# Patient Record
Sex: MALE | Race: WHITE | NOT HISPANIC OR LATINO
[De-identification: names, ages, dates, MRNs, and addresses within clinical notes are randomized per-mention and may not be internally consistent; named-entity substitution may affect disease eponyms.]

---

## 2017-06-25 ENCOUNTER — RX RENEWAL (OUTPATIENT)
Age: 82
End: 2017-06-25

## 2017-07-09 ENCOUNTER — RX RENEWAL (OUTPATIENT)
Age: 82
End: 2017-07-09

## 2021-10-09 ENCOUNTER — INPATIENT (INPATIENT)
Facility: HOSPITAL | Age: 86
LOS: 2 days | Discharge: HOME | DRG: 813 | End: 2021-10-12
Attending: FAMILY MEDICINE | Admitting: FAMILY MEDICINE
Payer: MEDICARE

## 2021-10-09 VITALS — HEIGHT: 73.25 IN | WEIGHT: 199.96 LBS

## 2021-10-09 DIAGNOSIS — Z95.0 PRESENCE OF CARDIAC PACEMAKER: Chronic | ICD-10-CM

## 2021-10-09 DIAGNOSIS — K92.2 GASTROINTESTINAL HEMORRHAGE, UNSPECIFIED: ICD-10-CM

## 2021-10-09 DIAGNOSIS — Z98.61 CORONARY ANGIOPLASTY STATUS: Chronic | ICD-10-CM

## 2021-10-09 LAB
ALBUMIN SERPL ELPH-MCNC: 3.1 G/DL — LOW (ref 3.3–5)
ALP SERPL-CCNC: 92 U/L — SIGNIFICANT CHANGE UP (ref 40–120)
ALT FLD-CCNC: 67 U/L — SIGNIFICANT CHANGE UP (ref 12–78)
ANION GAP SERPL CALC-SCNC: 5 MMOL/L — SIGNIFICANT CHANGE UP (ref 5–17)
APTT BLD: 32 SEC — SIGNIFICANT CHANGE UP (ref 27.5–35.5)
APTT BLD: 32.7 SEC — SIGNIFICANT CHANGE UP (ref 27.5–35.5)
AST SERPL-CCNC: 42 U/L — HIGH (ref 15–37)
BASOPHILS # BLD AUTO: 0 K/UL — SIGNIFICANT CHANGE UP (ref 0–0.2)
BASOPHILS NFR BLD AUTO: 0 % — SIGNIFICANT CHANGE UP (ref 0–2)
BILIRUB SERPL-MCNC: 0.6 MG/DL — SIGNIFICANT CHANGE UP (ref 0.2–1.2)
BUN SERPL-MCNC: 20 MG/DL — SIGNIFICANT CHANGE UP (ref 7–23)
CALCIUM SERPL-MCNC: 8.1 MG/DL — LOW (ref 8.5–10.1)
CHLORIDE SERPL-SCNC: 104 MMOL/L — SIGNIFICANT CHANGE UP (ref 96–108)
CO2 SERPL-SCNC: 28 MMOL/L — SIGNIFICANT CHANGE UP (ref 22–31)
CREAT SERPL-MCNC: 0.85 MG/DL — SIGNIFICANT CHANGE UP (ref 0.5–1.3)
EOSINOPHIL # BLD AUTO: 0.07 K/UL — SIGNIFICANT CHANGE UP (ref 0–0.5)
EOSINOPHIL NFR BLD AUTO: 6 % — SIGNIFICANT CHANGE UP (ref 0–6)
GLUCOSE SERPL-MCNC: 101 MG/DL — HIGH (ref 70–99)
HCT VFR BLD CALC: 33.4 % — LOW (ref 39–50)
HCT VFR BLD CALC: 38.1 % — LOW (ref 39–50)
HGB BLD-MCNC: 10.3 G/DL — LOW (ref 13–17)
HGB BLD-MCNC: 9.4 G/DL — LOW (ref 13–17)
INR BLD: 1.04 RATIO — SIGNIFICANT CHANGE UP (ref 0.88–1.16)
INR BLD: 1.17 RATIO — HIGH (ref 0.88–1.16)
LYMPHOCYTES # BLD AUTO: 0.39 K/UL — LOW (ref 1–3.3)
LYMPHOCYTES # BLD AUTO: 36 % — SIGNIFICANT CHANGE UP (ref 13–44)
MAGNESIUM SERPL-MCNC: 2.1 MG/DL — SIGNIFICANT CHANGE UP (ref 1.6–2.6)
MCHC RBC-ENTMCNC: 17.2 PG — LOW (ref 27–34)
MCHC RBC-ENTMCNC: 17.5 PG — LOW (ref 27–34)
MCHC RBC-ENTMCNC: 27 GM/DL — LOW (ref 32–36)
MCHC RBC-ENTMCNC: 28.1 GM/DL — LOW (ref 32–36)
MCV RBC AUTO: 62.1 FL — LOW (ref 80–100)
MCV RBC AUTO: 63.7 FL — LOW (ref 80–100)
MONOCYTES # BLD AUTO: 0.09 K/UL — SIGNIFICANT CHANGE UP (ref 0–0.9)
MONOCYTES NFR BLD AUTO: 8 % — SIGNIFICANT CHANGE UP (ref 2–14)
NEUTROPHILS # BLD AUTO: 0.55 K/UL — LOW (ref 1.8–7.4)
NEUTROPHILS NFR BLD AUTO: 50 % — SIGNIFICANT CHANGE UP (ref 43–77)
NRBC # BLD: SIGNIFICANT CHANGE UP /100 WBCS (ref 0–0)
PLATELET # BLD AUTO: 39 K/UL — LOW (ref 150–400)
PLATELET # BLD AUTO: 49 K/UL — LOW (ref 150–400)
POTASSIUM SERPL-MCNC: 4.3 MMOL/L — SIGNIFICANT CHANGE UP (ref 3.5–5.3)
POTASSIUM SERPL-SCNC: 4.3 MMOL/L — SIGNIFICANT CHANGE UP (ref 3.5–5.3)
PROT SERPL-MCNC: 6.5 GM/DL — SIGNIFICANT CHANGE UP (ref 6–8.3)
PROTHROM AB SERPL-ACNC: 12 SEC — SIGNIFICANT CHANGE UP (ref 10.6–13.6)
PROTHROM AB SERPL-ACNC: 13.5 SEC — SIGNIFICANT CHANGE UP (ref 10.6–13.6)
RBC # BLD: 5.38 M/UL — SIGNIFICANT CHANGE UP (ref 4.2–5.8)
RBC # BLD: 5.98 M/UL — HIGH (ref 4.2–5.8)
RBC # FLD: 24.7 % — HIGH (ref 10.3–14.5)
RBC # FLD: 25.2 % — HIGH (ref 10.3–14.5)
SARS-COV-2 RNA SPEC QL NAA+PROBE: SIGNIFICANT CHANGE UP
SODIUM SERPL-SCNC: 137 MMOL/L — SIGNIFICANT CHANGE UP (ref 135–145)
TROPONIN I, HIGH SENSITIVITY RESULT: 16.8 NG/L — SIGNIFICANT CHANGE UP
WBC # BLD: 1.02 K/UL — CRITICAL LOW (ref 3.8–10.5)
WBC # BLD: 1.09 K/UL — CRITICAL LOW (ref 3.8–10.5)
WBC # FLD AUTO: 1.02 K/UL — CRITICAL LOW (ref 3.8–10.5)
WBC # FLD AUTO: 1.09 K/UL — CRITICAL LOW (ref 3.8–10.5)

## 2021-10-09 PROCEDURE — C9113: CPT

## 2021-10-09 PROCEDURE — 86769 SARS-COV-2 COVID-19 ANTIBODY: CPT

## 2021-10-09 PROCEDURE — 36415 COLL VENOUS BLD VENIPUNCTURE: CPT

## 2021-10-09 PROCEDURE — 99285 EMERGENCY DEPT VISIT HI MDM: CPT

## 2021-10-09 PROCEDURE — 85610 PROTHROMBIN TIME: CPT

## 2021-10-09 PROCEDURE — 85027 COMPLETE CBC AUTOMATED: CPT

## 2021-10-09 PROCEDURE — 36430 TRANSFUSION BLD/BLD COMPNT: CPT

## 2021-10-09 PROCEDURE — 99222 1ST HOSP IP/OBS MODERATE 55: CPT

## 2021-10-09 PROCEDURE — G1004: CPT

## 2021-10-09 PROCEDURE — 82272 OCCULT BLD FECES 1-3 TESTS: CPT

## 2021-10-09 PROCEDURE — P9100: CPT

## 2021-10-09 PROCEDURE — 85025 COMPLETE CBC W/AUTO DIFF WBC: CPT

## 2021-10-09 PROCEDURE — 70450 CT HEAD/BRAIN W/O DYE: CPT | Mod: 26,MG

## 2021-10-09 PROCEDURE — P9037: CPT

## 2021-10-09 PROCEDURE — 97116 GAIT TRAINING THERAPY: CPT | Mod: GP

## 2021-10-09 PROCEDURE — 93010 ELECTROCARDIOGRAM REPORT: CPT

## 2021-10-09 PROCEDURE — 83735 ASSAY OF MAGNESIUM: CPT

## 2021-10-09 PROCEDURE — 85730 THROMBOPLASTIN TIME PARTIAL: CPT

## 2021-10-09 PROCEDURE — 97161 PT EVAL LOW COMPLEX 20 MIN: CPT | Mod: GP

## 2021-10-09 PROCEDURE — 80048 BASIC METABOLIC PNL TOTAL CA: CPT

## 2021-10-09 PROCEDURE — 71045 X-RAY EXAM CHEST 1 VIEW: CPT | Mod: 26

## 2021-10-09 RX ORDER — POTASSIUM CHLORIDE 20 MEQ
20 PACKET (EA) ORAL DAILY
Refills: 0 | Status: DISCONTINUED | OUTPATIENT
Start: 2021-10-09 | End: 2021-10-12

## 2021-10-09 RX ORDER — LISINOPRIL 2.5 MG/1
5 TABLET ORAL DAILY
Refills: 0 | Status: DISCONTINUED | OUTPATIENT
Start: 2021-10-09 | End: 2021-10-10

## 2021-10-09 RX ORDER — LANOLIN ALCOHOL/MO/W.PET/CERES
3 CREAM (GRAM) TOPICAL AT BEDTIME
Refills: 0 | Status: DISCONTINUED | OUTPATIENT
Start: 2021-10-09 | End: 2021-10-12

## 2021-10-09 RX ORDER — PROTHROMBIN COMPLEX CONCENTRATE (HUMAN) 25.5; 16.5; 24; 22; 22; 26 [IU]/ML; [IU]/ML; [IU]/ML; [IU]/ML; [IU]/ML; [IU]/ML
3500 POWDER, FOR SOLUTION INTRAVENOUS ONCE
Refills: 0 | Status: DISCONTINUED | OUTPATIENT
Start: 2021-10-09 | End: 2021-10-09

## 2021-10-09 RX ORDER — AMIODARONE HYDROCHLORIDE 400 MG/1
1 TABLET ORAL
Qty: 0 | Refills: 0 | DISCHARGE

## 2021-10-09 RX ORDER — FUROSEMIDE 40 MG
1 TABLET ORAL
Qty: 0 | Refills: 0 | DISCHARGE

## 2021-10-09 RX ORDER — PROTHROMBIN COMPLEX CONCENTRATE (HUMAN) 25.5; 16.5; 24; 22; 22; 26 [IU]/ML; [IU]/ML; [IU]/ML; [IU]/ML; [IU]/ML; [IU]/ML
2500 POWDER, FOR SOLUTION INTRAVENOUS ONCE
Refills: 0 | Status: COMPLETED | OUTPATIENT
Start: 2021-10-09 | End: 2021-10-09

## 2021-10-09 RX ORDER — ONDANSETRON 8 MG/1
4 TABLET, FILM COATED ORAL EVERY 8 HOURS
Refills: 0 | Status: DISCONTINUED | OUTPATIENT
Start: 2021-10-09 | End: 2021-10-12

## 2021-10-09 RX ORDER — ACETAMINOPHEN 500 MG
650 TABLET ORAL EVERY 6 HOURS
Refills: 0 | Status: DISCONTINUED | OUTPATIENT
Start: 2021-10-09 | End: 2021-10-12

## 2021-10-09 RX ORDER — POTASSIUM CHLORIDE 20 MEQ
1 PACKET (EA) ORAL
Qty: 0 | Refills: 0 | DISCHARGE

## 2021-10-09 RX ORDER — APIXABAN 2.5 MG/1
0 TABLET, FILM COATED ORAL
Qty: 0 | Refills: 0 | DISCHARGE

## 2021-10-09 RX ORDER — ATORVASTATIN CALCIUM 80 MG/1
40 TABLET, FILM COATED ORAL AT BEDTIME
Refills: 0 | Status: DISCONTINUED | OUTPATIENT
Start: 2021-10-09 | End: 2021-10-12

## 2021-10-09 RX ORDER — AMIODARONE HYDROCHLORIDE 400 MG/1
200 TABLET ORAL
Refills: 0 | Status: DISCONTINUED | OUTPATIENT
Start: 2021-10-09 | End: 2021-10-12

## 2021-10-09 RX ORDER — PROTHROMBIN COMPLEX CONCENTRATE (HUMAN) 25.5; 16.5; 24; 22; 22; 26 [IU]/ML; [IU]/ML; [IU]/ML; [IU]/ML; [IU]/ML; [IU]/ML
500 POWDER, FOR SOLUTION INTRAVENOUS ONCE
Refills: 0 | Status: DISCONTINUED | OUTPATIENT
Start: 2021-10-09 | End: 2021-10-09

## 2021-10-09 RX ORDER — METOPROLOL TARTRATE 50 MG
50 TABLET ORAL DAILY
Refills: 0 | Status: DISCONTINUED | OUTPATIENT
Start: 2021-10-09 | End: 2021-10-10

## 2021-10-09 RX ORDER — FUROSEMIDE 40 MG
40 TABLET ORAL DAILY
Refills: 0 | Status: DISCONTINUED | OUTPATIENT
Start: 2021-10-09 | End: 2021-10-10

## 2021-10-09 RX ADMIN — ATORVASTATIN CALCIUM 40 MILLIGRAM(S): 80 TABLET, FILM COATED ORAL at 21:02

## 2021-10-09 RX ADMIN — PROTHROMBIN COMPLEX CONCENTRATE (HUMAN) 400 INTERNATIONAL UNIT(S): 25.5; 16.5; 24; 22; 22; 26 POWDER, FOR SOLUTION INTRAVENOUS at 14:02

## 2021-10-09 NOTE — ED STATDOCS - NSICDXPASTMEDICALHX_GEN_ALL_CORE_FT
PAST MEDICAL HISTORY:  Atrial fibrillation     CAD (coronary artery disease) stent in 2007    Hypertension     Pacemaker     Polycythemia vera     Prostate ca radiation treatment 5yrs ago    Rheumatic fever child traylor    Symptomatic bradycardia

## 2021-10-09 NOTE — PATIENT PROFILE ADULT - DATE OF LAST VACCINATION
Patient called stating that the medication prescribed is not giving any relief to his pain. Patient took the medication as was instructed.    oxyCODONE-acetaminophen (PERCOCET)  MG per tablet   18-Aug-2021

## 2021-10-09 NOTE — ED PROVIDER NOTE - OBJECTIVE STATEMENT
85 y/o male with a PMHx of polycythemia vera, symptomatic bradycardia, prostate CA, rheumatic fever, CAD s/p stent,  pacemaker, HTN, Afib presents to the ED c/o ecchymosis. Sent by MD. States he exercises and was in good shape but after first lung CA chemo 09/27, he felt increasingly weak on exertion. State she feels fine in ED. +dark stool x2 days. On Eliquis. No HA.

## 2021-10-09 NOTE — ED ADULT NURSE NOTE - NSIMPLEMENTINTERV_GEN_ALL_ED
Implemented All Fall Risk Interventions:  [place] to call system. Call bell, personal items and telephone within reach. Instruct patient to call for assistance. Room bathroom lighting operational. Non-slip footwear when patient is off stretcher. Physically safe environment: no spills, clutter or unnecessary equipment. Stretcher in lowest position, wheels locked, appropriate side rails in place. Provide visual cue, wrist band, yellow gown, etc. Monitor gait and stability. Monitor for mental status changes and reorient to person, place, and time. Review medications for side effects contributing to fall risk. Reinforce activity limits and safety measures with patient and family.

## 2021-10-09 NOTE — ED ADULT NURSE NOTE - OBJECTIVE STATEMENT
Patient sent to ED for evaluation with increased bruising and dark stool.   Patient also c/o weakness.  On chemotherapy.

## 2021-10-09 NOTE — ED STATDOCS - PROGRESS NOTE DETAILS
Liz BATEMAN for Dr. Devine: Started chemo 2 weeks ago, pt is pale with SALCIDO, multiple episodes of spontaneous bruising and black stools. Will send pt to main ED for further evaluation.

## 2021-10-09 NOTE — ED ADULT TRIAGE NOTE - CHIEF COMPLAINT QUOTE
PT SENT IN BY MD FOR BRUISING IN HAND, DARK TARRY STOOL, WEAKNESS, PT ON CHEMO, LAST CHEMO 2 WEEKS AGO, PT ALSO TAKING IRON.  HX OF LUNG CA.

## 2021-10-09 NOTE — H&P ADULT - NSICDXPASTSURGICALHX_GEN_ALL_CORE_FT
PAST SURGICAL HISTORY:  Artificial cardiac pacemaker 2006    History of PTCA with stent to RCA 2007

## 2021-10-09 NOTE — ED PROVIDER NOTE - CLINICAL SUMMARY MEDICAL DECISION MAKING FREE TEXT BOX
87 y/o male with a PMHx of polycythemia vera, symptomatic bradycardia, prostate CA, rheumatic fever, CAD s/p stent,  pacemaker, HTN, Afib presents to the ED c/o ecchymosis. Recent chemo with +dark stools. Concern for thrombocytopenia and anemia. Check labs, Guaiac and reassess.

## 2021-10-09 NOTE — H&P ADULT - ASSESSMENT
dark stools on eliquis with associated with generalized fatigue and weakness.  guiac deferred due to neutropenia, consistent with symptomatic anemia due to GIB on eliquis.  s/p kcentra and 1U plt in ED  -admit to med/surg   -onco consult   -gi consult   -monitor cbc   -hold aspirin and eliquis for now   -neutropenic precautions     pancytopenia, likely related to chemo (unsure of med received  -hem/onco consult     cad, s/p cabg   -cont bb, lasix with parameters     afib s/p PPM on eliquis   -cont metoprolol and amiodarone     dvt prophylaxis   -contraindicated due to likely GIB

## 2021-10-09 NOTE — ED PROVIDER NOTE - NS ED ROS FT
Gen: No fever, normal appetite.   Eyes: No eye irritation or discharge  ENT: No ear pain, congestion, sore throat  Resp: No cough or trouble breathing  Cardiovascular: No chest pain or palpitation  Gastroenteric: No nausea/vomiting, diarrhea, constipation. +dark stool.  :  No change in urine output; no dysuria  MS: No joint or muscle pain  Skin: No rashes. +ecchymosis on hand.  Neuro: No headache; no abnormal movements. +weakness.   Remainder negative, except as per the HPI

## 2021-10-09 NOTE — H&P ADULT - NSHPPHYSICALEXAM_GEN_ALL_CORE
PHYSICAL EXAM:    General: NAD, Alert & Oriented X3.  HEENT: NC/AT, Normal Hearing, dry mucous membranes  Eyes: EOMI, PERRLA, Conjunctiva and sclera clear  Neck: Soft and supple. No JVD  Respiratory: Clear breath sounds bilaterally.  No wheezing, rales or rhonchi  Cardiovascular: S1 and S2, irregular rhythm, normal rate .  No murmurs, gallops or rubs  Gastrointestinal: Soft, non-tender, non-distended. No guarding, rebound tenderness, +BS  Genitourinary: Voiding freely. No palpable bladder  Extremities: +2 peripheral pulses bilaterally.  No clubbing, cyanosis, or edema.    Neurological: CN II-XII intact.  No focal deficits  Musculoskeletal: 5/5 strength bilateral upper and lower extremities  Skin: No rashes, multiple ecchymosis

## 2021-10-09 NOTE — H&P ADULT - HISTORY OF PRESENT ILLNESS
87 y/o male with a PMHx of polycythemia vera, prostate CA, rheumatic fever, CAD s/p CABG,  Afib s/p PPM on eliquis, HTN, Afib presents to the ED c/o ecchymosis and dark stools for the last several days following first dose of chemo on 9/27 for lung cancer.  has been followed by Dr. Vazquez for several years but just received started chemo. sunce has fet generalized weakness fatigue and noticed dark stools, but gross blood.  prior to treatment is in relatively good shape, still excersies and practicing as a malpractice .  denies any other acute issues      In ED, noted to have mild anemia but neutropenic at wbc-1.09.  guiac held by to neurtropenic.  no BM since PTA.  afebrile.

## 2021-10-09 NOTE — ED STATDOCS - NSICDXFAMILYHX_GEN_ALL_CORE_FT
FAMILY HISTORY:  Mother  Still living? No  Family history of bronchiectasis, Age at diagnosis: Age Unknown

## 2021-10-09 NOTE — ED PROVIDER NOTE - PHYSICAL EXAMINATION
GEN - NAD; well appearing; A+O x3   HEAD - NC/AT     EYES - EOMI, no conjunctival pallor, no scleral icterus  ENT -   mucous membranes  moist , no discharge      NECK - Neck supple  PULM - CTA b/l,  symmetric breath sounds  COR -  RRR, S1 S2, no murmurs  ABD - , ND, NT, soft, no guarding, no rebound, no masses    BACK - no CVA tenderness, nontender spine     EXTREMS - no edema, no deformity, warm and well perfused    SKIN - no rash. +diffuse ecchymosis on hands   NEUROLOGIC - alert, sensation nl, motor 5/5 RUE/LUE/RLE/LLE

## 2021-10-10 LAB
ANION GAP SERPL CALC-SCNC: 4 MMOL/L — LOW (ref 5–17)
BUN SERPL-MCNC: 14 MG/DL — SIGNIFICANT CHANGE UP (ref 7–23)
CALCIUM SERPL-MCNC: 8.4 MG/DL — LOW (ref 8.5–10.1)
CHLORIDE SERPL-SCNC: 105 MMOL/L — SIGNIFICANT CHANGE UP (ref 96–108)
CO2 SERPL-SCNC: 29 MMOL/L — SIGNIFICANT CHANGE UP (ref 22–31)
COVID-19 SPIKE DOMAIN AB INTERP: POSITIVE
COVID-19 SPIKE DOMAIN ANTIBODY RESULT: >250 U/ML — HIGH
CREAT SERPL-MCNC: 0.84 MG/DL — SIGNIFICANT CHANGE UP (ref 0.5–1.3)
GLUCOSE SERPL-MCNC: 94 MG/DL — SIGNIFICANT CHANGE UP (ref 70–99)
HCT VFR BLD CALC: 35 % — LOW (ref 39–50)
HGB BLD-MCNC: 9.6 G/DL — LOW (ref 13–17)
MCHC RBC-ENTMCNC: 17.2 PG — LOW (ref 27–34)
MCHC RBC-ENTMCNC: 27.4 GM/DL — LOW (ref 32–36)
MCV RBC AUTO: 62.7 FL — LOW (ref 80–100)
NRBC # BLD: SIGNIFICANT CHANGE UP /100 WBCS (ref 0–0)
OB PNL STL: NEGATIVE — SIGNIFICANT CHANGE UP
PLATELET # BLD AUTO: 44 K/UL — LOW (ref 150–400)
POTASSIUM SERPL-MCNC: 4 MMOL/L — SIGNIFICANT CHANGE UP (ref 3.5–5.3)
POTASSIUM SERPL-SCNC: 4 MMOL/L — SIGNIFICANT CHANGE UP (ref 3.5–5.3)
RBC # BLD: 5.58 M/UL — SIGNIFICANT CHANGE UP (ref 4.2–5.8)
RBC # FLD: 24.8 % — HIGH (ref 10.3–14.5)
SARS-COV-2 IGG+IGM SERPL QL IA: >250 U/ML — HIGH
SARS-COV-2 IGG+IGM SERPL QL IA: POSITIVE
SODIUM SERPL-SCNC: 138 MMOL/L — SIGNIFICANT CHANGE UP (ref 135–145)
WBC # BLD: 0.8 K/UL — CRITICAL LOW (ref 3.8–10.5)
WBC # FLD AUTO: 0.8 K/UL — CRITICAL LOW (ref 3.8–10.5)

## 2021-10-10 PROCEDURE — 99233 SBSQ HOSP IP/OBS HIGH 50: CPT

## 2021-10-10 RX ORDER — PANTOPRAZOLE SODIUM 20 MG/1
40 TABLET, DELAYED RELEASE ORAL EVERY 12 HOURS
Refills: 0 | Status: DISCONTINUED | OUTPATIENT
Start: 2021-10-10 | End: 2021-10-12

## 2021-10-10 RX ORDER — SODIUM CHLORIDE 9 MG/ML
1000 INJECTION INTRAMUSCULAR; INTRAVENOUS; SUBCUTANEOUS
Refills: 0 | Status: DISCONTINUED | OUTPATIENT
Start: 2021-10-10 | End: 2021-10-12

## 2021-10-10 RX ORDER — METOPROLOL TARTRATE 50 MG
25 TABLET ORAL DAILY
Refills: 0 | Status: DISCONTINUED | OUTPATIENT
Start: 2021-10-10 | End: 2021-10-12

## 2021-10-10 RX ORDER — FUROSEMIDE 40 MG
20 TABLET ORAL DAILY
Refills: 0 | Status: DISCONTINUED | OUTPATIENT
Start: 2021-10-10 | End: 2021-10-12

## 2021-10-10 RX ORDER — FILGRASTIM 480MCG/1.6
480 VIAL (ML) INJECTION DAILY
Refills: 0 | Status: DISCONTINUED | OUTPATIENT
Start: 2021-10-10 | End: 2021-10-12

## 2021-10-10 RX ADMIN — ATORVASTATIN CALCIUM 40 MILLIGRAM(S): 80 TABLET, FILM COATED ORAL at 20:51

## 2021-10-10 RX ADMIN — SODIUM CHLORIDE 50 MILLILITER(S): 9 INJECTION INTRAMUSCULAR; INTRAVENOUS; SUBCUTANEOUS at 15:16

## 2021-10-10 RX ADMIN — PANTOPRAZOLE SODIUM 40 MILLIGRAM(S): 20 TABLET, DELAYED RELEASE ORAL at 15:16

## 2021-10-10 RX ADMIN — Medication 480 MICROGRAM(S): at 16:52

## 2021-10-10 RX ADMIN — Medication 20 MILLIEQUIVALENT(S): at 09:33

## 2021-10-10 RX ADMIN — AMIODARONE HYDROCHLORIDE 200 MILLIGRAM(S): 400 TABLET ORAL at 09:32

## 2021-10-10 RX ADMIN — PANTOPRAZOLE SODIUM 40 MILLIGRAM(S): 20 TABLET, DELAYED RELEASE ORAL at 20:51

## 2021-10-10 RX ADMIN — Medication 40 MILLIGRAM(S): at 09:33

## 2021-10-10 RX ADMIN — Medication 50 MILLIGRAM(S): at 09:33

## 2021-10-10 RX ADMIN — LISINOPRIL 5 MILLIGRAM(S): 2.5 TABLET ORAL at 09:33

## 2021-10-10 NOTE — PROGRESS NOTE ADULT - ASSESSMENT
dark stools on eliquis with associated with generalized fatigue and weakness.    guiac deferred due to neutropenia, consistent with symptomatic anemia due to GIB on eliquis.    s/p kcentra and 1U plt in ED  -admit to med/surg   -onco consult  and gi consult   -monitor cbc   -hold aspirin and eliquis for now   -neutropenic precautions     pancytopenia, likely related to chemo (unsure of med received  -hem/onco consult     cad, s/p cabg   -cont bb, lasix with parameters     afib s/p PPM on eliquis   -cont metoprolol and amiodarone     dvt prophylaxis   -contraindicated due to likely GIB    dark stools on eliquis with associated with generalized fatigue and weakness.    guiac deferred due to neutropenia, consistent with symptomatic anemia due to GIB on eliquis.    s/p kcentra and 1U plt in ED  -admit to med/surg   -onco consult  and gi consult   -neutropenic precautions   10/10 - monitor H&H, cont to hold asa and eliquis     pancytopenia, likely related to chemo (unsure of med received  -hem/onco consult   10/10 - appreciate Heme Onc help - start filgrastim     cad, s/p cabg   -cont bb, lasix with parameters   10/10 - due to low BPs - I made the following changes to his home meds:  Halved metopolol to 25, lasix to 20; stopped the ACEI     afib s/p PPM on eliquis   -cont metoprolol and amiodarone   10/10 - see above changes     dvt prophylaxis   -contraindicated due to likely GIB     discussed with GI AND DAVE and wife at bedside   pt sees Dr AGOSTO at [facility]

## 2021-10-10 NOTE — PROGRESS NOTE ADULT - SUBJECTIVE AND OBJECTIVE BOX
Patient is a 86y old  Male who presents with a chief complaint of     HPI:  85 y/o male with a PMHx of polycythemia vera, prostate CA, rheumatic fever, CAD s/p CABG,  Afib s/p PPM on eliquis, HTN, Stage IV lung Ca s/p chemo on 9/27 at [facility] presents to the ED c/o ecchymosis and dark stools for the last several days following first dose of chemo on 9/27 for lung cancer.  has been followed by Dr. Vazquez for several years but just received started chemo. he has fet generalized weakness fatigue and noticed dark stools, but no gross blood.  prior to treatment is in relatively good shape, still excersies and practicing as a malpractice .  denies any other acute issues.  last colonoscopy 5 yrs ago          REVIEW OF SYSTEMS:    CONSTITUTIONAL: No weakness, fevers or chills  EYES/ENT: No visual changes;  No vertigo or throat pain   NECK: No pain or stiffness  RESPIRATORY: No cough, wheezing, hemoptysis; No shortness of breath  CARDIOVASCULAR: No chest pain or palpitations  GASTROINTESTINAL: No abdominal or epigastric pain. No nausea, vomiting, or hematemesis; No diarrhea or constipation. No melena or hematochezia.  GENITOURINARY: No dysuria, frequency or hematuria  NEUROLOGICAL: No numbness or weakness  SKIN: No itching, burning, rashes, or lesions   All other review of systems is negative unless indicated above.    PAST MEDICAL & SURGICAL HISTORY:  Atrial fibrillation    Hypertension    CAD (coronary artery disease)  stent in 2007    Pacemaker    Rheumatic fever  child traylor    Prostate ca  radiation treatment 5yrs ago    Symptomatic bradycardia    Polycythemia vera    Artificial cardiac pacemaker  2006    History of PTCA  with stent to RCA 2007        SOCIAL HISTORY:    FAMILY HISTORY:  Family history of bronchiectasis (Mother)        MEDICATIONS  (STANDING):  aMIOdarone    Tablet 200 milliGRAM(s) Oral <User Schedule>  atorvastatin 40 milliGRAM(s) Oral at bedtime  furosemide    Tablet 20 milliGRAM(s) Oral daily  metoprolol succinate ER 25 milliGRAM(s) Oral daily  pantoprazole  Injectable 40 milliGRAM(s) IV Push every 12 hours  potassium chloride   Powder 20 milliEquivalent(s) Oral daily  sodium chloride 0.9%. 1000 milliLiter(s) (50 mL/Hr) IV Continuous <Continuous>    MEDICATIONS  (PRN):  acetaminophen   Tablet .. 650 milliGRAM(s) Oral every 6 hours PRN Temp greater or equal to 38C (100.4F), Mild Pain (1 - 3)  aluminum hydroxide/magnesium hydroxide/simethicone Suspension 30 milliLiter(s) Oral every 4 hours PRN Dyspepsia  melatonin 3 milliGRAM(s) Oral at bedtime PRN Insomnia  ondansetron Injectable 4 milliGRAM(s) IV Push every 8 hours PRN Nausea and/or Vomiting      Allergies    No Known Allergies    Intolerances        Vital Signs Last 24 Hrs  T(C): 36.6 (10 Oct 2021 15:03), Max: 36.8 (09 Oct 2021 15:59)  T(F): 97.9 (10 Oct 2021 15:03), Max: 98.3 (09 Oct 2021 15:59)  HR: 71 (10 Oct 2021 15:03) (70 - 78)  BP: 84/52 (10 Oct 2021 15:03) (83/54 - 135/74)  BP(mean): 66 (09 Oct 2021 15:59) (66 - 66)  RR: 18 (10 Oct 2021 15:03) (16 - 18)  SpO2: 100% (10 Oct 2021 15:03) (98% - 100%)    PHYSICAL EXAM  General: adult in NAD  HEENT: clear oropharynx, anicteric sclera, pink conjunctiva  Neck: supple  CV: normal S1/S2 with no murmur rubs or gallops  Lungs: positive air movement b/l ant lungs,clear to auscultation, no wheezes, no rales  Abdomen: soft non-tender non-distended, no hepatosplenomegaly  Ext: no clubbing cyanosis or edema  Skin: no rashes and no petechiae  Neuro: alert and oriented X 4, no focal deficits      LABS:                          9.6    0.80  )-----------( 44       ( 10 Oct 2021 07:00 )             35.0         Mean Cell Volume : 62.7 fl  Mean Cell Hemoglobin : 17.2 pg  Mean Cell Hemoglobin Concentration : 27.4 gm/dL  Auto Neutrophil # : 0.22 K/uL  Auto Lymphocyte # : 0.35 K/uL  Auto Monocyte # : 0.06 K/uL  Auto Eosinophil # : 0.07 K/uL  Auto Basophil # : 0.03 K/uL  Auto Neutrophil % : 29.6 %  Auto Lymphocyte % : 47.3 %  Auto Monocyte % : 8.1 %  Auto Eosinophil % : 9.5 %  Auto Basophil % : 4.1 %      Serial CBC's  10-10 @ 07:00  Hct-35.0 / Hgb-9.6 / Plat-44 / RBC-5.58 / WBC-0.80  Serial CBC's  10-09 @ 22:25  Hct-33.4 / Hgb-9.4 / Plat-49 / RBC-5.38 / WBC-1.02  Serial CBC's  10-09 @ 11:17  Hct-38.1 / Hgb-10.3 / Plat-39 / RBC-5.98 / WBC-1.09      10-10    138  |  105  |  14  ----------------------------<  94  4.0   |  29  |  0.84    Ca    8.4<L>      10 Oct 2021 07:00  Mg     2.1     10-09    TPro  6.5  /  Alb  3.1<L>  /  TBili  0.6  /  DBili  x   /  AST  42<H>  /  ALT  67  /  AlkPhos  92  10-09      PT/INR - ( 09 Oct 2021 14:48 )   PT: 12.0 sec;   INR: 1.04 ratio         PTT - ( 09 Oct 2021 14:48 )  PTT:32.0 sec                BLOOD SMEAR INTERPRETATION:       RADIOLOGY & ADDITIONAL STUDIES:

## 2021-10-10 NOTE — PROVIDER CONTACT NOTE (OTHER) - SITUATION
spoke with Pretty in answering service regarding consult spoke with answering service regarding consult spoke with Marques in answering service regarding consult

## 2021-10-10 NOTE — PROVIDER CONTACT NOTE (CRITICAL VALUE NOTIFICATION) - ACTION/TREATMENT ORDERED:
MD [place]
MD made aware. No new orders at this time. Pt remained on neutropenic precautions, VS stable Q4h, afebrile. Will continue to monitor pt.
See MD orders

## 2021-10-10 NOTE — PROGRESS NOTE ADULT - SUBJECTIVE AND OBJECTIVE BOX
CHIEF COMPLAINT:85 y/o male with a PMHx of polycythemia vera, prostate CA, rheumatic fever, CAD s/p CABG,  Afib s/p PPM on eliquis, HTN, Afib presents to the ED c/o ecchymosis and dark stools for the last several days following first dose of chemo on 9/27 for lung cancer.  has been followed by Dr. Vazquez for several years but just received started chemo. sunce has fet generalized weakness fatigue and noticed dark stools, but gross blood.  prior to treatment is in relatively good shape, still excersies and practicing as a malpractice .  denies any other acute issues      In ED, noted to have mild anemia but neutropenic at wbc-1.09.  guiac held by to neurtropenic.  no BM since PTA.  afebrile.      SUBJECTIVE:           Vital Signs Last 24 Hrs  T(F): 97.7 (10 Oct 2021 09:06), Max: 98.3 (09 Oct 2021 15:59)  HR: 73 (10 Oct 2021 09:06) (70 - 79)  BP: 125/69 (10 Oct 2021 09:06) (99/51 - 141/61)  RR: 16 (10 Oct 2021 09:06) (16 - 22)  SpO2: 100% (10 Oct 2021 09:06) (97% - 100%)  Constitutional: NAD, awake and alert  HEENT: PERR, EOMI  Neck: Soft and supple,  No JVD  Respiratory: Breath sounds are clear bilaterally, No wheezing, rales or rhonchi  Cardiovascular: S1 and S2, regular rate and rhythm, no Murmurs  Gastrointestinal: Bowel Sounds present, soft, nontender, nondistended  Extremities: No peripheral edema  Vascular: 2+ peripheral pulses  Neurological: A/O x 3, no focal deficits  Musculoskeletal: 5/5 strength b/l upper and lower extremities  Skin: No rashes    MEDICATIONS:  MEDICATIONS  (STANDING):  aMIOdarone    Tablet 200 milliGRAM(s) Oral <User Schedule>  atorvastatin 40 milliGRAM(s) Oral at bedtime  furosemide    Tablet 40 milliGRAM(s) Oral daily  lisinopril 5 milliGRAM(s) Oral daily  metoprolol tartrate Oral Tab/Cap - Peds 50 milliGRAM(s) Oral daily  potassium chloride   Powder 20 milliEquivalent(s) Oral daily      LABS: All Labs Reviewed:                      9.6    0.80  )-----------( 44       ( 10 Oct 2021 07:00 )             35.0  138  |  105  |  14  ----------------------------<  94  4.0   |  29  |  0.84  Ca    8.4<L>      10 Oct 2021 07:00  Mg     2.1     10-09  TPro  6.5  /  Alb  3.1<L>  /  TBili  0.6  /  DBili  x   /  AST  42<H>  /  ALT  67  /  AlkPhos  92  10-09  PT/INR - ( 09 Oct 2021 14:48 )   PT: 12.0 sec;   INR: 1.04 ratio    PTT - ( 09 Oct 2021 14:48 )  PTT:32.0 sec    RADIOLOGY/EKG:   CHIEF COMPLAINT:87 y/o male with a PMHx of polycythemia vera, prostate CA, rheumatic fever, CAD s/p CABG,  Afib s/p PPM on eliquis, HTN, Afib presents to the ED c/o ecchymosis and dark stools for the last several days following first dose of chemo on 9/27 for lung cancer.  has been followed by Dr. Vazquez for several years but just received started chemo. sunce has fet generalized weakness fatigue and noticed dark stools, but gross blood.  prior to treatment is in relatively good shape, still excersies and practicing as a malpractice .  denies any other acute issues      In ED, noted to have mild anemia but neutropenic at wbc-1.09.  guiac held by to neurtropenic.  no BM since PTA.  afebrile.      10/10 - no cp palps sob lightheadedness despite low BP; sitting up in chair, pleasant, conversant     Vital Signs Last 24 Hrs  T(F): 97.7 (10 Oct 2021 09:06), Max: 98.3 (09 Oct 2021 15:59)  HR: 73 (10 Oct 2021 09:06) (70 - 79)  BP: 125/69 (10 Oct 2021 09:06) (99/51 - 141/61)  RR: 16 (10 Oct 2021 09:06) (16 - 22)  SpO2: 100% (10 Oct 2021 09:06) (97% - 100%)  Constitutional: NAD, awake and alert  HEENT: PERR, EOMI  Neck: Soft and supple,  No JVD  Respiratory: Breath sounds are clear bilaterally, No wheezing, rales or rhonchi  Cardiovascular: S1 and S2, regular rate and rhythm, no Murmurs  Gastrointestinal: Bowel Sounds present, soft, nontender, nondistended  Extremities: No peripheral edema  Vascular: 2+ peripheral pulses  Neurological: A/O x 3, no focal deficits  Musculoskeletal: 5/5 strength b/l upper and lower extremities  Skin: No rashes    MEDICATIONS:  MEDICATIONS  (STANDING):  aMIOdarone    Tablet 200 milliGRAM(s) Oral <User Schedule>  atorvastatin 40 milliGRAM(s) Oral at bedtime  furosemide    Tablet 40 milliGRAM(s) Oral daily  lisinopril 5 milliGRAM(s) Oral daily  metoprolol tartrate Oral Tab/Cap - Peds 50 milliGRAM(s) Oral daily  potassium chloride   Powder 20 milliEquivalent(s) Oral daily      LABS: All Labs Reviewed:                      9.6    0.80  )-----------( 44       ( 10 Oct 2021 07:00 )             35.0  138  |  105  |  14  ----------------------------<  94  4.0   |  29  |  0.84  Ca    8.4<L>      10 Oct 2021 07:00  Mg     2.1     10-09  TPro  6.5  /  Alb  3.1<L>  /  TBili  0.6  /  DBili  x   /  AST  42<H>  /  ALT  67  /  AlkPhos  92  10-09  PT/INR - ( 09 Oct 2021 14:48 )   PT: 12.0 sec;   INR: 1.04 ratio    PTT - ( 09 Oct 2021 14:48 )  PTT:32.0 sec    RADIOLOGY/EKG:

## 2021-10-10 NOTE — PROGRESS NOTE ADULT - ASSESSMENT
87 y/o male with a PMHx of polycythemia vera, prostate CA, rheumatic fever, CAD s/p CABG,  Afib s/p PPM on eliquis, HTN, Stage IV lung Ca s/p chemo on 9/27 at [facility] presents to the ED c/o ecchymosis and dark stools for the last several days    Stage IV Lung Ca   - follows at [facility] ; last chemo 9/27 without Neulasta     GI bleed on Eliquis   - s/p kcentra and one unit plt in ER   - no further reports of gi bleed or dark stools  - GI eval pending    Pancytopenia sec to chemo   - will start filgrastrim   - transfuse for hb <7, plts <20    Connor Lawrence MD  [facility]  [phone]

## 2021-10-11 LAB
ANION GAP SERPL CALC-SCNC: 8 MMOL/L — SIGNIFICANT CHANGE UP (ref 5–17)
BASOPHILS # BLD AUTO: 0 K/UL — SIGNIFICANT CHANGE UP (ref 0–0.2)
BASOPHILS NFR BLD AUTO: 0 % — SIGNIFICANT CHANGE UP (ref 0–2)
BUN SERPL-MCNC: 11 MG/DL — SIGNIFICANT CHANGE UP (ref 7–23)
CALCIUM SERPL-MCNC: 8.2 MG/DL — LOW (ref 8.5–10.1)
CHLORIDE SERPL-SCNC: 105 MMOL/L — SIGNIFICANT CHANGE UP (ref 96–108)
CO2 SERPL-SCNC: 25 MMOL/L — SIGNIFICANT CHANGE UP (ref 22–31)
CREAT SERPL-MCNC: 0.91 MG/DL — SIGNIFICANT CHANGE UP (ref 0.5–1.3)
EOSINOPHIL # BLD AUTO: 0.17 K/UL — SIGNIFICANT CHANGE UP (ref 0–0.5)
EOSINOPHIL NFR BLD AUTO: 8 % — HIGH (ref 0–6)
GLUCOSE SERPL-MCNC: 108 MG/DL — HIGH (ref 70–99)
HCT VFR BLD CALC: 33.2 % — LOW (ref 39–50)
HGB BLD-MCNC: 9.2 G/DL — LOW (ref 13–17)
LYMPHOCYTES # BLD AUTO: 0.09 K/UL — LOW (ref 1–3.3)
LYMPHOCYTES # BLD AUTO: 4 % — LOW (ref 13–44)
MAGNESIUM SERPL-MCNC: 2 MG/DL — SIGNIFICANT CHANGE UP (ref 1.6–2.6)
MCHC RBC-ENTMCNC: 17.6 PG — LOW (ref 27–34)
MCHC RBC-ENTMCNC: 27.7 GM/DL — LOW (ref 32–36)
MCV RBC AUTO: 63.5 FL — LOW (ref 80–100)
MONOCYTES # BLD AUTO: 0.3 K/UL — SIGNIFICANT CHANGE UP (ref 0–0.9)
MONOCYTES NFR BLD AUTO: 14 % — SIGNIFICANT CHANGE UP (ref 2–14)
NEUTROPHILS # BLD AUTO: 1.59 K/UL — LOW (ref 1.8–7.4)
NEUTROPHILS NFR BLD AUTO: 74 % — SIGNIFICANT CHANGE UP (ref 43–77)
NRBC # BLD: SIGNIFICANT CHANGE UP /100 WBCS (ref 0–0)
PLATELET # BLD AUTO: 34 K/UL — LOW (ref 150–400)
POTASSIUM SERPL-MCNC: 3.8 MMOL/L — SIGNIFICANT CHANGE UP (ref 3.5–5.3)
POTASSIUM SERPL-SCNC: 3.8 MMOL/L — SIGNIFICANT CHANGE UP (ref 3.5–5.3)
RBC # BLD: 5.23 M/UL — SIGNIFICANT CHANGE UP (ref 4.2–5.8)
RBC # FLD: 24.6 % — HIGH (ref 10.3–14.5)
SODIUM SERPL-SCNC: 138 MMOL/L — SIGNIFICANT CHANGE UP (ref 135–145)
WBC # BLD: 2.15 K/UL — LOW (ref 3.8–10.5)
WBC # FLD AUTO: 2.15 K/UL — LOW (ref 3.8–10.5)

## 2021-10-11 PROCEDURE — 99223 1ST HOSP IP/OBS HIGH 75: CPT

## 2021-10-11 PROCEDURE — 99232 SBSQ HOSP IP/OBS MODERATE 35: CPT

## 2021-10-11 RX ORDER — SODIUM CHLORIDE 9 MG/ML
250 INJECTION INTRAMUSCULAR; INTRAVENOUS; SUBCUTANEOUS ONCE
Refills: 0 | Status: COMPLETED | OUTPATIENT
Start: 2021-10-11 | End: 2021-10-11

## 2021-10-11 RX ADMIN — ATORVASTATIN CALCIUM 40 MILLIGRAM(S): 80 TABLET, FILM COATED ORAL at 21:51

## 2021-10-11 RX ADMIN — PANTOPRAZOLE SODIUM 40 MILLIGRAM(S): 20 TABLET, DELAYED RELEASE ORAL at 11:15

## 2021-10-11 RX ADMIN — Medication 480 MICROGRAM(S): at 11:46

## 2021-10-11 RX ADMIN — PANTOPRAZOLE SODIUM 40 MILLIGRAM(S): 20 TABLET, DELAYED RELEASE ORAL at 21:51

## 2021-10-11 RX ADMIN — SODIUM CHLORIDE 500 MILLILITER(S): 9 INJECTION INTRAMUSCULAR; INTRAVENOUS; SUBCUTANEOUS at 05:26

## 2021-10-11 NOTE — PROGRESS NOTE ADULT - SUBJECTIVE AND OBJECTIVE BOX
CHIEF COMPLAINT:87 y/o male with a PMHx of polycythemia vera, prostate CA, rheumatic fever, CAD s/p CABG,  Afib s/p PPM on eliquis, HTN, Afib presents to the ED c/o ecchymosis and dark stools for the last several days following first dose of chemo on 9/27 for lung cancer.  has been followed by Dr. Vazquez for several years but just received started chemo. sunce has fet generalized weakness fatigue and noticed dark stools, but gross blood.  prior to treatment is in relatively good shape, still excersies and practicing as a malpractice .  denies any other acute issues      In ED, noted to have mild anemia but neutropenic at wbc-1.09.  guiac held by to neurtropenic.  no BM since PTA.  afebrile.      10/10 - no cp palps sob lightheadedness despite low BP; sitting up in chair, pleasant, conversant     Vital Signs Last 24 Hrs  T(F): 98.3 (11 Oct 2021 14:07), Max: 98.4 (10 Oct 2021 21:00)  HR: 71 (11 Oct 2021 14:07) (69 - 71)  BP: 91/55 (11 Oct 2021 14:07) (85/43 - 114/51)  RR: 16 (11 Oct 2021 14:07) (16 - 18)  SpO2: 99% (11 Oct 2021 14:07) (99% - 100%)  Constitutional: NAD, awake and alert  HEENT: PERR, EOMI  Neck: Soft and supple,  No JVD  Respiratory: Breath sounds are clear bilaterally, No wheezing, rales or rhonchi  Cardiovascular: S1 and S2, regular rate and rhythm, no Murmurs  Gastrointestinal: Bowel Sounds present, soft, nontender, nondistended  Extremities: No peripheral edema  Vascular: 2+ peripheral pulses  Neurological: A/O x 3, no focal deficits  Musculoskeletal: 5/5 strength b/l upper and lower extremities  Skin: No rashes    MEDICATIONS:  MEDICATIONS  (STANDING):  aMIOdarone    Tablet 200 milliGRAM(s) Oral <User Schedule>  atorvastatin 40 milliGRAM(s) Oral at bedtime  furosemide    Tablet 40 milliGRAM(s) Oral daily  lisinopril 5 milliGRAM(s) Oral daily  metoprolol tartrate Oral Tab/Cap - Peds 50 milliGRAM(s) Oral daily  potassium chloride   Powder 20 milliEquivalent(s) Oral daily      LABS: All Labs Reviewed:                      9.6    0.80  )-----------( 44       ( 10 Oct 2021 07:00 )             35.0  138  |  105  |  14  ----------------------------<  94  4.0   |  29  |  0.84  Ca    8.4<L>      10 Oct 2021 07:00  Mg     2.1     10-09  TPro  6.5  /  Alb  3.1<L>  /  TBili  0.6  /  DBili  x   /  AST  42<H>  /  ALT  67  /  AlkPhos  92  10-09  PT/INR - ( 09 Oct 2021 14:48 )   PT: 12.0 sec;   INR: 1.04 ratio    PTT - ( 09 Oct 2021 14:48 )  PTT:32.0 sec    RADIOLOGY/EKG:  < from: CT Head No Cont (10.09.21 @ 13:51) >  IMPRESSION:  Mild chronic microvascular changes without evidence of an acute transcortical infarction or hemorrhage.       CHIEF COMPLAINT:85 y/o male with a PMHx of polycythemia vera, prostate CA, rheumatic fever, CAD s/p CABG,  Afib s/p PPM on eliquis, HTN, Afib presents to the ED c/o ecchymosis and dark stools for the last several days following first dose of chemo on 9/27 for lung cancer.  has been followed by Dr. Vazquez for several years but just received started chemo. sunce has fet generalized weakness fatigue and noticed dark stools, but gross blood.  prior to treatment is in relatively good shape, still excersies and practicing as a malpractice .  denies any other acute issues      In ED, noted to have mild anemia but neutropenic at wbc-1.09.  guiac held by to neurtropenic.  no BM since PTA.  afebrile.      10/10 - no cp palps sob lightheadedness despite low BP; sitting up in chair, pleasant, conversant   10/11 - little tired, no cp palps, liberalized diet and allowing patient to drink water as tolerated as BPs low.    Vital Signs Last 24 Hrs  T(F): 98.3 (11 Oct 2021 14:07), Max: 98.4 (10 Oct 2021 21:00)  HR: 71 (11 Oct 2021 14:07) (69 - 71)  BP: 91/55 (11 Oct 2021 14:07) (85/43 - 114/51)  RR: 16 (11 Oct 2021 14:07) (16 - 18)  SpO2: 99% (11 Oct 2021 14:07) (99% - 100%)  Constitutional: NAD, awake and alert  HEENT: PERR, EOMI  Neck: Soft and supple,  No JVD  Respiratory: Breath sounds are clear bilaterally, No wheezing, rales or rhonchi  Cardiovascular: S1 and S2, regular rate and rhythm, no Murmurs  Gastrointestinal: Bowel Sounds present, soft, nontender, nondistended  Extremities: No peripheral edema  Vascular: 2+ peripheral pulses  Neurological: A/O x 3, no focal deficits  Musculoskeletal: 5/5 strength b/l upper and lower extremities  Skin: No rashes    RADIOLOGY/EKG:  < from: CT Head No Cont (10.09.21 @ 13:51) >  IMPRESSION:  Mild chronic microvascular changes without evidence of an acute transcortical infarction or hemorrhage.    LABS: All Labs Reviewed:                      9.6    0.80  )-----------( 44       ( 10 Oct 2021 07:00 )             35.0    labs reviewed from [facility]:  9/2021 -  Hb 13.3, WBC 9., platelets 447  10/11/21 - ANC 1.59, WBC 2.15, Hb 9.2, platelets 34    acetaminophen   Tablet .. 650 milliGRAM(s) Oral every 6 hours PRN  aluminum hydroxide/magnesium hydroxide/simethicone Suspension 30 milliLiter(s) Oral every 4 hours PRN  aMIOdarone    Tablet 200 milliGRAM(s) Oral <User Schedule>  atorvastatin 40 milliGRAM(s) Oral at bedtime  filgrastim-sndz (ZARXIO) Injectable 480 MICROGram(s) SubCutaneous daily  furosemide    Tablet 20 milliGRAM(s) Oral daily  melatonin 3 milliGRAM(s) Oral at bedtime PRN  metoprolol succinate ER 25 milliGRAM(s) Oral daily  ondansetron Injectable 4 milliGRAM(s) IV Push every 8 hours PRN  pantoprazole  Injectable 40 milliGRAM(s) IV Push every 12 hours  potassium chloride   Powder 20 milliEquivalent(s) Oral daily

## 2021-10-11 NOTE — CONSULT NOTE ADULT - ASSESSMENT
86 year old man with PCV, prostate cancer, CAD s/p CAGB, afib on eliquis, HTN, lung cancer recently started on chemo, admitted with bruising and dark stools for the past few days.     Likely having slow GI bleeding due to pancytopenia and anticoagulation. He is stable and on the floor.   I think the best option is to medically manage him with BID PPI for now and holding A/C, and reversing any coagulopathy considering these are likely the causes of slow oozing and the fact that he is profoundly neutropenic.     We will follow along closely and if risk/benefit changes then we would plan for endoscopy but now will hold off.     I am ok with giving him clears and advancing his diet as tolerated.     Ensure good IV access, two large bore IV's.

## 2021-10-11 NOTE — CONSULT NOTE ADULT - SUBJECTIVE AND OBJECTIVE BOX
full note to follow. no plans for endoscopy Patient is a 86y old  Male who presents with a chief complaint of anemia (11 Oct 2021 07:20)    86 year old man with PCV, prostate cancer, CAD s/p CAGB, afib on eliquis, HTN, lung cancer recently started on chemo, admitted with bruising and dark stools for the past few days.     Patient states that typically his stools are brown and formed. For that past few days his stools are much darker than usual. He denies any melena or jordan hematochezia, no hematemesis. He endorses feeling more progressively weak over the past few days; usually he is spry and walks/can exercise. No abdominal pain. Able to tolerate meals.  No travel history or sick contacts.     Denies any type of endoscopy and did have colonoscopy a few years back but doesn't remember who, was told normal.       PAST MEDICAL & SURGICAL HISTORY:  Atrial fibrillation    Hypertension    CAD (coronary artery disease)  stent in 2007    Pacemaker    Rheumatic fever  child traylor    Prostate ca  radiation treatment 5yrs ago    Symptomatic bradycardia    Polycythemia vera    Artificial cardiac pacemaker  2006    History of PTCA  with stent to RCA 2007        MEDICATIONS  (STANDING):  aMIOdarone    Tablet 200 milliGRAM(s) Oral <User Schedule>  atorvastatin 40 milliGRAM(s) Oral at bedtime  filgrastim-sndz (ZARXIO) Injectable 480 MICROGram(s) SubCutaneous daily  furosemide    Tablet 20 milliGRAM(s) Oral daily  metoprolol succinate ER 25 milliGRAM(s) Oral daily  pantoprazole  Injectable 40 milliGRAM(s) IV Push every 12 hours  potassium chloride   Powder 20 milliEquivalent(s) Oral daily  sodium chloride 0.9%. 1000 milliLiter(s) (50 mL/Hr) IV Continuous <Continuous>    MEDICATIONS  (PRN):  acetaminophen   Tablet .. 650 milliGRAM(s) Oral every 6 hours PRN Temp greater or equal to 38C (100.4F), Mild Pain (1 - 3)  aluminum hydroxide/magnesium hydroxide/simethicone Suspension 30 milliLiter(s) Oral every 4 hours PRN Dyspepsia  melatonin 3 milliGRAM(s) Oral at bedtime PRN Insomnia  ondansetron Injectable 4 milliGRAM(s) IV Push every 8 hours PRN Nausea and/or Vomiting      Allergies    No Known Allergies    Intolerances        SOCIAL HISTORY: no current smoking, drinking or drugs    FAMILY HISTORY:  Family history of bronchiectasis (Mother)        REVIEW OF SYSTEMS:    CONSTITUTIONAL: + weakness, fevers or chills  EYES/ENT: No visual changes;  No vertigo or throat pain   NECK: No pain or stiffness  RESPIRATORY: No cough, wheezing, hemoptysis; No shortness of breath  CARDIOVASCULAR: No chest pain or palpitations  GASTROINTESTINAL:dark stools as per HPI  GENITOURINARY: No dysuria, frequency or hematuria  NEUROLOGICAL: No numbness or weakness  SKIN: No itching, burning, rashes, or lesions , +bruising  PSYCH: Normal mood and affect  All other review of systems is negative unless indicated above.    Vital Signs Last 24 Hrs  T(C): 36.4 (11 Oct 2021 09:45), Max: 36.9 (10 Oct 2021 21:00)  T(F): 97.5 (11 Oct 2021 09:45), Max: 98.4 (10 Oct 2021 21:00)  HR: 70 (11 Oct 2021 09:45) (69 - 73)  BP: 85/43 (11 Oct 2021 09:45) (83/54 - 114/51)  BP(mean): --  RR: 18 (11 Oct 2021 09:45) (18 - 18)  SpO2: 100% (11 Oct 2021 09:45) (98% - 100%)    PHYSICAL EXAM:    Constitutional: No acute distress, elderly but appropriate and well-developed, non-toxic appearing  HEENT: unmasked, good phonation, not icteric  Neck: supple, no lymphadenopathy  Respiratory: clear to ascultation bilaterally, no wheezing  Cardiovascular: S1 and S2, regular rate and rhythm, no murmurs rubs or gallops  Gastrointestinal: soft, non-tender, non-distended, +bowel sounds, no rebound or guarding, no surgical scars, no drains  Extremities: No peripheral edema, no cyanosis or clubbing  Vascular: 2+ peripheral pulses, no venous stasis  Neurological: A/O x 3, no focal deficits, no asterixis  Psychiatric: Normal mood, normal affect  Skin: No rashes, not jaundiced, +mild bruising    LABS:                        9.2    2.15  )-----------( 34       ( 11 Oct 2021 06:54 )             33.2     10-11    138  |  105  |  11  ----------------------------<  108<H>  3.8   |  25  |  0.91    Ca    8.2<L>      11 Oct 2021 06:54  Mg     2.0     10-11    TPro  6.5  /  Alb  3.1<L>  /  TBili  0.6  /  DBili  x   /  AST  42<H>  /  ALT  67  /  AlkPhos  92  10-09    PT/INR - ( 09 Oct 2021 14:48 )   PT: 12.0 sec;   INR: 1.04 ratio         PTT - ( 09 Oct 2021 14:48 )  PTT:32.0 sec  LIVER FUNCTIONS - ( 09 Oct 2021 11:17 )  Alb: 3.1 g/dL / Pro: 6.5 gm/dL / ALK PHOS: 92 U/L / ALT: 67 U/L / AST: 42 U/L / GGT: x

## 2021-10-11 NOTE — CDI QUERY NOTE - NSCDIOTHERTXTBX_GEN_ALL_CORE_HH
Cause and effect    Please document the relationship between the following conditions:    A. Symptomatic anemia due to GI bleed associated with Eliquis  B. Symptomatic anemia due to GI bleed not associated with Eliquis  C. Other (please specify)  D. Unable to determine      SUPPORTING DOCUMENTATION AND/OR CLINICAL EVIDENCE:    -GI note 10/11 admitted with bruising and dark stools for the past few days. Likely having slow GI bleeding due to pancytopenia and anticoagulation. with BID PPI for now and holding A/C, and reversing any coagulopathy considering these are likely the causes of slow oozing and the fact that he is profoundly neutropenic.     -Hem/Onc 10/10 GI bleed on Eliquis - s/p kcentra and one unit plt in ER - no further reports of gi bleed or dark stools- GI eval pending,last chemo 9/27 without Neulasta     -Medicine note 10/10/21 dark stools on eliquis with associated with generalized fatigue and weakness.  guiac deferred due to neutropenia, consistent with symptomatic anemia due to GIB on eliquis. Cause and effect    Please document the relationship between the following conditions: Anemia, GI bleed, Eliquis     A. Symptomatic anemia due to GI bleed associated with Eliquis  B. Symptomatic anemia due to GI bleed not associated with Eliquis  C. Other (please specify)  D. Unable to determine      SUPPORTING DOCUMENTATION AND/OR CLINICAL EVIDENCE:    -GI note 10/11 admitted with bruising and dark stools for the past few days. Likely having slow GI bleeding due to pancytopenia and anticoagulation. with BID PPI for now and holding A/C, and reversing any coagulopathy considering these are likely the causes of slow oozing and the fact that he is profoundly neutropenic.     -Hem/Onc 10/10 GI bleed on Eliquis - s/p kcentra and one unit plt in ER - no further reports of gi bleed or dark stools- GI eval pending,last chemo 9/27 without Neulasta     -Medicine note 10/10/21 dark stools on eliquis with associated with generalized fatigue and weakness.  guiac deferred due to neutropenia, consistent with symptomatic anemia due to GIB on eliquis.

## 2021-10-11 NOTE — PROVIDER CONTACT NOTE (OTHER) - BACKGROUND
Pt admitted for GI bleed and neutropenia.
Pt admitted for GI bleed and neutropenia. Pt was hypotensive earlier today, fluids were given low rate over a  few hours with slight improvement in b/p

## 2021-10-11 NOTE — PROVIDER CONTACT NOTE (OTHER) - ACTION/TREATMENT ORDERED:
OK with MD, will continue to monitor patient and vital signs.
OK to give 250cc bolus as per MD. Will continue to monitor patient

## 2021-10-11 NOTE — PROGRESS NOTE ADULT - SUBJECTIVE AND OBJECTIVE BOX
seen by GI this aM    acetaminophen   Tablet .. 650 milliGRAM(s) Oral every 6 hours PRN  aluminum hydroxide/magnesium hydroxide/simethicone Suspension 30 milliLiter(s) Oral every 4 hours PRN  aMIOdarone    Tablet 200 milliGRAM(s) Oral <User Schedule>  atorvastatin 40 milliGRAM(s) Oral at bedtime  filgrastim-sndz (ZARXIO) Injectable 480 MICROGram(s) SubCutaneous daily  furosemide    Tablet 20 milliGRAM(s) Oral daily  melatonin 3 milliGRAM(s) Oral at bedtime PRN  metoprolol succinate ER 25 milliGRAM(s) Oral daily  ondansetron Injectable 4 milliGRAM(s) IV Push every 8 hours PRN  pantoprazole  Injectable 40 milliGRAM(s) IV Push every 12 hours  potassium chloride   Powder 20 milliEquivalent(s) Oral daily  sodium chloride 0.9%. 1000 milliLiter(s) IV Continuous <Continuous>      No Known Allergies      ROS otherwise negative     T(C): 36.8 (10-11-21 @ 05:16), Max: 36.9 (10-10-21 @ 21:00)  HR: 69 (10-11-21 @ 06:11) (69 - 73)  BP: 114/51 (10-11-21 @ 06:11) (83/54 - 125/69)  RR: 18 (10-11-21 @ 05:16) (16 - 18)  SpO2: 100% (10-11-21 @ 05:16) (98% - 100%)  PHYSICAL EXAM  Gen:  laying in bed, nad  H:  anicteric, eomi  Ext:  no edema                          9.2    2.15  )-----------( 34       ( 11 Oct 2021 06:54 )             33.2                         9.6    0.80  )-----------( 44       ( 10 Oct 2021 07:00 )             35.0                         9.4    1.02  )-----------( 49       ( 09 Oct 2021 22:25 )             33.4   10-11    138  |  105  |  11  ----------------------------<  108<H>  3.8   |  25  |  0.91    Ca    8.2<L>      11 Oct 2021 06:54  Mg     2.0     10-11    TPro  6.5  /  Alb  3.1<L>  /  TBili  0.6  /  DBili  x   /  AST  42<H>  /  ALT  67  /  AlkPhos  92  10-09

## 2021-10-11 NOTE — PROGRESS NOTE ADULT - ASSESSMENT
85 y/o male with a PMHx of polycythemia vera, prostate CA, rheumatic fever, CAD s/p CABG,  Afib s/p PPM on eliquis, HTN, Stage IV lung Ca s/p chemo on 9/27 at [facility] presents to the ED c/o ecchymosis and dark stools for the last several days    Stage IV Lung Ca   - follows at [facility] ; last chemo 9/27 without Neulasta     GI bleed on Eliquis   - s/p kcentra and one unit plt in ER   - no further reports of gi bleed or dark stools  - GI eval this AM decided to hold off on EGD  -monitor CBC    Pancytopenia sec to chemo   - started filgrastrim on 10/10 w improvement in WBC.  woudl give one mroe dose today  - transfuse for hb <7, plts <20    We will be happy to answer any onc questions on behalf of [facility] and will be in touch with them.    Pete Espinal MD  Hematology/Oncology  Weekends and nights please call [phone] for MD on call  Cell:  [phone]  Office Phone: [phone]  Office Fax:  [phone]  [address] [place] [address]  85 y/o male with a PMHx of polycythemia vera, prostate CA, rheumatic fever, CAD s/p CABG,  Afib s/p PPM on eliquis, HTN, Stage IV lung Ca s/p [facility] presents to the ED c/o ecchymosis and dark stools for the last several days    Stage IV Lung Ca   - [facility] ; last chemo 9/27 without Neulasta     GI bleed on Eliquis   - s/p kcentra and one unit plt in ER   - no further reports of gi bleed or dark stools  - GI eval this AM decided to hold off on EGD  -monitor CBC  -hold AC until he follows up with cardiologist as outpatient.    Pancytopenia sec to chemo   - started filgrastrim on 10/10 w improvement in WBC.  would give one more dose today  - transfuse for hb <7, plts <20    We will be happy to answer any onc questions on behalf of [facility] and will be in touch with them.    Pete Espinal MD  Hematology/Oncology  Weekends and nights please call [phone] for MD on call  Cell:  [phone]  Office Phone: [phone]  Office Fax:  [phone]  [address] [place] [address]

## 2021-10-11 NOTE — PROGRESS NOTE ADULT - ASSESSMENT
dark stools on eliquis with associated with generalized fatigue and weakness.    guiac deferred due to neutropenia, consistent with symptomatic anemia due to GIB on eliquis.    s/p kcentra and 1U plt in ED  -admit to med/surg   -onco consult  and gi consult   -neutropenic precautions   10/10 - monitor H&H, cont to hold asa and eliquis     pancytopenia, likely related to chemo (unsure of med received  -hem/onco consult   10/10 - appreciate Heme Onc help - start filgrastim     cad, s/p cabg   -cont bb, lasix with parameters   10/10 - due to low BPs - I made the following changes to his home meds:  Halved metopolol to 25, lasix to 20; stopped the ACEI     afib s/p PPM on eliquis   -cont metoprolol and amiodarone   10/10 - see above changes     dvt prophylaxis   -contraindicated due to likely GIB     discussed with GI AND DAVE and wife at bedside   pt sees Dr AGOSTO at [facility]    dark stools on eliquis with associated with generalized fatigue and weakness.    guiac deferred due to neutropenia, consistent with symptomatic anemia due to GIB on eliquis.    s/p kcentra and 1U plt in ED  -admit to med/surg   -onco consult  and gi consult   -neutropenic precautions   10/10 - monitor H&H, cont to hold asa and eliquis   10/11 - CBC improving but platelets still low and will cont to hold ASA and ELiquis     pancytopenia, likely related to chemo (unsure of med received  -hem/onco consult   10/10 - appreciate Heme Onc help - start filgrastim   10/11 - cont filgrastim    cad, s/p cabg   -cont bb, lasix with parameters   10/10 - due to low BPs - I made the following changes to his home meds:  Halved metopolol to 25, lasix to 20; stopped the ACEI     afib s/p PPM on eliquis   -cont metoprolol and amiodarone   10/10 - see above changes     dvt prophylaxis   -contraindicated due to likely GIB     [facility] [place] [facility]   [facility]     10/11 FULL CODE, see GO discussion above

## 2021-10-11 NOTE — PROGRESS NOTE ADULT - CONVERSATION DETAILS
Mr Davis and I spoke about his progression of disease that necessitated chemo.  In the morning we spoke about what resuscitation and intubation are etc.  He said he would not want any of that but if he signed the papers for molst dnr dni that would upset his wife.  I returned later in the evening to see his wife and him for a GOC meeting.  Mrs Davis was not there - and the patient and I agreed that he would sit down with Dr Vazquez and have a jordan conversation about his prognosis and GOC  in a clinic setting which would be less terrifying for his wife.  At this time he does not want to sign any papers and would by default be full code.

## 2021-10-11 NOTE — PROVIDER CONTACT NOTE (OTHER) - ASSESSMENT
Pt denies any headache, dizziness, SOB... Pt states he is comfortable and is asymptomatic. Pt vitals 95/55, HR:70, temp: 98, spo2:100% RR:18
Pt denies any dizziness, headache of SOB. Other vital signs stable, temp 98.3, spo2 100% and RR:18

## 2021-10-12 ENCOUNTER — TRANSCRIPTION ENCOUNTER (OUTPATIENT)
Age: 86
End: 2021-10-12

## 2021-10-12 VITALS
TEMPERATURE: 98 F | RESPIRATION RATE: 17 BRPM | HEART RATE: 101 BPM | OXYGEN SATURATION: 100 % | SYSTOLIC BLOOD PRESSURE: 103 MMHG | DIASTOLIC BLOOD PRESSURE: 50 MMHG

## 2021-10-12 LAB
ANION GAP SERPL CALC-SCNC: 8 MMOL/L — SIGNIFICANT CHANGE UP (ref 5–17)
BASOPHILS # BLD AUTO: 0.03 K/UL — SIGNIFICANT CHANGE UP (ref 0–0.2)
BASOPHILS NFR BLD AUTO: 1.5 % — SIGNIFICANT CHANGE UP (ref 0–2)
BUN SERPL-MCNC: 10 MG/DL — SIGNIFICANT CHANGE UP (ref 7–23)
CALCIUM SERPL-MCNC: 8.1 MG/DL — LOW (ref 8.5–10.1)
CHLORIDE SERPL-SCNC: 103 MMOL/L — SIGNIFICANT CHANGE UP (ref 96–108)
CO2 SERPL-SCNC: 25 MMOL/L — SIGNIFICANT CHANGE UP (ref 22–31)
CREAT SERPL-MCNC: 0.8 MG/DL — SIGNIFICANT CHANGE UP (ref 0.5–1.3)
EOSINOPHIL # BLD AUTO: 0.12 K/UL — SIGNIFICANT CHANGE UP (ref 0–0.5)
EOSINOPHIL NFR BLD AUTO: 6 % — SIGNIFICANT CHANGE UP (ref 0–6)
GLUCOSE SERPL-MCNC: 84 MG/DL — SIGNIFICANT CHANGE UP (ref 70–99)
HCT VFR BLD CALC: 32.9 % — LOW (ref 39–50)
HGB BLD-MCNC: 9.2 G/DL — LOW (ref 13–17)
LYMPHOCYTES # BLD AUTO: 0.47 K/UL — LOW (ref 1–3.3)
LYMPHOCYTES # BLD AUTO: 24 % — SIGNIFICANT CHANGE UP (ref 13–44)
MAGNESIUM SERPL-MCNC: 2 MG/DL — SIGNIFICANT CHANGE UP (ref 1.6–2.6)
MCHC RBC-ENTMCNC: 17.9 PG — LOW (ref 27–34)
MCHC RBC-ENTMCNC: 28 GM/DL — LOW (ref 32–36)
MCV RBC AUTO: 64.1 FL — LOW (ref 80–100)
MONOCYTES # BLD AUTO: 0.11 K/UL — SIGNIFICANT CHANGE UP (ref 0–0.9)
MONOCYTES NFR BLD AUTO: 5.5 % — SIGNIFICANT CHANGE UP (ref 2–14)
NEUTROPHILS # BLD AUTO: 1.21 K/UL — LOW (ref 1.8–7.4)
NEUTROPHILS NFR BLD AUTO: 58 % — SIGNIFICANT CHANGE UP (ref 43–77)
NRBC # BLD: SIGNIFICANT CHANGE UP /100 WBCS (ref 0–0)
PLATELET # BLD AUTO: 41 K/UL — LOW (ref 150–400)
POTASSIUM SERPL-MCNC: 3.2 MMOL/L — LOW (ref 3.5–5.3)
POTASSIUM SERPL-SCNC: 3.2 MMOL/L — LOW (ref 3.5–5.3)
RBC # BLD: 5.13 M/UL — SIGNIFICANT CHANGE UP (ref 4.2–5.8)
RBC # FLD: 24.1 % — HIGH (ref 10.3–14.5)
SODIUM SERPL-SCNC: 136 MMOL/L — SIGNIFICANT CHANGE UP (ref 135–145)
WBC # BLD: 1.97 K/UL — LOW (ref 3.8–10.5)
WBC # FLD AUTO: 1.97 K/UL — LOW (ref 3.8–10.5)

## 2021-10-12 PROCEDURE — 99239 HOSP IP/OBS DSCHRG MGMT >30: CPT

## 2021-10-12 RX ORDER — FUROSEMIDE 40 MG
1 TABLET ORAL
Qty: 0 | Refills: 0 | DISCHARGE

## 2021-10-12 RX ORDER — CX-024414 0.2 MG/ML
0.5 INJECTION, SUSPENSION INTRAMUSCULAR
Qty: 0 | Refills: 0 | DISCHARGE

## 2021-10-12 RX ORDER — CHOLECALCIFEROL (VITAMIN D3) 125 MCG
1 CAPSULE ORAL
Qty: 30 | Refills: 0
Start: 2021-10-12 | End: 2021-11-10

## 2021-10-12 RX ORDER — POTASSIUM CHLORIDE 20 MEQ
40 PACKET (EA) ORAL EVERY 4 HOURS
Refills: 0 | Status: COMPLETED | OUTPATIENT
Start: 2021-10-12 | End: 2021-10-12

## 2021-10-12 RX ORDER — METOPROLOL TARTRATE 50 MG
1 TABLET ORAL
Qty: 0 | Refills: 0 | DISCHARGE

## 2021-10-12 RX ORDER — POTASSIUM CHLORIDE 20 MEQ
1 PACKET (EA) ORAL
Qty: 0 | Refills: 0 | DISCHARGE

## 2021-10-12 RX ORDER — ASPIRIN/CALCIUM CARB/MAGNESIUM 324 MG
1 TABLET ORAL
Qty: 0 | Refills: 0 | DISCHARGE

## 2021-10-12 RX ORDER — PANTOPRAZOLE SODIUM 20 MG/1
1 TABLET, DELAYED RELEASE ORAL
Qty: 60 | Refills: 0
Start: 2021-10-12 | End: 2021-11-10

## 2021-10-12 RX ORDER — FUROSEMIDE 40 MG
1 TABLET ORAL
Qty: 30 | Refills: 0
Start: 2021-10-12 | End: 2021-11-10

## 2021-10-12 RX ORDER — APIXABAN 2.5 MG/1
1 TABLET, FILM COATED ORAL
Qty: 0 | Refills: 0 | DISCHARGE

## 2021-10-12 RX ORDER — METOPROLOL TARTRATE 50 MG
1 TABLET ORAL
Qty: 30 | Refills: 0
Start: 2021-10-12 | End: 2021-11-10

## 2021-10-12 RX ORDER — POTASSIUM CHLORIDE 20 MEQ
1 PACKET (EA) ORAL
Qty: 30 | Refills: 0
Start: 2021-10-12 | End: 2021-11-10

## 2021-10-12 RX ADMIN — Medication 20 MILLIEQUIVALENT(S): at 12:40

## 2021-10-12 RX ADMIN — Medication 40 MILLIEQUIVALENT(S): at 11:37

## 2021-10-12 RX ADMIN — AMIODARONE HYDROCHLORIDE 200 MILLIGRAM(S): 400 TABLET ORAL at 08:26

## 2021-10-12 RX ADMIN — PANTOPRAZOLE SODIUM 40 MILLIGRAM(S): 20 TABLET, DELAYED RELEASE ORAL at 08:26

## 2021-10-12 RX ADMIN — Medication 40 MILLIEQUIVALENT(S): at 14:57

## 2021-10-12 RX ADMIN — Medication 480 MICROGRAM(S): at 08:27

## 2021-10-12 NOTE — PROGRESS NOTE ADULT - SUBJECTIVE AND OBJECTIVE BOX
no new events    acetaminophen   Tablet .. 650 milliGRAM(s) Oral every 6 hours PRN  aluminum hydroxide/magnesium hydroxide/simethicone Suspension 30 milliLiter(s) Oral every 4 hours PRN  aMIOdarone    Tablet 200 milliGRAM(s) Oral <User Schedule>  atorvastatin 40 milliGRAM(s) Oral at bedtime  filgrastim-sndz (ZARXIO) Injectable 480 MICROGram(s) SubCutaneous daily  furosemide    Tablet 20 milliGRAM(s) Oral daily  melatonin 3 milliGRAM(s) Oral at bedtime PRN  metoprolol succinate ER 25 milliGRAM(s) Oral daily  ondansetron Injectable 4 milliGRAM(s) IV Push every 8 hours PRN  pantoprazole  Injectable 40 milliGRAM(s) IV Push every 12 hours  potassium chloride   Powder 20 milliEquivalent(s) Oral daily      No Known Allergies      ROS otherwise negative     T(C): 36.9 (10-12-21 @ 08:19), Max: 37.1 (10-11-21 @ 22:46)  HR: 100 (10-12-21 @ 08:19) (69 - 100)  BP: 93/57 (10-12-21 @ 08:19) (85/43 - 103/53)  RR: 18 (10-12-21 @ 08:19) (16 - 18)  SpO2: 100% (10-12-21 @ 08:19) (98% - 100%)  PHYSICAL EXAM  Gen:  laying in bed, nad  H:  anicteric, eomi  Ext:  no edema                          9.2    2.15  )-----------( 34       ( 11 Oct 2021 06:54 )             33.2                         9.6    0.80  )-----------( 44       ( 10 Oct 2021 07:00 )             35.0                         9.4    1.02  )-----------( 49       ( 09 Oct 2021 22:25 )             33.4   10-11    138  |  105  |  11  ----------------------------<  108<H>  3.8   |  25  |  0.91    Ca    8.2<L>      11 Oct 2021 06:54  Mg     2.0     10-11

## 2021-10-12 NOTE — DISCHARGE NOTE PROVIDER - CARE PROVIDER_API CALL
MAMADOU AGOSTO  Hematology/Oncology  [facility] [place] [address]  Phone: [phone]  Fax: [phone]  Follow Up Time: 1-3 days    Cosme Brothers)  Gastroenterology; Internal Medicine  [address] [place] [address]  Phone: [phone]  Fax: [phone]  Follow Up Time: 1 week    Anderson Orona)  Internal Medicine  [address] [place] [address], [address] [place] [address]  Phone: [phone]  Fax: [phone]  Follow Up Time: 1 week

## 2021-10-12 NOTE — DISCHARGE NOTE PROVIDER - PROVIDER TOKENS
PROVIDER:[TOKEN:[[id number]],FOLLOWUP:[1-3 days]],PROVIDER:[TOKEN:[[id number]],FOLLOWUP:[1 week]],PROVIDER:[TOKEN:[[id number]],FOLLOWUP:[1 week]]

## 2021-10-12 NOTE — PHYSICAL THERAPY INITIAL EVALUATION ADULT - PERTINENT HX OF CURRENT PROBLEM, REHAB EVAL
dark stools on Eliquis with associated with generalized fatigue and weakness. H/H 9.2/33.2. Pt w/ stage IV Lung Ca, last chemo 9/27.

## 2021-10-12 NOTE — DISCHARGE NOTE PROVIDER - NSDCCPCAREPLAN_GEN_ALL_CORE_FT
PRINCIPAL DISCHARGE DIAGNOSIS  Diagnosis: Pancytopenia due to chemotherapy  Assessment and Plan of Treatment: repeat blood work with primary oncologist within 1-2 days, restart ASA once platelets above 50, 000 and restart Elqiuis once platelets > 100, 000  follow up with primary oncologist within 1 week for further management of the cancer      SECONDARY DISCHARGE DIAGNOSES  Diagnosis: Anemia due to GI blood loss  Assessment and Plan of Treatment: hols aspirin and eliquis , take pantoprasole twice daily, follow up with GI within 1 week for further management    Diagnosis: Weakness  Assessment and Plan of Treatment: take vitamin D daily, follow up with PCP within 1 week for further work-up and monitoring    Diagnosis: Hypokalemia  Assessment and Plan of Treatment: take potassium supplements while on lasix ( furosemide)    Diagnosis: HTN (hypertension)  Assessment and Plan of Treatment: take lower doses of metoprolol and lasix - new prescription send to the pharmacy    Diagnosis: CAD (coronary artery disease)  Assessment and Plan of Treatment: hold aspirin for now and eliquis , restart when cleared by oncologist and GI specialist , follow up with cardiologist within 1-2 weeks

## 2021-10-12 NOTE — DISCHARGE NOTE PROVIDER - CARE PROVIDERS DIRECT ADDRESSES
,DirectAddress_Unknown,DirectAddress_Unknown,[email or web address]

## 2021-10-12 NOTE — DISCHARGE NOTE PROVIDER - HOSPITAL COURSE
CHIEF COMPLAINT: gen weakness       87 y/o male with a PMHx of polycythemia vera, prostate CA, rheumatic fever, CAD s/p CABG,  Afib s/p PPM on eliquis, HTN, Stage IV lung Ca s/p chemo on 9/27 at [facility] presents to the ED c/o ecchymosis and dark stools for the last several days.  He has been followed by Dr. Agosto for several years but just received started chemo. He has generalized weakness fatigue and noticed dark stools, but gross blood.  prior to treatment is in relatively good shape, still exercising and practicing as a malpractice .  denies any other acute issues    In ED, noted to have mild anemia but neutropenic at wbc-1.09.  guiac held by to neurtropenic.  no BM since PTA.  afebrile.      10/10 - no cp palps sob lightheadedness despite low BP; sitting up in chair, pleasant, conversant   10/11 - little tired, no cp palps, liberalized diet and allowing patient to drink water as tolerated as BPs low.  10/12 - pt seen and examined, feels well except gen weakness, afebrile, tolerating po intake, afebrile, plan discussed     Review of system- Rest of the review of system are negative except mentioned in HPI    Vitals reviewed for last 24 hours  T(C): 36.9 (10-12-21 @ 08:19), Max: 37.1 (10-11-21 @ 22:46)  T(F): 98.5 (10-12-21 @ 08:19), Max: 98.8 (10-11-21 @ 22:46)  HR: 100 (10-12-21 @ 08:19) (71 - 100)  BP: 93/57 (10-12-21 @ 08:19) (91/55 - 103/53)  RR: 18 (10-12-21 @ 08:19) (16 - 18)  SpO2: 100% (10-12-21 @ 08:19) (98% - 100%)  Wt(kg): --  PE :   Constitutional: NAD, awake and alert  HEENT: PERR, EOMI  Neck: Soft and supple,  No JVD  Respiratory: Breath sounds are clear bilaterally, No wheezing, rales or rhonchi  Cardiovascular: S1 and S2, regular rate and rhythm, no Murmurs  Gastrointestinal: Bowel Sounds present, soft, nontender, nondistended  Extremities: No peripheral edema  Vascular: 2+ peripheral pulses  Neurological: A/O x 3, no focal deficits  Musculoskeletal: 5/5 strength b/l upper and lower extremities  Skin: No rashes    RADIOLOGY reviewed   < from: CT Head No Cont (10.09.21 @ 13:51) >  IMPRESSION:  Mild chronic microvascular changes without evidence of an acute transcortical infarction or hemorrhage.    LABS: All Labs Reviewed:           10-12    136  |  103  |  10  ----------------------------<  84  3.2<L>   |  25  |  0.80    Ca    8.1<L>      12 Oct 2021 08:16  Mg     2.0     10-12                        9.2    1.97  )-----------( 41       ( 12 Oct 2021 08:16 )             32.9   87 y/o male with a PMHx of polycythemia vera, prostate CA, rheumatic fever, CAD s/p CABG,  Afib s/p PPM on eliquis, HTN, Stage IV lung Ca s/p chemo on 9/27 at [facility] presents to the ED c/o ecchymosis and dark stools for the last several days.     Stage 4 lung cancer s/p recent chemo on 9/27   Pancytopenia due to chemotherapy   Symptomatic anemia due to slow GI bleed associated with Eliquis and ASA   Generalized weakness   - guiac deferred due to neutropenia   - s/p kcentra and 1U plt in ED  - onco consult  and gi consult Deneen Hughes   - hold asa and eliquis , restart ASA whne Pl > 50 , eliquis when Pl > 100 d/w Dr. Espinal   - s/p  filgrastim   CAD s/p CABG, A fib s/p PPM on Elquis and ASA   HTN overcontrolled   -cont bb, lasix  lower doses   Halved metopolol to 25, lasix to 20; stopped the ACEI   - c/w  amiodarone   Hypokalemia - replace po , supplements while on lasix   dvt prophylaxis  -contraindicated due to likely GIB     discussed with HemeOnc and [facility] nurse Brooklyn Drew , pt sees Dr AGOSTO at [facility]     AD - FULL CODE    Disposition - medically optimized to be discharged home with close follow up with PCP and oncologist within 1week   return to ED if fever, abdominal pain, nausea, vomiting, chest pain, dyspnea  Discharge plan discussed with patient, RN  Patient advised to follow up with PCP within 3-7 days  time spend 40 min  Discharge note faxed to PCP with my contact information to call me back   PCP Dr. Orona

## 2021-10-12 NOTE — DISCHARGE NOTE PROVIDER - NSDCMRMEDTOKEN_GEN_ALL_CORE_FT
amiodarone 200 mg oral tablet: 1 tab(s) orally every other day  atorvastatin 40 mg oral tablet: 1 tab(s) orally once a day  furosemide 20 mg oral tablet: 1 tab(s) orally once a day  Klor-Con 10 mEq oral tablet, extended release: 1 tab(s) orally once a day   metoprolol succinate 25 mg oral tablet, extended release: 1 tab(s) orally once a day  multivitamin: 1 tab(s) orally once a day  pantoprazole 40 mg oral delayed release tablet: 1 tab(s) orally 2 times a day   Vitamin D3 25 mcg (1000 intl units) oral tablet: 1 tab(s) orally once a day

## 2021-10-12 NOTE — DISCHARGE NOTE NURSING/CASE MANAGEMENT/SOCIAL WORK - NSDCDMENAME_GEN_ALL_CORE_FT
Walker to be delivered to bedside prior to discharge from [facility]

## 2021-10-12 NOTE — DISCHARGE NOTE NURSING/CASE MANAGEMENT/SOCIAL WORK - PATIENT PORTAL LINK FT
You can access the FollowMyHealth Patient Portal offered by [facility] by registering at the following website: [email or web address]. By joining [employer]’s FollowMyHealth portal, you will also be able to view your health information using other applications (apps) compatible with our system.

## 2021-10-21 DIAGNOSIS — I25.10 ATHEROSCLEROTIC HEART DISEASE OF NATIVE CORONARY ARTERY WITHOUT ANGINA PECTORIS: ICD-10-CM

## 2021-10-21 DIAGNOSIS — D61.810 ANTINEOPLASTIC CHEMOTHERAPY INDUCED PANCYTOPENIA: ICD-10-CM

## 2021-10-21 DIAGNOSIS — Z95.0 PRESENCE OF CARDIAC PACEMAKER: ICD-10-CM

## 2021-10-21 DIAGNOSIS — I10 ESSENTIAL (PRIMARY) HYPERTENSION: ICD-10-CM

## 2021-10-21 DIAGNOSIS — Z95.1 PRESENCE OF AORTOCORONARY BYPASS GRAFT: ICD-10-CM

## 2021-10-21 DIAGNOSIS — C34.90 MALIGNANT NEOPLASM OF UNSPECIFIED PART OF UNSPECIFIED BRONCHUS OR LUNG: ICD-10-CM

## 2021-10-21 DIAGNOSIS — E87.6 HYPOKALEMIA: ICD-10-CM

## 2021-10-21 DIAGNOSIS — D63.1 ANEMIA IN CHRONIC KIDNEY DISEASE: ICD-10-CM

## 2021-10-21 DIAGNOSIS — D70.9 NEUTROPENIA, UNSPECIFIED: ICD-10-CM

## 2021-10-21 DIAGNOSIS — Z95.5 PRESENCE OF CORONARY ANGIOPLASTY IMPLANT AND GRAFT: ICD-10-CM

## 2021-10-21 DIAGNOSIS — D45 POLYCYTHEMIA VERA: ICD-10-CM

## 2021-10-21 DIAGNOSIS — T39.015A ADVERSE EFFECT OF ASPIRIN, INITIAL ENCOUNTER: ICD-10-CM

## 2021-10-21 DIAGNOSIS — D68.32 HEMORRHAGIC DISORDER DUE TO EXTRINSIC CIRCULATING ANTICOAGULANTS: ICD-10-CM

## 2021-10-21 DIAGNOSIS — Z85.46 PERSONAL HISTORY OF MALIGNANT NEOPLASM OF PROSTATE: ICD-10-CM

## 2021-10-21 DIAGNOSIS — K92.2 GASTROINTESTINAL HEMORRHAGE, UNSPECIFIED: ICD-10-CM

## 2021-10-21 DIAGNOSIS — I48.91 UNSPECIFIED ATRIAL FIBRILLATION: ICD-10-CM

## 2021-10-21 DIAGNOSIS — Z79.82 LONG TERM (CURRENT) USE OF ASPIRIN: ICD-10-CM

## 2021-10-21 DIAGNOSIS — T45.515A ADVERSE EFFECT OF ANTICOAGULANTS, INITIAL ENCOUNTER: ICD-10-CM

## 2021-10-21 DIAGNOSIS — D69.6 THROMBOCYTOPENIA, UNSPECIFIED: ICD-10-CM

## 2021-10-21 DIAGNOSIS — Z79.01 LONG TERM (CURRENT) USE OF ANTICOAGULANTS: ICD-10-CM

## 2022-02-01 NOTE — ED ADULT TRIAGE NOTE - HEIGHT IN INCHES
1.25 Tazorac Pregnancy And Lactation Text: This medication is not safe during pregnancy. It is unknown if this medication is excreted in breast milk.

## 2022-02-02 NOTE — ED PROVIDER NOTE - RELIEVING FACTORS
Problem: Falls - Risk of:  Goal: Will remain free from falls  Description: Will remain free from falls  Outcome: Ongoing  Goal: Absence of physical injury  Description: Absence of physical injury  Outcome: Ongoing     Problem: Skin Integrity:  Goal: Will show no infection signs and symptoms  Description: Will show no infection signs and symptoms  Outcome: Ongoing  Goal: Absence of new skin breakdown  Description: Absence of new skin breakdown  Outcome: Ongoing     Problem: Nutrition  Goal: Optimal nutrition therapy  2/1/2022 2223 by eR Lutz RN  Outcome: Ongoing  2/1/2022 1521 by Berlin Patrick RD, LD  Outcome: Ongoing  Goal: Understanding of nutritional guidelines  2/1/2022 2223 by Re Lutz RN  Outcome: Ongoing  2/1/2022 1521 by Berlin Patrick RD, LD  Outcome: Ongoing     Problem: Pain:  Goal: Pain level will decrease  Description: Pain level will decrease  Outcome: Ongoing  Goal: Control of acute pain  Description: Control of acute pain  Outcome: Ongoing  Goal: Control of chronic pain  Description: Control of chronic pain  Outcome: Ongoing none

## 2022-02-13 ENCOUNTER — INPATIENT (INPATIENT)
Facility: HOSPITAL | Age: 87
LOS: 11 days | Discharge: NURSING, REHAB OR LONG-TERM CARE | DRG: 871 | End: 2022-02-25
Attending: FAMILY MEDICINE | Admitting: STUDENT IN AN ORGANIZED HEALTH CARE EDUCATION/TRAINING PROGRAM
Payer: COMMERCIAL

## 2022-02-13 VITALS
OXYGEN SATURATION: 75 % | SYSTOLIC BLOOD PRESSURE: 168 MMHG | HEIGHT: 73.25 IN | HEART RATE: 114 BPM | WEIGHT: 210.1 LBS | RESPIRATION RATE: 16 BRPM | TEMPERATURE: 99 F | DIASTOLIC BLOOD PRESSURE: 92 MMHG

## 2022-02-13 DIAGNOSIS — Z98.61 CORONARY ANGIOPLASTY STATUS: Chronic | ICD-10-CM

## 2022-02-13 DIAGNOSIS — Z95.0 PRESENCE OF CARDIAC PACEMAKER: Chronic | ICD-10-CM

## 2022-02-13 LAB
ALBUMIN SERPL ELPH-MCNC: 2.5 G/DL — LOW (ref 3.3–5)
ALP SERPL-CCNC: 67 U/L — SIGNIFICANT CHANGE UP (ref 40–120)
ALT FLD-CCNC: 47 U/L — SIGNIFICANT CHANGE UP (ref 12–78)
ANION GAP SERPL CALC-SCNC: 11 MMOL/L — SIGNIFICANT CHANGE UP (ref 5–17)
APTT BLD: 33.8 SEC — SIGNIFICANT CHANGE UP (ref 27.5–35.5)
AST SERPL-CCNC: 40 U/L — HIGH (ref 15–37)
BASOPHILS # BLD AUTO: 0 K/UL — SIGNIFICANT CHANGE UP (ref 0–0.2)
BASOPHILS NFR BLD AUTO: 0 % — SIGNIFICANT CHANGE UP (ref 0–2)
BILIRUB SERPL-MCNC: 2.5 MG/DL — HIGH (ref 0.2–1.2)
BUN SERPL-MCNC: 20 MG/DL — SIGNIFICANT CHANGE UP (ref 7–23)
CALCIUM SERPL-MCNC: 8.2 MG/DL — LOW (ref 8.5–10.1)
CHLORIDE SERPL-SCNC: 98 MMOL/L — SIGNIFICANT CHANGE UP (ref 96–108)
CO2 SERPL-SCNC: 22 MMOL/L — SIGNIFICANT CHANGE UP (ref 22–31)
CREAT SERPL-MCNC: 1.26 MG/DL — SIGNIFICANT CHANGE UP (ref 0.5–1.3)
EOSINOPHIL # BLD AUTO: 0 K/UL — SIGNIFICANT CHANGE UP (ref 0–0.5)
EOSINOPHIL NFR BLD AUTO: 0 % — SIGNIFICANT CHANGE UP (ref 0–6)
GLUCOSE SERPL-MCNC: 139 MG/DL — HIGH (ref 70–99)
HCT VFR BLD CALC: 28.7 % — LOW (ref 39–50)
HGB BLD-MCNC: 9.7 G/DL — LOW (ref 13–17)
INR BLD: 1.71 RATIO — HIGH (ref 0.88–1.16)
LYMPHOCYTES # BLD AUTO: 0.23 K/UL — LOW (ref 1–3.3)
LYMPHOCYTES # BLD AUTO: 2 % — LOW (ref 13–44)
MCHC RBC-ENTMCNC: 33.8 GM/DL — SIGNIFICANT CHANGE UP (ref 32–36)
MCHC RBC-ENTMCNC: 34.3 PG — HIGH (ref 27–34)
MCV RBC AUTO: 101.4 FL — HIGH (ref 80–100)
MONOCYTES # BLD AUTO: 0.46 K/UL — SIGNIFICANT CHANGE UP (ref 0–0.9)
MONOCYTES NFR BLD AUTO: 4 % — SIGNIFICANT CHANGE UP (ref 2–14)
NEUTROPHILS # BLD AUTO: 10.77 K/UL — HIGH (ref 1.8–7.4)
NEUTROPHILS NFR BLD AUTO: 92 % — HIGH (ref 43–77)
NRBC # BLD: SIGNIFICANT CHANGE UP /100 WBCS (ref 0–0)
NT-PROBNP SERPL-SCNC: 5332 PG/ML — HIGH (ref 0–450)
PLATELET # BLD AUTO: 69 K/UL — LOW (ref 150–400)
POTASSIUM SERPL-MCNC: 4.3 MMOL/L — SIGNIFICANT CHANGE UP (ref 3.5–5.3)
POTASSIUM SERPL-SCNC: 4.3 MMOL/L — SIGNIFICANT CHANGE UP (ref 3.5–5.3)
PROT SERPL-MCNC: 6.4 GM/DL — SIGNIFICANT CHANGE UP (ref 6–8.3)
PROTHROM AB SERPL-ACNC: 19.3 SEC — HIGH (ref 10.6–13.6)
RBC # BLD: 2.83 M/UL — LOW (ref 4.2–5.8)
RBC # FLD: 18.2 % — HIGH (ref 10.3–14.5)
SODIUM SERPL-SCNC: 131 MMOL/L — LOW (ref 135–145)
TROPONIN I, HIGH SENSITIVITY RESULT: 91.23 NG/L — HIGH
WBC # BLD: 11.46 K/UL — HIGH (ref 3.8–10.5)
WBC # FLD AUTO: 11.46 K/UL — HIGH (ref 3.8–10.5)

## 2022-02-13 PROCEDURE — 71275 CT ANGIOGRAPHY CHEST: CPT | Mod: 26,MA

## 2022-02-13 PROCEDURE — 93010 ELECTROCARDIOGRAM REPORT: CPT

## 2022-02-13 PROCEDURE — 99285 EMERGENCY DEPT VISIT HI MDM: CPT | Mod: CS

## 2022-02-13 PROCEDURE — 71045 X-RAY EXAM CHEST 1 VIEW: CPT | Mod: 26

## 2022-02-13 RX ORDER — SODIUM CHLORIDE 9 MG/ML
3000 INJECTION, SOLUTION INTRAVENOUS ONCE
Refills: 0 | Status: COMPLETED | OUTPATIENT
Start: 2022-02-13 | End: 2022-02-13

## 2022-02-13 RX ORDER — ACETAMINOPHEN 500 MG
1000 TABLET ORAL ONCE
Refills: 0 | Status: COMPLETED | OUTPATIENT
Start: 2022-02-13 | End: 2022-02-13

## 2022-02-13 RX ORDER — CEFEPIME 1 G/1
2000 INJECTION, POWDER, FOR SOLUTION INTRAMUSCULAR; INTRAVENOUS ONCE
Refills: 0 | Status: COMPLETED | OUTPATIENT
Start: 2022-02-13 | End: 2022-02-13

## 2022-02-13 RX ORDER — VANCOMYCIN HCL 1 G
1500 VIAL (EA) INTRAVENOUS ONCE
Refills: 0 | Status: COMPLETED | OUTPATIENT
Start: 2022-02-13 | End: 2022-02-13

## 2022-02-13 RX ADMIN — CEFEPIME 100 MILLIGRAM(S): 1 INJECTION, POWDER, FOR SOLUTION INTRAMUSCULAR; INTRAVENOUS at 22:33

## 2022-02-13 RX ADMIN — SODIUM CHLORIDE 3000 MILLILITER(S): 9 INJECTION, SOLUTION INTRAVENOUS at 23:45

## 2022-02-13 RX ADMIN — CEFEPIME 2000 MILLIGRAM(S): 1 INJECTION, POWDER, FOR SOLUTION INTRAMUSCULAR; INTRAVENOUS at 23:10

## 2022-02-13 RX ADMIN — Medication 1000 MILLIGRAM(S): at 22:33

## 2022-02-13 RX ADMIN — SODIUM CHLORIDE 3000 MILLILITER(S): 9 INJECTION, SOLUTION INTRAVENOUS at 22:14

## 2022-02-13 NOTE — ED PROVIDER NOTE - PROGRESS NOTE DETAILS
Liz Laura for attending Dr. Peters:  Pt with rectal temp of 102.3. immunocompromised. Will work up sepsis. Signout to Dr. Major.  Pending septic workup.  Initial sat was false as patient has Raynaud's phenomenon of fingers, more accurate saturation was 92% placed on NC O2.  Plan for admit once workup complete for sepsis in immunocompromised patient. Dallas Peters D.O. KV: s/o pending CTA- no PE, bilateral PNA noted. patient TBA tele.

## 2022-02-13 NOTE — ED PROVIDER NOTE - CONSTITUTIONAL, MLM
Disheveled, awake, alert, oriented to person, place, time/situation and in no apparent distress. normal...

## 2022-02-13 NOTE — ED PROVIDER NOTE - OBJECTIVE STATEMENT
85 y/o male w/ a PMHx of polycythemia vera, symptomatic bradycardia, stage 4 lung cancer on chemo, rheumatic fever, pacemaker, CAD, HTN, and Afib presents to the ED via EMS c/o SOB starting today. Pt also c/o increased fatigue and inability to ambulate x1 week. Denies fever, chills, abd pain, skin changes, or swelling. Pt reports last chemo was 3-4 weeks ago. Pt took no medications PTA. Pt states all his doctors at at [place].

## 2022-02-13 NOTE — ED ADULT TRIAGE NOTE - CHIEF COMPLAINT QUOTE
Pt presents to ED BIBA from home for shortness of breath that started today and weakness x1 week. Pt denies chest pain. Hx of lung CA. O2 75% on RA in EMS triage. Dr. Peters at bedside.

## 2022-02-13 NOTE — ED PROVIDER NOTE - SKIN, MLM
Skin normal color for race, warm, dry and intact. No evidence of rash. Blanching of fingers and toes.

## 2022-02-13 NOTE — ED PROVIDER NOTE - CARDIAC, MLM
Normal rate, regular rhythm.  Heart sounds S1, S2.  No murmurs, rubs or gallops. Decreased perfusions to hands and feet.

## 2022-02-14 DIAGNOSIS — J18.9 PNEUMONIA, UNSPECIFIED ORGANISM: ICD-10-CM

## 2022-02-14 LAB
ADD ON TEST-SPECIMEN IN LAB: SIGNIFICANT CHANGE UP
APPEARANCE UR: CLEAR — SIGNIFICANT CHANGE UP
BILIRUB UR-MCNC: NEGATIVE — SIGNIFICANT CHANGE UP
COLOR SPEC: YELLOW — SIGNIFICANT CHANGE UP
DIFF PNL FLD: ABNORMAL
GLUCOSE UR QL: NEGATIVE — SIGNIFICANT CHANGE UP
KETONES UR-MCNC: ABNORMAL
LACTATE SERPL-SCNC: 2.5 MMOL/L — HIGH (ref 0.7–2)
LACTATE SERPL-SCNC: 3.6 MMOL/L — HIGH (ref 0.7–2)
LEUKOCYTE ESTERASE UR-ACNC: ABNORMAL
NITRITE UR-MCNC: NEGATIVE — SIGNIFICANT CHANGE UP
PH UR: 6 — SIGNIFICANT CHANGE UP (ref 5–8)
PROCALCITONIN SERPL-MCNC: 0.8 NG/ML — HIGH (ref 0.02–0.1)
PROT UR-MCNC: SIGNIFICANT CHANGE UP
RAPID RVP RESULT: SIGNIFICANT CHANGE UP
SARS-COV-2 RNA SPEC QL NAA+PROBE: SIGNIFICANT CHANGE UP
SP GR SPEC: 1.01 — SIGNIFICANT CHANGE UP (ref 1.01–1.02)
UROBILINOGEN FLD QL: 4

## 2022-02-14 PROCEDURE — 82607 VITAMIN B-12: CPT

## 2022-02-14 PROCEDURE — 80048 BASIC METABOLIC PNL TOTAL CA: CPT

## 2022-02-14 PROCEDURE — 82728 ASSAY OF FERRITIN: CPT

## 2022-02-14 PROCEDURE — 84145 PROCALCITONIN (PCT): CPT

## 2022-02-14 PROCEDURE — 94640 AIRWAY INHALATION TREATMENT: CPT

## 2022-02-14 PROCEDURE — 83880 ASSAY OF NATRIURETIC PEPTIDE: CPT

## 2022-02-14 PROCEDURE — 80076 HEPATIC FUNCTION PANEL: CPT

## 2022-02-14 PROCEDURE — 94760 N-INVAS EAR/PLS OXIMETRY 1: CPT

## 2022-02-14 PROCEDURE — 80053 COMPREHEN METABOLIC PANEL: CPT

## 2022-02-14 PROCEDURE — 99497 ADVNCD CARE PLAN 30 MIN: CPT | Mod: 25,GC

## 2022-02-14 PROCEDURE — P9016: CPT

## 2022-02-14 PROCEDURE — 97163 PT EVAL HIGH COMPLEX 45 MIN: CPT | Mod: GP

## 2022-02-14 PROCEDURE — 97110 THERAPEUTIC EXERCISES: CPT | Mod: GP

## 2022-02-14 PROCEDURE — U0005: CPT

## 2022-02-14 PROCEDURE — 93306 TTE W/DOPPLER COMPLETE: CPT

## 2022-02-14 PROCEDURE — 85014 HEMATOCRIT: CPT

## 2022-02-14 PROCEDURE — 83605 ASSAY OF LACTIC ACID: CPT

## 2022-02-14 PROCEDURE — 83540 ASSAY OF IRON: CPT

## 2022-02-14 PROCEDURE — 80162 ASSAY OF DIGOXIN TOTAL: CPT

## 2022-02-14 PROCEDURE — 85018 HEMOGLOBIN: CPT

## 2022-02-14 PROCEDURE — 86901 BLOOD TYPING SEROLOGIC RH(D): CPT

## 2022-02-14 PROCEDURE — 99291 CRITICAL CARE FIRST HOUR: CPT

## 2022-02-14 PROCEDURE — 81001 URINALYSIS AUTO W/SCOPE: CPT

## 2022-02-14 PROCEDURE — 99223 1ST HOSP IP/OBS HIGH 75: CPT | Mod: GC

## 2022-02-14 PROCEDURE — U0003: CPT

## 2022-02-14 PROCEDURE — 85025 COMPLETE CBC W/AUTO DIFF WBC: CPT

## 2022-02-14 PROCEDURE — 97530 THERAPEUTIC ACTIVITIES: CPT | Mod: GP

## 2022-02-14 PROCEDURE — 86900 BLOOD TYPING SEROLOGIC ABO: CPT

## 2022-02-14 PROCEDURE — 83735 ASSAY OF MAGNESIUM: CPT

## 2022-02-14 PROCEDURE — 83550 IRON BINDING TEST: CPT

## 2022-02-14 PROCEDURE — 97116 GAIT TRAINING THERAPY: CPT | Mod: GP

## 2022-02-14 PROCEDURE — 12345: CPT | Mod: NC,GC

## 2022-02-14 PROCEDURE — 84100 ASSAY OF PHOSPHORUS: CPT

## 2022-02-14 PROCEDURE — 36415 COLL VENOUS BLD VENIPUNCTURE: CPT

## 2022-02-14 PROCEDURE — 87086 URINE CULTURE/COLONY COUNT: CPT

## 2022-02-14 PROCEDURE — 82272 OCCULT BLD FECES 1-3 TESTS: CPT

## 2022-02-14 PROCEDURE — 87040 BLOOD CULTURE FOR BACTERIA: CPT

## 2022-02-14 PROCEDURE — 86850 RBC ANTIBODY SCREEN: CPT

## 2022-02-14 PROCEDURE — 86923 COMPATIBILITY TEST ELECTRIC: CPT

## 2022-02-14 PROCEDURE — 82746 ASSAY OF FOLIC ACID SERUM: CPT

## 2022-02-14 PROCEDURE — 85027 COMPLETE CBC AUTOMATED: CPT

## 2022-02-14 PROCEDURE — 85610 PROTHROMBIN TIME: CPT

## 2022-02-14 PROCEDURE — 71250 CT THORAX DX C-: CPT

## 2022-02-14 PROCEDURE — 71045 X-RAY EXAM CHEST 1 VIEW: CPT

## 2022-02-14 PROCEDURE — 36430 TRANSFUSION BLD/BLD COMPNT: CPT

## 2022-02-14 RX ORDER — DIGOXIN 250 MCG
500 TABLET ORAL ONCE
Refills: 0 | Status: COMPLETED | OUTPATIENT
Start: 2022-02-14 | End: 2022-02-14

## 2022-02-14 RX ORDER — DIGOXIN 250 MCG
250 TABLET ORAL DAILY
Refills: 0 | Status: DISCONTINUED | OUTPATIENT
Start: 2022-02-14 | End: 2022-02-16

## 2022-02-14 RX ORDER — SODIUM CHLORIDE 9 MG/ML
1000 INJECTION, SOLUTION INTRAVENOUS
Refills: 0 | Status: DISCONTINUED | OUTPATIENT
Start: 2022-02-14 | End: 2022-02-14

## 2022-02-14 RX ORDER — NOREPINEPHRINE BITARTRATE/D5W 8 MG/250ML
0.03 PLASTIC BAG, INJECTION (ML) INTRAVENOUS
Qty: 8 | Refills: 0 | Status: DISCONTINUED | OUTPATIENT
Start: 2022-02-14 | End: 2022-02-14

## 2022-02-14 RX ORDER — SODIUM CHLORIDE 9 MG/ML
500 INJECTION INTRAMUSCULAR; INTRAVENOUS; SUBCUTANEOUS ONCE
Refills: 0 | Status: COMPLETED | OUTPATIENT
Start: 2022-02-14 | End: 2022-02-14

## 2022-02-14 RX ORDER — METOPROLOL TARTRATE 50 MG
25 TABLET ORAL DAILY
Refills: 0 | Status: DISCONTINUED | OUTPATIENT
Start: 2022-02-14 | End: 2022-02-14

## 2022-02-14 RX ORDER — CEFEPIME 1 G/1
1000 INJECTION, POWDER, FOR SOLUTION INTRAMUSCULAR; INTRAVENOUS EVERY 8 HOURS
Refills: 0 | Status: COMPLETED | OUTPATIENT
Start: 2022-02-14 | End: 2022-02-18

## 2022-02-14 RX ORDER — ATORVASTATIN CALCIUM 80 MG/1
40 TABLET, FILM COATED ORAL DAILY
Refills: 0 | Status: DISCONTINUED | OUTPATIENT
Start: 2022-02-14 | End: 2022-02-15

## 2022-02-14 RX ORDER — APIXABAN 2.5 MG/1
5 TABLET, FILM COATED ORAL
Refills: 0 | Status: DISCONTINUED | OUTPATIENT
Start: 2022-02-14 | End: 2022-02-16

## 2022-02-14 RX ORDER — FUROSEMIDE 40 MG
20 TABLET ORAL DAILY
Refills: 0 | Status: DISCONTINUED | OUTPATIENT
Start: 2022-02-14 | End: 2022-02-14

## 2022-02-14 RX ORDER — ENOXAPARIN SODIUM 100 MG/ML
40 INJECTION SUBCUTANEOUS AT BEDTIME
Refills: 0 | Status: DISCONTINUED | OUTPATIENT
Start: 2022-02-14 | End: 2022-02-14

## 2022-02-14 RX ORDER — PHENYLEPHRINE HYDROCHLORIDE 10 MG/ML
1 INJECTION INTRAVENOUS
Qty: 40 | Refills: 0 | Status: DISCONTINUED | OUTPATIENT
Start: 2022-02-14 | End: 2022-02-16

## 2022-02-14 RX ORDER — WARFARIN SODIUM 2.5 MG/1
5 TABLET ORAL ONCE
Refills: 0 | Status: DISCONTINUED | OUTPATIENT
Start: 2022-02-14 | End: 2022-02-14

## 2022-02-14 RX ORDER — ACETAMINOPHEN 500 MG
650 TABLET ORAL EVERY 6 HOURS
Refills: 0 | Status: DISCONTINUED | OUTPATIENT
Start: 2022-02-14 | End: 2022-02-25

## 2022-02-14 RX ORDER — PANTOPRAZOLE SODIUM 20 MG/1
40 TABLET, DELAYED RELEASE ORAL
Refills: 0 | Status: DISCONTINUED | OUTPATIENT
Start: 2022-02-14 | End: 2022-02-17

## 2022-02-14 RX ORDER — POTASSIUM CHLORIDE 20 MEQ
10 PACKET (EA) ORAL DAILY
Refills: 0 | Status: DISCONTINUED | OUTPATIENT
Start: 2022-02-14 | End: 2022-02-25

## 2022-02-14 RX ORDER — SODIUM CHLORIDE 9 MG/ML
1000 INJECTION, SOLUTION INTRAVENOUS
Refills: 0 | Status: DISCONTINUED | OUTPATIENT
Start: 2022-02-14 | End: 2022-02-15

## 2022-02-14 RX ORDER — CEFEPIME 1 G/1
1000 INJECTION, POWDER, FOR SOLUTION INTRAMUSCULAR; INTRAVENOUS EVERY 8 HOURS
Refills: 0 | Status: DISCONTINUED | OUTPATIENT
Start: 2022-02-14 | End: 2022-02-14

## 2022-02-14 RX ORDER — AMIODARONE HYDROCHLORIDE 400 MG/1
1 TABLET ORAL
Qty: 0 | Refills: 0 | DISCHARGE

## 2022-02-14 RX ORDER — ATORVASTATIN CALCIUM 80 MG/1
40 TABLET, FILM COATED ORAL AT BEDTIME
Refills: 0 | Status: DISCONTINUED | OUTPATIENT
Start: 2022-02-14 | End: 2022-02-25

## 2022-02-14 RX ADMIN — SODIUM CHLORIDE 500 MILLILITER(S): 9 INJECTION INTRAMUSCULAR; INTRAVENOUS; SUBCUTANEOUS at 15:15

## 2022-02-14 RX ADMIN — APIXABAN 5 MILLIGRAM(S): 2.5 TABLET, FILM COATED ORAL at 22:23

## 2022-02-14 RX ADMIN — Medication 40 MILLIGRAM(S): at 22:23

## 2022-02-14 RX ADMIN — Medication 40 MILLIGRAM(S): at 14:00

## 2022-02-14 RX ADMIN — Medication 10 MILLIEQUIVALENT(S): at 10:57

## 2022-02-14 RX ADMIN — PHENYLEPHRINE HYDROCHLORIDE 35.7 MICROGRAM(S)/KG/MIN: 10 INJECTION INTRAVENOUS at 22:23

## 2022-02-14 RX ADMIN — PHENYLEPHRINE HYDROCHLORIDE 35.7 MICROGRAM(S)/KG/MIN: 10 INJECTION INTRAVENOUS at 18:55

## 2022-02-14 RX ADMIN — CEFEPIME 1000 MILLIGRAM(S): 1 INJECTION, POWDER, FOR SOLUTION INTRAMUSCULAR; INTRAVENOUS at 14:00

## 2022-02-14 RX ADMIN — Medication 1500 MILLIGRAM(S): at 02:37

## 2022-02-14 RX ADMIN — ATORVASTATIN CALCIUM 40 MILLIGRAM(S): 80 TABLET, FILM COATED ORAL at 22:23

## 2022-02-14 RX ADMIN — Medication 500 MICROGRAM(S): at 12:20

## 2022-02-14 RX ADMIN — Medication 5.36 MICROGRAM(S)/KG/MIN: at 04:31

## 2022-02-14 RX ADMIN — PHENYLEPHRINE HYDROCHLORIDE 35.7 MICROGRAM(S)/KG/MIN: 10 INJECTION INTRAVENOUS at 11:19

## 2022-02-14 RX ADMIN — SODIUM CHLORIDE 1000 MILLILITER(S): 9 INJECTION, SOLUTION INTRAVENOUS at 02:34

## 2022-02-14 RX ADMIN — PANTOPRAZOLE SODIUM 40 MILLIGRAM(S): 20 TABLET, DELAYED RELEASE ORAL at 10:56

## 2022-02-14 RX ADMIN — Medication 300 MILLIGRAM(S): at 00:11

## 2022-02-14 RX ADMIN — SODIUM CHLORIDE 1000 MILLILITER(S): 9 INJECTION, SOLUTION INTRAVENOUS at 10:56

## 2022-02-14 RX ADMIN — CEFEPIME 1000 MILLIGRAM(S): 1 INJECTION, POWDER, FOR SOLUTION INTRAMUSCULAR; INTRAVENOUS at 22:23

## 2022-02-14 NOTE — H&P ADULT - NSHPSOCIALHISTORY_GEN_ALL_CORE
He lives with his wife, no active alcohol or tobacco use was reported. However, he used to smoke one pack of cigarettes for 20 years in the past.

## 2022-02-14 NOTE — H&P ADULT - ASSESSMENT
85 y/o male with a PMHx of polycythemia vera, prostate CA, rheumatic fever, CAD s/p CABG,  Afib s/p PPM , HTN, ? Prostate CA  presents to the ED c/o generalized weakness, and  SOB.     # Severe sepsis and acute respiratory failure 2/2 pneumonia  in Lung cancer pt   -Pt is hypotensive and tachycardic even after receiving  4 lit n/s  -ICU consult  -Zosyn iv  -lactate- 3.6  -Blood and urine culture obtained   -IV maintenance fluid   -ID consult in the morning     #?CHF  -Sob, mild lower ext edema   -BNP -5332    #Macrocytic anemia   -B12 def vs Folate def  -F/U labs     # Hyponatremia  -Probably due to  decreased po intake   - Iv N/S     #Cad, s/p CABG   -cont bb, Lasix     #Afib   -Cont metoprolol   -Pt is not taking amiodarone     #DVT prophylaxis   -Heparin     #Code status  -    Case was discussed with Dr. De Paz  85 y/o male with a PMHx of polycythemia vera, prostate CA, rheumatic fever, CAD s/p CABG,  Afib s/p PPM , HTN, ? Prostate CA  presents to the ED c/o generalized weakness, and  SOB.     # Severe sepsis and acute respiratory failure 2/2 pneumonia  in Lung cancer pt   -Pt is hypotensive and tachycardic even after receiving  4 lit n/s  -ICU consult  -Zosyn iv  -lactate- 3.6  -Blood and urine culture obtained   -IV maintenance fluid   -ID consult in the morning     #CHF  -Sob, mild lower ext edema   -BNP -5332    #Macrocytic anemia   -B12 def vs Folate def  -F/U labs     # Hyponatremia  -Probably due to  decreased po intake   - Iv N/S     #Cad, s/p CABG   -cont bb, Lasix     #Afib   -Cont metoprolol   -Pt is not taking amiodarone due to toxicity    #DVT prophylaxis   eliquis    #Code status  Full code, MOLST signed    Case was discussed with Dr. De Paz  87 y/o male with a PMHx of polycythemia vera, prostate CA, rheumatic fever, CAD s/p CABG,  Afib s/p PPM , HTN, ? Prostate CA  presents to the ED c/o generalized weakness, and  SOB.     # Severe sepsis and acute respiratory failure 2/2 pneumonia  in Lung cancer pt   -Pt is hypotensive and tachycardic even after receiving  4 lit n/s  -Pt got Vancomycin and cefepime in the ED we will continue same combination  -lactate- 3.6- rep lactate 2.5  -Blood and urine culture obtained   -IV maintenance fluid   -Icu consulted and he will be under ICU team for further management      #CHF  -Sob, mild lower ext edema   -BNP -5332    #Elevated troponin  -Probably ue to demand ischemia     #Macrocytic anemia   -B12 def vs Folate def    # Hyponatremia  -Probably due to  decreased po intake   - Iv N/S     #Cad, s/p CABG   -cont bb, Lasix     #Afib   -Cont metoprolol   -Pt is not taking amiodarone due to toxicity    #DVT prophylaxis   Eliquis    #Code status  Full code, MOLST signed    Case was discussed with Dr. De Paz

## 2022-02-14 NOTE — H&P ADULT - NSHPREVIEWOFSYSTEMS_GEN_ALL_CORE
REVIEW OF SYSTEMS:    CONSTITUTIONAL: +weakness,+ fevers or chills  EYES/ENT: No visual changes;  No vertigo or throat pain   NECK: No pain or stiffness  RESPIRATORY: No cough, wheezing, hemoptysis; +shortness of breath  CARDIOVASCULAR: No chest pain or palpitations  GASTROINTESTINAL: No abdominal or epigastric pain. No nausea, vomiting, or hematemesis; No diarrhea or constipation. No melena or hematochezia.  GENITOURINARY: No dysuria, frequency or hematuria  NEUROLOGICAL: No numbness or weakness  SKIN: No itching, rashes    Vital Signs Last 24 Hrs  T(C): 36.9 (14 Feb 2022 01:03), Max: 38.9 (13 Feb 2022 22:25)  T(F): 98.4 (14 Feb 2022 01:03), Max: 102 (13 Feb 2022 22:25)  HR: 110 (14 Feb 2022 02:20) (110 - 135)  BP: 81/61 (14 Feb 2022 02:20) (81/61 - 168/92)  BP(mean): --  RR: 24 (14 Feb 2022 02:20) (16 - 24)  SpO2: 97% (14 Feb 2022 02:20) (75% - 98%) REVIEW OF SYSTEMS:    CONSTITUTIONAL: +weakness,+ fevers or chills  EYES/ENT: No visual changes;  No vertigo or throat pain   NECK: No pain or stiffness  RESPIRATORY: No cough, wheezing, hemoptysis; +shortness of breath  CARDIOVASCULAR: No chest pain or palpitations  GASTROINTESTINAL: No abdominal or epigastric pain. No nausea, vomiting, or hematemesis; No diarrhea or constipation. No melena or hematochezia.  GENITOURINARY: No dysuria, frequency or hematuria  NEUROLOGICAL: No numbness or weakness  SKIN: No itching, rashes

## 2022-02-14 NOTE — PROGRESS NOTE ADULT - ASSESSMENT
87 y/o male with a PMHx of polycythemia vera, prostate CA, rheumatic fever, CAD s/p CABG,  Afib s/p PPM , HTN, ? Prostate CA  presents to the ED c/o generalized weakness, and  SOB.       Called Pt's PCP Dr. Anderson Orona regarding patient hx. Unsure which Chemotherapy agent patient is on and when patient stopped taking Amiodarone. Questionable contribution to current presentation. Also unsure regarding patient's main Oncologist. Please reach out to Dr. Orona PCP regarding hx her cellphone is [phone]    # Severe sepsis and acute respiratory failure 2/2 pneumonia  in Lung cancer pt   -Pt is hypotensive and tachycardic even after receiving  4 lit n/s  -Pt got Vancomycin and cefepime in the ED we will continue same combination  -lactate- 3.6- rep lactate 2.5  -Blood and urine culture obtained   -IV maintenance fluid   - Pt transition from Levophed to Phenylephrine per CCU     #CHF  -Sob, mild lower ext edema   -BNP 5332  - will monitor closely as patient is s/p 4L NS    #Elevated troponin  - Type 2 MI  -Probably 2/2 to demand ischemia     #Macrocytic anemia   -B12 def vs Folate def    # Hyponatremia  -Probably due to  decreased po intake   - Iv N/S     #Cad, s/p CABG   -cont bb, Lasix   - recent cath negative    #Afib   -Cont metoprolol   -Pt is not taking amiodarone due to toxicity    #DVT prophylaxis   Coumadine    #Code status  Full code, MOLST signed

## 2022-02-14 NOTE — H&P ADULT - ATTENDING COMMENTS
reviewed hx, ROS, exam and data w Dr. Schwartz; examined pt w him at bedside  86 year old male w P.vera, stage 4 lung CA w last chemo 3+ weeks ago, CAD, CABG, hx GIB , PPM, hx prostate CA came to ED c/o generalized weakness  Pt c/o weakness since Monday.  Continued over 3 days.  Uable to get out of chair.  Wife called 911 today  Ambulance call report reviewed.  O2 sat 78% on their arrival    VS as above  Pt appears chronically ill  Able to speak in full sentences  Neck no JVD  Chest clear bilaterally  Cor irreg irreg S1+S2  GI +BS soft, nontender  MSK no Cyanosis or edema; DJD  VASC 0-1+  NEURO alert, O x 3  PSYCH normal affect    #Acute hypoxic resp failure : multifocal infiltrates in pt treated for stage 4 lung CA  cefipime and vanco after cx  #elevated lactate 3.6  WBC 11K  s/p 3 L  #Hypotension after 3 L but pt did not appear toxic;  4th LR requested w SANJUANA sys 80s w mean in mid 60s     critical care consulted; stat procalcitonin ordered w stat lactate  #CV AFIb on AC, off amiodarone due to toxicity, beta blocker; CHF daily weight reviewed hx, ROS, exam and data w Dr. Schwartz; examined pt w him at bedside  86 year old male w P.vera, stage 4 lung CA w last chemo 3+ weeks ago, CAD, CABG, hx GIB , PPM, hx prostate CA came to ED c/o generalized weakness  Pt c/o weakness since Monday.  Continued over 3 days.  Uable to get out of chair.  Wife called 911 today  Ambulance call report reviewed.  O2 sat 78% on their arrival    VS as above  Pt appears chronically ill  Able to speak in full sentences  Neck no JVD  Chest clear bilaterally  Cor irreg irreg S1+S2  GI +BS soft, nontender  MSK no Cyanosis or edema; DJD  VASC 0-1+  NEURO alert, O x 3  PSYCH normal affect    #Acute hypoxic resp failure : multifocal infiltrates in pt treated for stage 4 lung CA  cefipime and vanco after cx  is vaccinated, RVR and SARS-CoV2 not detected  #elevated lactate 3.6  WBC 11K  s/p 3 L  #Hypotension after 3 L but pt did not appear toxic;  4th LR requested w SANJUANA sys 80s w mean in mid 60s     critical care consulted; stat procalcitonin ordered w stat lactate  #CV AFIb on AC, off amiodarone due to toxicity, beta blocker; CHF daily weight  #GOALS OF CARE  He is a full code  MOLST signed  15 min

## 2022-02-14 NOTE — PROGRESS NOTE ADULT - SUBJECTIVE AND OBJECTIVE BOX
Events Overnight: Patient admitted with weakness    HPI:  85 y/o male with a PMHx of polycythemia vera, prostate CA, , CAD s/p CABG,  Afib s/p PPM , HTN, ? Prostate CA  presents to the ED c/o generalized weakness, fever and  SOB.  been   He claims that his weakness was so severe that he could not get up from his chair or walk properly. In addition,    Pt received chemotherapy 3 To 4 weeks back for his lung cancer, and subsequent scheduled chemotherapy is in one week at [facility]  , last October, he was admitted to [facility] for GI bleeding.   He is on coumadin for afib    Review of Systems:  Review of Systems: REVIEW OF SYSTEMS:    CONSTITUTIONAL: +weakness,+ fevers or chills  EYES/ENT: No visual changes;     NECK: No pain or stiffness  RESPIRATORY:  slight cough, slight sob  CARDIOVASCULAR: No chest pain hx., afib  GASTROINTESTINAL: No abdominal or epigastric pain. No nausea, vomiting, or hematemesis; No   GENITOURINARY: No dysuria, frequency or hematuria  NEUROLOGICAL:  weakness  SKIN: No itching, rashes       MEDICATIONS  (STANDING):  atorvastatin Oral Tab/Cap - Peds 40 milliGRAM(s) Oral daily  cefepime  Injectable. 1000 milliGRAM(s) IV Push every 8 hours  lactated ringers. 1000 milliLiter(s) (100 mL/Hr) IV Continuous <Continuous>  lactated ringers. 1000 milliLiter(s) (1000 mL/Hr) IV Continuous <Continuous>  methylPREDNISolone sodium succinate Injectable 40 milliGRAM(s) IV Push every 8 hours  metoprolol succinate ER 25 milliGRAM(s) Oral daily  norepinephrine Infusion 0.03 MICROgram(s)/kG/Min (5.36 mL/Hr) IV Continuous <Continuous>  pantoprazole    Tablet 40 milliGRAM(s) Oral before breakfast  potassium chloride    Tablet ER 10 milliEquivalent(s) Oral daily  warfarin 5 milliGRAM(s) Oral once    MEDICATIONS  (PRN):      Height (cm): 186 ( @ 21:11)  Weight (kg): 95.3 ( @ 21:11)  BMI (kg/m2): 27.5 ( @ 21:11)    ICU Vital Signs Last 24 Hrs  T(C): 38.4 (2022 09:00), Max: 38.9 (2022 22:25)  T(F): 101.2 (2022 09:00), Max: 102 (2022 22:25)  HR: 94 (2022 09:00) (69 - 135)  BP: 114/90 (2022 09:00) (63/48 - 168/92)  BP(mean): 97 (2022 09:00) (61 - 97)  ABP: --  ABP(mean): --  RR: 16 (2022 09:00) (16 - 26)  SpO2: 89% (2022 09:00) (75% - 100%)    Physical Exam    General - weak, no distress  HEENT nc/at  neck no JVD  lungs - bilateral wheezing  cv Irreg, irreg  abdomen soft, non tender  ext no edema  neuro awake , alert appropriate        I&O's Summary    2022 07:01  -  2022 10:31  --------------------------------------------------------  IN: 0 mL / OUT: 325 mL / NET: -325 mL                          9.7    11.46 )-----------( 69       ( 2022 21:55 )             28.7       02-13    131<L>  |  98  |  20  ----------------------------<  139<H>  4.3   |  22  |  1.26    Ca    8.2<L>      2022 21:55    TPro  6.4  /  Alb  2.5<L>  /  TBili  2.5<H>  /  DBili  x   /  AST  40<H>  /  ALT  47  /  AlkPhos  67  02-13    Urinalysis Basic - ( 2022 23:37 )    Color: Yellow / Appearance: Clear / S.015 / pH: x  Gluc: x / Ketone: Trace  / Bili: Negative / Urobili: 4   Blood: x / Protein: See Note / Nitrite: Negative   Leuk Esterase: Trace / RBC: x / WBC x   Sq Epi: x / Non Sq Epi: x / Bacteria: x      DVT Prophylaxis:    Coumadin                                                             Advanced Directives: Full Code(cpnfirmed with Patient)

## 2022-02-14 NOTE — PROGRESS NOTE ADULT - ASSESSMENT
85 y/o male with a PMHx of polycythemia vera, prostate CA, rheumatic fever, CAD s/p CABG,  Afib s/p PPM , HTN, ? Prostate CA  presents to the ED c/o generalized weakness, and  SOB.     # septic shock and acute respiratory failure ct suggestive of pneumonia  Patient also with fever, inc wbc, and hypotension  rapid afib,   -Pt got Vancomycin and cefepime in the ED we will continue same combination   -Blood and urine culture obtained, continue cefepime   -IV maintenance fluid sl #Elevated troponin  -Probably ue to demand ischemia     #Macrocytic anemia   -B12 def vs Folate def  #Cad, s/p CABG had negativer recent cath  #Afib   - will switch to phenylephrine from levophed,  -Pt is not taking amiodarone due to toxicity  #DVT prophylaxis on coumadin at home            87 y/o male with a PMHx of polycythemia vera, prostate CA, rheumatic fever, CAD s/p CABG,  Afib s/p PPM , HTN, ? Prostate CA  presents to the ED c/o generalized weakness, and  SOB.     # septic shock and acute respiratory failure ct suggestive of pneumonia  Patient also with fever, inc wbc, and hypotension  rapid afib,   -Pt got Vancomycin and cefepime in the ED we will continue same combination   -Blood and urine culture obtained, continue cefepime   -IV maintenance fluid sl #Elevated troponin  -Probably ue to demand ischemia   - will place on solumedrol for wheezing, and was recently on steroids  #Macrocytic anemia   -B12 def vs Folate def  #Cad, s/p CABG had negativer recent cath  #Afib   - will switch to phenylephrine from levophed,  -Pt is not taking amiodarone due to toxicity  #DVT prophylaxis on coumadin at home

## 2022-02-14 NOTE — PROGRESS NOTE ADULT - SUBJECTIVE AND OBJECTIVE BOX
85 y/o male with a PMHx of polycythemia vera, prostate CA, rheumatic fever, CAD s/p CABG,  Afib s/p PPM , HTN, ? Prostate CA  presents to the ED c/o generalized weakness, and  SOB. The pt has been feeling extreme weakness and SOB associated with fever for the past four days; hence, his wife advised him to visit [facility] for further evaluation. He claims that his weakness was so severe that he could not get up from his chair or walk properly. In addition, his PO intake decreased drastically . Pt denied chest pain, nausea, vomiting, loc, history of fall, diarrhea, and [place] bleeding. Pt received chemotherapy 3 To 4 weeks back for his lung cancer, and subsequent scheduled chemotherapy is in one week. Then, last October, he was admitted to [facility] for GI bleeding.     Subjective: Patient seen and examined at bedside in CCU. Patient is acutely febrile and is shivering. He is in no respiratory distress but is on NC and saturating well.     Review of Systems  CONSTITUTIONAL: +weakness,+ fevers or chills  EYES/ENT: No visual changes;  No vertigo or throat pain   NECK: No pain or stiffness  RESPIRATORY: No cough, wheezing, hemoptysis; +shortness of breath  CARDIOVASCULAR: No chest pain or palpitations  GASTROINTESTINAL: No abdominal or epigastric pain. No nausea, vomiting, or hematemesis; No diarrhea or constipation. No melena or hematochezia.  GENITOURINARY: No dysuria, frequency or hematuria  NEUROLOGICAL: No numbness or weakness  SKIN: No itching, rashes    Physical Exam   GENERAL: NAD, Lying in the bed comfortably  HEAD:  Atraumatic, Normocephalic  EYES: EOMI, PERRLA, conjunctiva and sclera clear  ENT: Moist mucous membranes  NECK: Supple, No JVD  CHEST/LUNG: Clear to auscultation bilaterally; No rales, rhonchi, wheezing, or rubs. Unlabored respirations  HEART: Regular rate and rhythm; No murmurs, rubs, or gallops  ABDOMEN: Bowel sounds present; Soft, Nontender, Nondistended. No hepatomegaly  EXTREMITIES:  2+ Peripheral Pulses  NERVOUS SYSTEM:  Alert & Oriented X3, speech clear. No deficits   MSK: FROM all 4 extremities, full and equal strength  SKIN: Ecchymosis noted on the upper extremities bilaterally      PHYSICAL EXAM:  Vital Signs Last 24 Hrs  T(C): 37.1 (14 Feb 2022 16:01), Max: 38.9 (13 Feb 2022 22:25)  T(F): 98.8 (14 Feb 2022 16:01), Max: 102 (13 Feb 2022 22:25)  HR: 70 (14 Feb 2022 17:00) (69 - 135)  BP: 122/57 (14 Feb 2022 17:00) (63/48 - 168/92)  BP(mean): 72 (14 Feb 2022 17:00) (52 - 97)  RR: 21 (14 Feb 2022 17:00) (16 - 36)  SpO2: 94% (14 Feb 2022 17:00) (75% - 100%)      MEDICATIONS:  MEDICATIONS  (STANDING):  apixaban 5 milliGRAM(s) Oral two times a day  atorvastatin 40 milliGRAM(s) Oral at bedtime  atorvastatin Oral Tab/Cap - Peds 40 milliGRAM(s) Oral daily  cefepime  Injectable. 1000 milliGRAM(s) IV Push every 8 hours  digoxin     Tablet 250 MICROGram(s) Oral daily  lactated ringers. 1000 milliLiter(s) (1000 mL/Hr) IV Continuous <Continuous>  methylPREDNISolone sodium succinate Injectable 40 milliGRAM(s) IV Push every 8 hours  pantoprazole    Tablet 40 milliGRAM(s) Oral before breakfast  phenylephrine    Infusion 1 MICROgram(s)/kG/Min (35.7 mL/Hr) IV Continuous <Continuous>  potassium chloride    Tablet ER 10 milliEquivalent(s) Oral daily      LABS: All Labs Reviewed:                        9.7    11.46 )-----------( 69       ( 13 Feb 2022 21:55 )             28.7     02-13    131<L>  |  98  |  20  ----------------------------<  139<H>  4.3   |  22  |  1.26    Ca    8.2<L>      13 Feb 2022 21:55    TPro  6.4  /  Alb  2.5<L>  /  TBili  2.5<H>  /  DBili  x   /  AST  40<H>  /  ALT  47  /  AlkPhos  67  02-13    PT/INR - ( 13 Feb 2022 21:55 )   PT: 19.3 sec;   INR: 1.71 ratio         PTT - ( 13 Feb 2022 21:55 )  PTT:33.8 sec      Blood Culture:   I&O's Summary    14 Feb 2022 07:01  -  14 Feb 2022 18:24  --------------------------------------------------------  IN: 829 mL / OUT: 875 mL / NET: -46 mL

## 2022-02-14 NOTE — H&P ADULT - HISTORY OF PRESENT ILLNESS
85 y/o male with a PMHx of polycythemia vera, prostate CA, rheumatic fever, CAD s/p CABG,  Afib s/p PPM , HTN, ? Prostate CA  presents to the ED c/o generalized weakness, and  SOB. The pt has been feeling extreme weakness and SOB associated with fever for the past four days; hence, his wife advised him to visit [facility] for further evaluation. He claims that his weakness was so severe that he could not get up from his chair or walk properly. In addition, his PO intake decreased drastically . Pt denied chest pain, nausea, vomiting, loc, history of fall, diarrhea, and [place] bleeding.   Pt received chemotherapy 3 To 4 weeks back for his lung cancer, and subsequent scheduled chemotherapy is in one week. Then, last October, he was admitted to [facility] for GI bleeding.

## 2022-02-14 NOTE — CONSULT NOTE ADULT - SUBJECTIVE AND OBJECTIVE BOX
86y  Male  No Known Allergies    CC; Patient is a 86y old  Male who presents with a chief complaint of shortness of breath     HPI:  86 year old male with a PMHx of lung cancer ,polycythemia vera, prostate CA, rheumatic fever, CAD s/p CABG,  Afib s/p PPM , HTN,  presented to the ER c/o generalized weakness, fever and SOB for 4 days. He denied  any other associated symptoms  including chest pain, nausea, vomiting, loc, history of fall, diarrhea, and or bleeding. He was recently hospitalized here at [facility] in october for GI bleeding, recently received chemotherapy (3 To 4 weeks )) for the lung cancer, and subsequent continued chemotherapy is in one week.     Tonight in the ER his labs were significant for lactate of 3.6 WBC of 12 with fever on presentation  slight elevated trop liekly demand ischemia from respiratory distress and ProBnp of 5 thousand. Chest Ct shows infiltrates in bilateral lower lobes, respiratory distress did resolve with supplemental oxygen but while being admitted to the floor he became progressively  hypotensive and at this point post 4 liters with an already elevated PRoBnp has Bp in the 82/46 range with mean arterial prssures in the 50's.       PAST MEDICAL & SURGICAL HISTORY:  Atrial fibrillation  Hypertension  CAD (coronary artery disease)  stent in   Pacemaker  Rheumatic fever  child traylor  Prostate ca  raiation treatment 5yrs ago  Symptomatic bradycardia  Poycythemia vera  Artificial cardiac pacemaker  2006  History of PTCA  with stent to RCA     FAMILY HISTORY:  Family history of bronchiectasis (Mother)    Vital Signs Last 24 Hrs  T(C): 36.9 (2022 01:03), Max: 38.9 (2022 22:25)  T(F): 98.4 (2022 01:03), Max: 102 (2022 22:25)  HR: 72 (2022 03:16) (72 - 135)  BP: 85/49 (2022 03:16) (63/48 - 168/92)  BP(mean): --  RR: 24 (2022 02:20) (16 - 24)  SpO2: 97% (2022 02:20) (75% - 98%)    I&O's Summary  LABS    131<L>  |  98  |  20  ----------------------------<  139<H>  4.3   |  22  |  1.26  Ca    8.2<L>      2022 21:55  TPro  6.4  /  Alb  2.5<L>  /  TBili  2.5<H>  /  DBili  x   /  AST  40<H>  /  ALT  47  /  AlkPhos  67  02-13                       9.7    11.46 )-----------( 69       ( 2022 21:55 )             28.7   PT/INR - ( 2022 21:55 )   PT: 19.3 sec;   INR: 1.71 ratio     PTT - ( 2022 21:55 )  PTT:33.8 sec    LIVER FUNCTIONS - ( 2022 21:55 )  Alb: 2.5 g/dL / Pro: 6.4 gm/dL / ALK PHOS: 67 U/L / ALT: 47 U/L / AST: 40 U/L / GGT: x           CAPILLARY BLOOD GLUCOSE  Urinalysis Basic - ( 2022 23:37 )  Color: Yellow / Appearance: Clear / S.015 / pH: x  Gluc: x / Ketone: Trace  / Bili: Negative / Urobili: 4   Blood: x / Protein: See Note / Nitrite: Negative   Leuk Esterase: Trace / RBC: x / WBC x   Sq Epi: x / Non Sq Epi: x / Bacteria: x    MEDICATIONS  (STANDING):  atorvastatin Oral Tab/Cap - Peds 40 milliGRAM(s) Oral daily  furosemide    Tablet 20 milliGRAM(s) Oral daily  lactated ringers. 1000 milliLiter(s) (100 mL/Hr) IV Continuous <Continuous>  lactated ringers. 1000 milliLiter(s) (1000 mL/Hr) IV Continuous <Continuous>  metoprolol succinate ER 25 milliGRAM(s) Oral daily  norepinephrine Infusion 0.03 MICROgram(s)/kG/Min (5.36 mL/Hr) IV Continuous <Continuous>  pantoprazole    Tablet 40 milliGRAM(s) Oral before breakfast  potassium chloride    Tablet ER 10 milliEquivalent(s) Oral daily    REVIEW OF SYSTEMS:    CONSTITUTIONAL: No fever, weight loss, or fatigue  EYES: No eye pain, visual disturbances, or discharge  ENMT:  No difficulty hearing, tinnitus, vertigo; No sinus or throat pain  NECK: No pain or stiffness  BREASTS: No pain, masses, or nipple discharge  RESPIRATORY: No cough, wheezing, chills or hemoptysis; No shortness of breath  CARDIOVASCULAR: No chest pain, palpitations, dizziness, or leg swelling  GASTROINTESTINAL: No abdominal or epigastric pain. No nausea, vomiting, or hematemesis; No diarrhea or constipation. No melena or hematochezia.  GENITOURINARY: No dysuria, frequency, hematuria, or incontinence  NEUROLOGICAL: No headaches, memory loss, loss of strength, numbness, or tremors  SKIN: No itching, burning, rashes, or lesions   LYMPH NODES: No enlarged glands  ENDOCRINE: No heat or cold intolerance; No hair loss  MUSCULOSKELETAL: No joint pain or swelling; No muscle, back, or extremity pain  PSYCHIATRIC: No depression, anxiety, mood swings, or difficulty sleeping  HEME/LYMPH: No easy bruising, or bleeding gums  ALLERY AND IMMUNOLOGIC: No hives or eczema      Physicial Exam:     Constitutional: NAD, well-groomed, well-developed  HEENT: PERRLA, EOMI, no drainage or redness  Neck: No bruits; no thyromegaly or nodules,  No JVD  Back: Normal spine flexure, No CVA tenderness, No deformity or limitation of movement  Respiratory: Breath Sounds equal & clear to percussion & auscultation, no accessory muscle use  Cardiovascular: Regular rate & rhythm, normal S1, S2; no murmurs, gallops or rubs; no S3, S4  Gastrointestinal: Soft, non-tender, non distended no hepatosplenomegaly, normal bowel sounds  Extremities: No peripheral edema, No cyanosis, clubbing   Vascular: Equal and normal pulses: 2+ peripheral pulses throughout  Neurological: GCS:    A&O x 3; no sensory, motor  deficits, normal reflexes  Psychiatric: Normal mood, normal affect  Musculoskeletal: No joint pain, swelling or deformity; no limitation of movement  Skin: No rashes

## 2022-02-14 NOTE — PHARMACOTHERAPY INTERVENTION NOTE - COMMENTS
Medication history complete, called patients wife Kraig at [phone], states she does not have a list of medication and the  told her to give all of her husbands medications to EMS when they arrived, she gave them all of his meds in a bag and they were never given to the hospital- patients wife states that someone was trying to reach out to the ambulance company to locate the medication.    Med history complete only using [employer]. Medication history complete, called patients wife Kraig at [phone], states she does not have a list of medication and the  told her to give all of her husbands medications to EMS when they arrived, she gave them all of his meds in a bag and they were never given to the hospital- patients wife states that someone was trying to reach out to the ambulance company to locate the medication.    ***Patients wife called approx 11am and found the bag of meds and will bring meds to hospital with her in about an hour***

## 2022-02-14 NOTE — H&P ADULT - NSHPPHYSICALEXAM_GEN_ALL_CORE
Examination-    GENERAL: NAD, Lying in the bed comfortably  HEAD:  Atraumatic, Normocephalic  EYES: EOMI, PERRLA, conjunctiva and sclera clear  ENT: Moist mucous membranes  NECK: Supple, No JVD  CHEST/LUNG: Clear to auscultation bilaterally; No rales, rhonchi, wheezing, or rubs. Unlabored respirations  HEART: Regular rate and rhythm; No murmurs, rubs, or gallops  ABDOMEN: Bowel sounds present; Soft, Nontender, Nondistended. No hepatomegaly  EXTREMITIES:  2+ Peripheral Pulses  NERVOUS SYSTEM:  Alert & Oriented X3, speech clear. No deficits   MSK: FROM all 4 extremities, full and equal strength  SKIN: Ecchymosis noted on the upper extremities bilaterally    MEDICATIONS  (STANDING):  atorvastatin Oral Tab/Cap - Peds 40 milliGRAM(s) Oral daily  furosemide    Tablet 20 milliGRAM(s) Oral daily  lactated ringers. 1000 milliLiter(s) (1000 mL/Hr) IV Continuous <Continuous>  metoprolol succinate ER 25 milliGRAM(s) Oral daily  pantoprazole    Tablet 40 milliGRAM(s) Oral before breakfast  potassium chloride    Tablet ER 10 milliEquivalent(s) Oral daily    MEDICATIONS  (PRN): Vital Signs Last 24 Hrs  T(C): 36.9 (14 Feb 2022 01:03), Max: 38.9 (13 Feb 2022 22:25)  T(F): 98.4 (14 Feb 2022 01:03), Max: 102 (13 Feb 2022 22:25)  HR: 110 (14 Feb 2022 02:20) (110 - 135)  BP: 81/61 (14 Feb 2022 02:20) (81/61 - 168/92)  BP(mean): --  RR: 24 (14 Feb 2022 02:20) (16 - 24)  SpO2: 97% (14 Feb 2022 02:20) (75% - 98%)    GENERAL: NAD, Lying in the bed comfortably  HEAD:  Atraumatic, Normocephalic  EYES: EOMI, PERRLA, conjunctiva and sclera clear  ENT: Moist mucous membranes  NECK: Supple, No JVD  CHEST/LUNG: Clear to auscultation bilaterally; No rales, rhonchi, wheezing, or rubs. Unlabored respirations  HEART: Regular rate and rhythm; No murmurs, rubs, or gallops  ABDOMEN: Bowel sounds present; Soft, Nontender, Nondistended. No hepatomegaly  EXTREMITIES:  2+ Peripheral Pulses  NERVOUS SYSTEM:  Alert & Oriented X3, speech clear. No deficits   MSK: FROM all 4 extremities, full and equal strength  SKIN: Ecchymosis noted on the upper extremities bilaterally    MEDICATIONS  (STANDING):  atorvastatin Oral Tab/Cap - Peds 40 milliGRAM(s) Oral daily  furosemide    Tablet 20 milliGRAM(s) Oral daily  lactated ringers. 1000 milliLiter(s) (1000 mL/Hr) IV Continuous <Continuous>  metoprolol succinate ER 25 milliGRAM(s) Oral daily  pantoprazole    Tablet 40 milliGRAM(s) Oral before breakfast  potassium chloride    Tablet ER 10 milliEquivalent(s) Oral daily    MEDICATIONS  (PRN):

## 2022-02-14 NOTE — CHART NOTE - NSCHARTNOTEFT_GEN_A_CORE
A provider-focused exam of reperfusion assessment was completed after fluids. The lactate is trending downward and clinically from a respiratory perspective he remains stable on minimal supplemental oxygen, no hypoxia or SOB but the blood pressure is not improving and at this time he has been placed on IV levophed and accepted to ICU to maintain MAP greater than 65 and continued treatment for PNA with septic shock.       T(C): 36.9 (02-14-22 @ 01:03), Max: 38.9 (02-13-22 @ 22:25)  HR: 83 (02-14-22 @ 05:03) (72 - 135)  BP: 101/57 (02-14-22 @ 05:03) (63/48 - 168/92)  RR: 22 (02-14-22 @ 05:03) (16 - 24)  SpO2: 94% (02-14-22 @ 05:03) (75% - 98%)    Resp: clear to auscultation, no rales, wheezing, or rhonchi  Cardiac: S1, S2, no murmurs appreciated  Capillary refill time is less than 2 seconds.   Peripheral pulses are + 2 Radial bilaterally and + 2 DP bilaterally  Skin: normal turgor    Plan:  Continue current care.
Unable to obtain LULA ROLLINS Peters notified.

## 2022-02-14 NOTE — PROGRESS NOTE ADULT - ATTENDING COMMENTS
pleasant man with h/o PCV, distant h/o prostate ca, afib CAD, recent progression of lung ca warranting chemotx now p/w acute hypozic resp failure and sepsis most likely due to pneumonia. DDX includes amiodarone pulm toxicity and pneumonitis from chemotx. we have been calling his other docs with lack of information so far. course c/b non-MI troponin elevation from the sepsis syndrome.    plan- c/w broad spectrum abx's, de-escalate as cultures retrun, continue to track down details of recent med hx, vasopressor support as needed.

## 2022-02-15 DIAGNOSIS — I36.1 NONRHEUMATIC TRICUSPID (VALVE) INSUFFICIENCY: ICD-10-CM

## 2022-02-15 DIAGNOSIS — C34.90 MALIGNANT NEOPLASM OF UNSPECIFIED PART OF UNSPECIFIED BRONCHUS OR LUNG: ICD-10-CM

## 2022-02-15 DIAGNOSIS — I34.0 NONRHEUMATIC MITRAL (VALVE) INSUFFICIENCY: ICD-10-CM

## 2022-02-15 DIAGNOSIS — I37.1 NONRHEUMATIC PULMONARY VALVE INSUFFICIENCY: ICD-10-CM

## 2022-02-15 DIAGNOSIS — Z95.1 PRESENCE OF AORTOCORONARY BYPASS GRAFT: ICD-10-CM

## 2022-02-15 DIAGNOSIS — Z95.0 PRESENCE OF CARDIAC PACEMAKER: ICD-10-CM

## 2022-02-15 DIAGNOSIS — I25.10 ATHEROSCLEROTIC HEART DISEASE OF NATIVE CORONARY ARTERY WITHOUT ANGINA PECTORIS: ICD-10-CM

## 2022-02-15 DIAGNOSIS — I35.1 NONRHEUMATIC AORTIC (VALVE) INSUFFICIENCY: ICD-10-CM

## 2022-02-15 DIAGNOSIS — I48.20 CHRONIC ATRIAL FIBRILLATION, UNSPECIFIED: ICD-10-CM

## 2022-02-15 LAB
ALBUMIN SERPL ELPH-MCNC: 1.8 G/DL — LOW (ref 3.3–5)
ALP SERPL-CCNC: 57 U/L — SIGNIFICANT CHANGE UP (ref 40–120)
ALT FLD-CCNC: 33 U/L — SIGNIFICANT CHANGE UP (ref 12–78)
ANION GAP SERPL CALC-SCNC: 8 MMOL/L — SIGNIFICANT CHANGE UP (ref 5–17)
AST SERPL-CCNC: 35 U/L — SIGNIFICANT CHANGE UP (ref 15–37)
BILIRUB DIRECT SERPL-MCNC: 0.4 MG/DL — HIGH (ref 0–0.3)
BILIRUB INDIRECT FLD-MCNC: 0.6 MG/DL — SIGNIFICANT CHANGE UP (ref 0.2–1)
BILIRUB SERPL-MCNC: 1 MG/DL — SIGNIFICANT CHANGE UP (ref 0.2–1.2)
BUN SERPL-MCNC: 17 MG/DL — SIGNIFICANT CHANGE UP (ref 7–23)
CALCIUM SERPL-MCNC: 8 MG/DL — LOW (ref 8.5–10.1)
CHLORIDE SERPL-SCNC: 105 MMOL/L — SIGNIFICANT CHANGE UP (ref 96–108)
CO2 SERPL-SCNC: 22 MMOL/L — SIGNIFICANT CHANGE UP (ref 22–31)
CREAT SERPL-MCNC: 0.73 MG/DL — SIGNIFICANT CHANGE UP (ref 0.5–1.3)
CULTURE RESULTS: NO GROWTH — SIGNIFICANT CHANGE UP
DIGOXIN SERPL-MCNC: 0.86 NG/ML — SIGNIFICANT CHANGE UP (ref 0.8–2)
GLUCOSE SERPL-MCNC: 161 MG/DL — HIGH (ref 70–99)
HCT VFR BLD CALC: 23.9 % — LOW (ref 39–50)
HGB BLD-MCNC: 7.9 G/DL — LOW (ref 13–17)
INR BLD: 1.61 RATIO — HIGH (ref 0.88–1.16)
LACTATE SERPL-SCNC: 1.4 MMOL/L — SIGNIFICANT CHANGE UP (ref 0.7–2)
MCHC RBC-ENTMCNC: 33.1 GM/DL — SIGNIFICANT CHANGE UP (ref 32–36)
MCHC RBC-ENTMCNC: 33.8 PG — SIGNIFICANT CHANGE UP (ref 27–34)
MCV RBC AUTO: 102.1 FL — HIGH (ref 80–100)
NT-PROBNP SERPL-SCNC: 4288 PG/ML — HIGH (ref 0–450)
PLATELET # BLD AUTO: 96 K/UL — LOW (ref 150–400)
POTASSIUM SERPL-MCNC: 3.6 MMOL/L — SIGNIFICANT CHANGE UP (ref 3.5–5.3)
POTASSIUM SERPL-SCNC: 3.6 MMOL/L — SIGNIFICANT CHANGE UP (ref 3.5–5.3)
PROT SERPL-MCNC: 5.5 GM/DL — LOW (ref 6–8.3)
PROTHROM AB SERPL-ACNC: 18.3 SEC — HIGH (ref 10.6–13.6)
RBC # BLD: 2.34 M/UL — LOW (ref 4.2–5.8)
RBC # FLD: 18.2 % — HIGH (ref 10.3–14.5)
SODIUM SERPL-SCNC: 135 MMOL/L — SIGNIFICANT CHANGE UP (ref 135–145)
SPECIMEN SOURCE: SIGNIFICANT CHANGE UP
WBC # BLD: 4.49 K/UL — SIGNIFICANT CHANGE UP (ref 3.8–10.5)
WBC # FLD AUTO: 4.49 K/UL — SIGNIFICANT CHANGE UP (ref 3.8–10.5)

## 2022-02-15 PROCEDURE — 99233 SBSQ HOSP IP/OBS HIGH 50: CPT | Mod: GC

## 2022-02-15 PROCEDURE — 93306 TTE W/DOPPLER COMPLETE: CPT | Mod: 26

## 2022-02-15 PROCEDURE — 99291 CRITICAL CARE FIRST HOUR: CPT

## 2022-02-15 RX ORDER — BACLOFEN 100 %
5 POWDER (GRAM) MISCELLANEOUS ONCE
Refills: 0 | Status: COMPLETED | OUTPATIENT
Start: 2022-02-15 | End: 2022-02-15

## 2022-02-15 RX ORDER — VANCOMYCIN HCL 1 G
1250 VIAL (EA) INTRAVENOUS EVERY 12 HOURS
Refills: 0 | Status: DISCONTINUED | OUTPATIENT
Start: 2022-02-15 | End: 2022-02-15

## 2022-02-15 RX ADMIN — Medication 250 MICROGRAM(S): at 10:22

## 2022-02-15 RX ADMIN — PANTOPRAZOLE SODIUM 40 MILLIGRAM(S): 20 TABLET, DELAYED RELEASE ORAL at 07:54

## 2022-02-15 RX ADMIN — ATORVASTATIN CALCIUM 40 MILLIGRAM(S): 80 TABLET, FILM COATED ORAL at 21:49

## 2022-02-15 RX ADMIN — Medication 40 MILLIGRAM(S): at 13:15

## 2022-02-15 RX ADMIN — Medication 40 MILLIGRAM(S): at 21:49

## 2022-02-15 RX ADMIN — APIXABAN 5 MILLIGRAM(S): 2.5 TABLET, FILM COATED ORAL at 21:49

## 2022-02-15 RX ADMIN — APIXABAN 5 MILLIGRAM(S): 2.5 TABLET, FILM COATED ORAL at 10:22

## 2022-02-15 RX ADMIN — Medication 40 MILLIGRAM(S): at 05:29

## 2022-02-15 RX ADMIN — Medication 10 MILLIEQUIVALENT(S): at 10:22

## 2022-02-15 RX ADMIN — Medication 5 MILLIGRAM(S): at 23:24

## 2022-02-15 RX ADMIN — CEFEPIME 1000 MILLIGRAM(S): 1 INJECTION, POWDER, FOR SOLUTION INTRAMUSCULAR; INTRAVENOUS at 13:15

## 2022-02-15 RX ADMIN — CEFEPIME 1000 MILLIGRAM(S): 1 INJECTION, POWDER, FOR SOLUTION INTRAMUSCULAR; INTRAVENOUS at 21:50

## 2022-02-15 RX ADMIN — Medication 166.67 MILLIGRAM(S): at 05:53

## 2022-02-15 RX ADMIN — CEFEPIME 1000 MILLIGRAM(S): 1 INJECTION, POWDER, FOR SOLUTION INTRAMUSCULAR; INTRAVENOUS at 05:29

## 2022-02-15 NOTE — PROGRESS NOTE ADULT - SUBJECTIVE AND OBJECTIVE BOX
87 y/o male with a PMHx of polycythemia vera, prostate CA, rheumatic fever, CAD s/p CABG,  Afib s/p PPM , HTN, ? Prostate CA  presents to the ED c/o generalized weakness, and  SOB. Pt received chemotherapy 3 To 4 weeks back for his lung cancer, and subsequent scheduled chemotherapy is in one week.     Subjective: Patient seen and examined at bedside in CCU. Patient reports improvement, saturating well on RA. No acute complaints.     Review of Systems  CONSTITUTIONAL: no fever or chills   EYES/ENT: No visual changes;  No vertigo or throat pain   NECK: No pain or stiffness  RESPIRATORY: No cough sob, wheezing, hemoptysis  CARDIOVASCULAR: No chest pain or palpitations  GASTROINTESTINAL: No abdominal or epigastric pain. No nausea, vomiting, or hematemesis; No diarrhea or constipation. No melena or hematochezia.  GENITOURINARY: No dysuria, frequency or hematuria  NEUROLOGICAL: No numbness or weakness  SKIN: No itching, rashes    Physical Exam   GENERAL: NAD, Lying in the bed comfortably  HEAD:  Atraumatic, Normocephalic  EYES: EOMI, PERRLA, conjunctiva and sclera clear  ENT: Moist mucous membranes  NECK: Supple, No JVD  CHEST/LUNG: Crackles b/L LL. No wheezing or rubs. Unlabored respirations.   HEART: RRR.  No murmurs, rubs, or gallops  ABDOMEN: Bowel sounds present; Soft, Nontender, Nondistended. No hepatomegaly  EXTREMITIES:  2+ Peripheral Pulses  NERVOUS SYSTEM:  Alert & Oriented X3, speech clear. No deficits   MSK: FROM all 4 extremities, full and equal strength  SKIN: Ecchymosis noted on the upper extremities bilaterally      PHYSICAL EXAM:  Vital Signs Last 24 Hrs  T(C): 37.1 (14 Feb 2022 16:01), Max: 38.9 (13 Feb 2022 22:25)  T(F): 98.8 (14 Feb 2022 16:01), Max: 102 (13 Feb 2022 22:25)  HR: 70 (14 Feb 2022 17:00) (69 - 135)  BP: 122/57 (14 Feb 2022 17:00) (63/48 - 168/92)  BP(mean): 72 (14 Feb 2022 17:00) (52 - 97)  RR: 21 (14 Feb 2022 17:00) (16 - 36)  SpO2: 94% (14 Feb 2022 17:00) (75% - 100%)      MEDICATIONS:  MEDICATIONS  (STANDING):  apixaban 5 milliGRAM(s) Oral two times a day  atorvastatin 40 milliGRAM(s) Oral at bedtime  atorvastatin Oral Tab/Cap - Peds 40 milliGRAM(s) Oral daily  cefepime  Injectable. 1000 milliGRAM(s) IV Push every 8 hours  digoxin     Tablet 250 MICROGram(s) Oral daily  lactated ringers. 1000 milliLiter(s) (1000 mL/Hr) IV Continuous <Continuous>  methylPREDNISolone sodium succinate Injectable 40 milliGRAM(s) IV Push every 8 hours  pantoprazole    Tablet 40 milliGRAM(s) Oral before breakfast  phenylephrine    Infusion 1 MICROgram(s)/kG/Min (35.7 mL/Hr) IV Continuous <Continuous>  potassium chloride    Tablet ER 10 milliEquivalent(s) Oral daily      LABS: All Labs Reviewed:                        9.7    11.46 )-----------( 69       ( 13 Feb 2022 21:55 )             28.7     02-13    131<L>  |  98  |  20  ----------------------------<  139<H>  4.3   |  22  |  1.26    Ca    8.2<L>      13 Feb 2022 21:55    TPro  6.4  /  Alb  2.5<L>  /  TBili  2.5<H>  /  DBili  x   /  AST  40<H>  /  ALT  47  /  AlkPhos  67  02-13    PT/INR - ( 13 Feb 2022 21:55 )   PT: 19.3 sec;   INR: 1.71 ratio         PTT - ( 13 Feb 2022 21:55 )  PTT:33.8 sec      Blood Culture:   I&O's Summary    14 Feb 2022 07:01  -  14 Feb 2022 18:24  --------------------------------------------------------  IN: 829 mL / OUT: 875 mL / NET: -46 mL             87 y/o male with a PMHx of polycythemia vera, prostate CA, rheumatic fever, CAD s/p CABG,  Afib s/p PPM , HTN, ? Prostate CA  presents to the ED c/o generalized weakness, and  SOB. Pt received chemotherapy 3 To 4 weeks back for his lung cancer, and subsequent scheduled chemotherapy is in one week.     Subjective: Patient seen and examined at bedside in CCU. Patient reports improvement, saturating well on RA. No acute complaints.     Review of Systems  CONSTITUTIONAL: no fever or chills   EYES/ENT: No visual changes;  No vertigo or throat pain   NECK: No pain or stiffness  RESPIRATORY: No cough sob, wheezing, hemoptysis  CARDIOVASCULAR: No chest pain or palpitations  GASTROINTESTINAL: No abdominal or epigastric pain. No nausea, vomiting, or hematemesis; No diarrhea or constipation. No melena or hematochezia.  GENITOURINARY: No dysuria, frequency or hematuria  NEUROLOGICAL: No numbness or weakness  SKIN: No itching, rashes    Physical Exam   GENERAL: NAD, Lying in the bed comfortably  HEAD:  Atraumatic, Normocephalic  EYES: EOMI, PERRLA, conjunctiva and sclera clear  ENT: Moist mucous membranes  NECK: Supple, No JVD  CHEST/LUNG: Crackles b/L LL. No wheezing or rubs. Unlabored respirations.   HEART: RRR.  No murmurs, rubs, or gallops  ABDOMEN: Bowel sounds present; Soft, Nontender, Nondistended. No hepatomegaly  EXTREMITIES:  2+ Peripheral Pulses  NERVOUS SYSTEM:  Alert & Oriented X3, speech clear. No deficits   MSK: FROM all 4 extremities, full and equal strength  SKIN: Ecchymosis noted on the upper extremities bilaterally      PHYSICAL EXAM:  Vital Signs Last 24 Hrs  T(C): 37.1 (14 Feb 2022 16:01), Max: 38.9 (13 Feb 2022 22:25)  T(F): 98.8 (14 Feb 2022 16:01), Max: 102 (13 Feb 2022 22:25)  HR: 70 (14 Feb 2022 17:00) (69 - 135)  BP: 122/57 (14 Feb 2022 17:00) (63/48 - 168/92)  BP(mean): 72 (14 Feb 2022 17:00) (52 - 97)  RR: 21 (14 Feb 2022 17:00) (16 - 36)  SpO2: 94% (14 Feb 2022 17:00) (75% - 100%)      MEDICATIONS:  MEDICATIONS  (STANDING):  apixaban 5 milliGRAM(s) Oral two times a day  atorvastatin 40 milliGRAM(s) Oral at bedtime  atorvastatin Oral Tab/Cap - Peds 40 milliGRAM(s) Oral daily  cefepime  Injectable. 1000 milliGRAM(s) IV Push every 8 hours  digoxin     Tablet 250 MICROGram(s) Oral daily  lactated ringers. 1000 milliLiter(s) (1000 mL/Hr) IV Continuous <Continuous>  methylPREDNISolone sodium succinate Injectable 40 milliGRAM(s) IV Push every 8 hours  pantoprazole    Tablet 40 milliGRAM(s) Oral before breakfast  phenylephrine    Infusion 1 MICROgram(s)/kG/Min (35.7 mL/Hr) IV Continuous <Continuous>  potassium chloride    Tablet ER 10 milliEquivalent(s) Oral daily      LABS: All Labs Reviewed:                                   7.9    4.49  )-----------( 96       ( 15 Feb 2022 06:59 )             23.9     02-15    135  |  105  |  17  ----------------------------<  161<H>  3.6   |  22  |  0.73    Ca    8.0<L>      15 Feb 2022 06:59    TPro  5.5<L>  /  Alb  1.8<L>  /  TBili  1.0  /  DBili  0.4<H>  /  AST  35  /  ALT  33  /  AlkPhos  57  02-15      Ca    8.2<L>      13 Feb 2022 21:55    TPro  6.4  /  Alb  2.5<L>  /  TBili  2.5<H>  /  DBili  x   /  AST  40<H>  /  ALT  47  /  AlkPhos  67  02-13    PT/INR - ( 13 Feb 2022 21:55 )   PT: 19.3 sec;   INR: 1.71 ratio         PTT - ( 13 Feb 2022 21:55 )  PTT:33.8 sec        I&O's Summary    14 Feb 2022 07:01  -  14 Feb 2022 18:24  --------------------------------------------------------  IN: 829 mL / OUT: 875 mL / NET: -46 mL             85 y/o male with a PMHx of polycythemia vera, prostate CA, rheumatic fever, CAD s/p CABG,  Afib s/p PPM , HTN, ? Prostate CA  presents to the ED c/o generalized weakness, and  SOB. Pt received chemotherapy 3 To 4 weeks back for his lung cancer, and subsequent scheduled chemotherapy is in one week.     Subjective: Patient seen and examined at bedside in CCU. Patient reports improvement, saturating well on RA. No acute complaints.     Review of Systems  CONSTITUTIONAL: no fever or chills   EYES/ENT: No visual changes;  No vertigo or throat pain   NECK: No pain or stiffness  RESPIRATORY: No cough sob, wheezing, hemoptysis  CARDIOVASCULAR: No chest pain or palpitations  GASTROINTESTINAL: No abdominal or epigastric pain. No nausea, vomiting, or hematemesis; No diarrhea or constipation. No melena or hematochezia.  GENITOURINARY: No dysuria, frequency or hematuria  NEUROLOGICAL: No numbness or weakness  SKIN: No itching, rashes    Physical Exam   GENERAL: NAD, Lying in the bed comfortably  HEAD:  Atraumatic, Normocephalic  EYES: EOMI, PERRLA, conjunctiva and sclera clear  ENT: Moist mucous membranes  NECK: Supple, No JVD  CHEST/LUNG: Crackles b/L LL. No wheezing or rubs. Unlabored respirations.   HEART: RRR.  No murmurs, rubs, or gallops  ABDOMEN: Bowel sounds present; Soft, Nontender, Nondistended. No hepatomegaly  EXTREMITIES:  2+ Peripheral Pulses  NERVOUS SYSTEM:  Alert & Oriented X3, speech clear. No deficits   MSK: FROM all 4 extremities, full and equal strength  SKIN: Ecchymosis noted on the upper extremities bilaterally      PHYSICAL EXAM:  Vital Signs Last 24 Hrs  T(C): 37.1 (14 Feb 2022 16:01), Max: 38.9 (13 Feb 2022 22:25)  T(F): 98.8 (14 Feb 2022 16:01), Max: 102 (13 Feb 2022 22:25)  HR: 70 (14 Feb 2022 17:00) (69 - 135)  BP: 122/57 (14 Feb 2022 17:00) (63/48 - 168/92)  BP(mean): 72 (14 Feb 2022 17:00) (52 - 97)  RR: 21 (14 Feb 2022 17:00) (16 - 36)  SpO2: 94% (14 Feb 2022 17:00) (75% - 100%)      MEDICATIONS:  MEDICATIONS  (STANDING):  apixaban 5 milliGRAM(s) Oral two times a day  atorvastatin 40 milliGRAM(s) Oral at bedtime  atorvastatin Oral Tab/Cap - Peds 40 milliGRAM(s) Oral daily  cefepime  Injectable. 1000 milliGRAM(s) IV Push every 8 hours  digoxin     Tablet 250 MICROGram(s) Oral daily  lactated ringers. 1000 milliLiter(s) (1000 mL/Hr) IV Continuous <Continuous>  methylPREDNISolone sodium succinate Injectable 40 milliGRAM(s) IV Push every 8 hours  pantoprazole    Tablet 40 milliGRAM(s) Oral before breakfast  phenylephrine    Infusion 1 MICROgram(s)/kG/Min (35.7 mL/Hr) IV Continuous <Continuous>  potassium chloride    Tablet ER 10 milliEquivalent(s) Oral daily      LABS: All Labs Reviewed:                                   7.9    4.49  )-----------( 96       ( 15 Feb 2022 06:59 )             23.9     02-15    135  |  105  |  17  ----------------------------<  161<H>  3.6   |  22  |  0.73    Ca    8.0<L>      15 Feb 2022 06:59    TPro  5.5<L>  /  Alb  1.8<L>  /  TBili  1.0  /  DBili  0.4<H>  /  AST  35  /  ALT  33  /  AlkPhos  57  02-15      Ca    8.2<L>      13 Feb 2022 21:55    TPro  6.4  /  Alb  2.5<L>  /  TBili  2.5<H>  /  DBili  x   /  AST  40<H>  /  ALT  47  /  AlkPhos  67  02-13    PT/INR - ( 13 Feb 2022 21:55 )   PT: 19.3 sec;   INR: 1.71 ratio         PTT - ( 13 Feb 2022 21:55 )  PTT:33.8 sec        I&O's Summary    14 Feb 2022 07:01  -  14 Feb 2022 18:24  --------------------------------------------------------  IN: 829 mL / OUT: 875 mL / NET: -46 mL          Radiology:    < from: CT Angio Chest PE Protocol w/ IV Cont (02.13.22 @ 23:16) >  IMPRESSION:    No pulmonary embolism.    Mosaic attenuation pattern to the bilateral upper lobes which may be   related to edema or air trapping or small airways disease. Patchy   opacities in the left lower lobe and to a lesser extent in the right   lower lobe which may represent infection. Correlate clinically. Follow   these findings to resolution to exclude other processes.    < end of copied text >

## 2022-02-15 NOTE — CONSULT NOTE ADULT - SUBJECTIVE AND OBJECTIVE BOX
Patient is a 86y old  Male who presents with a chief complaint of acute hypoxic respiratory failure  PNA (15 Feb 2022 07:55)    HPI:  85 y/o male with a PMHx of polycythemia vera, prostate CA, rheumatic fever, CAD s/p CABG,  Afib s/p PPM , HTN, admitted on   c/o generalized weakness, and  SOB. The pt has been feeling extreme weakness and SOB associated with fever for the past four days; associated with inability to stand, walk. The patient was recently on chemotherapy for lung cancer and about 6 months ago had a GI bleed. Patient is poor historian and history per medical record. Is requiring small doses of pressors since admission.         PMH: as above  PSH: as above  Meds: per reconciliation sheet, noted below  MEDICATIONS  (STANDING):  apixaban 5 milliGRAM(s) Oral two times a day  atorvastatin 40 milliGRAM(s) Oral at bedtime  cefepime  Injectable. 1000 milliGRAM(s) IV Push every 8 hours  digoxin     Tablet 250 MICROGram(s) Oral daily  methylPREDNISolone sodium succinate Injectable 40 milliGRAM(s) IV Push every 8 hours  pantoprazole    Tablet 40 milliGRAM(s) Oral before breakfast  phenylephrine    Infusion 1 MICROgram(s)/kG/Min (35.7 mL/Hr) IV Continuous <Continuous>  potassium chloride    Tablet ER 10 milliEquivalent(s) Oral daily    MEDICATIONS  (PRN):  acetaminophen     Tablet .. 650 milliGRAM(s) Oral every 6 hours PRN Temp greater or equal to 38.5C (101.3F), Moderate Pain (4 - 6)    Allergies    No Known Allergies    Intolerances      Social: no smoking, no alcohol, no illegal drugs; no recent travel, no exposure to TB  FAMILY HISTORY:  Family history of bronchiectasis (Mother)       ROS unable to obtain secondary to patient medical condition     Vital Signs Last 24 Hrs  T(C): 36.1 (15 Feb 2022 08:36), Max: 37.1 (2022 16:01)  T(F): 97 (15 Feb 2022 08:36), Max: 98.8 (2022 16:01)  HR: 71 (15 Feb 2022 11:00) (70 - 128)  BP: 101/45 (15 Feb 2022 11:00) (74/50 - 131/63)  BP(mean): 58 (15 Feb 2022 11:00) (54 - 80)  RR: 16 (15 Feb 2022 11:00) (13 - 36)  SpO2: 98% (15 Feb 2022 11:00) (89% - 100%)  Daily     Daily     PE:    Constitutional: frail looking  HEENT: NC/AT, EOMI, PERRLA, conjunctivae clear; ears and nose atraumatic; pharynx clear  Neck: supple; thyroid not palpable  Back: no tenderness  Respiratory: respiratory effort normal; scattered coarse breath sounds  Cardiovascular: S1S2 regular, no murmurs  Abdomen: soft, not tender, not distended, positive BS; no liver or spleen organomegaly  Genitourinary: no suprapubic tenderness  Musculoskeletal: no muscle tenderness, no joint swelling or tenderness  Neurological/ Psychiatric:  moving all extremities  Skin: no rashes; no palpable lesions; multiple ecchymosis    Labs: all available labs reviewed                        7.9    4.49  )-----------( 96       ( 15 Feb 2022 06:59 )             23.9     02-15    135  |  105  |  17  ----------------------------<  161<H>  3.6   |  22  |  0.73    Ca    8.0<L>      15 Feb 2022 06:59    TPro  5.5<L>  /  Alb  1.8<L>  /  TBili  1.0  /  DBili  0.4<H>  /  AST  35  /  ALT  33  /  AlkPhos  57  -15     LIVER FUNCTIONS - ( 15 Feb 2022 07:08 )  Alb: 1.8 g/dL / Pro: 5.5 gm/dL / ALK PHOS: 57 U/L / ALT: 33 U/L / AST: 35 U/L / GGT: x           Urinalysis Basic - ( 2022 23:37 )    Color: Yellow / Appearance: Clear / S.015 / pH: x  Gluc: x / Ketone: Trace  / Bili: Negative / Urobili: 4   Blood: x / Protein: See Note / Nitrite: Negative   Leuk Esterase: Trace / RBC: x / WBC x   Sq Epi: x / Non Sq Epi: x / Bacteria: x    < from: CT Angio Chest PE Protocol w/ IV Cont (22 @ 23:16) >    ACC: [id number] EXAM:  CT ANGIO CHEST PULM ART [facility]                          PROCEDURE DATE:  2022          INTERPRETATION:  CLINICAL INFORMATION: Lung cancer, hypoxia, evaluate for   pulmonary embolism    COMPARISON: Chest x-ray 2022. CTabdomen pelvis 2014    CONTRAST/COMPLICATIONS:  IV Contrast: Omnipaque 350  90 cc administered   10 cc discarded  Oral Contrast: NONE  Complications: None reported at time of study completion    PROCEDURE:  CT Angiography of the Chest.  [place] coronal reformats were performed as well as 3D (MIP)   reconstructions.    FINDINGS:    LUNGS AND AIRWAYS: Patent central airways.  Right upper lobe postsurgical   changes. Mosaic attenuation pattern to the bilateral upper lobes which   may be related to edema or air trapping or small airways disease. Patchy   opacities in the left lower lobe and to a lesser extent in the right   lower lobe which may represent infection. Correlate clinically. Follow   these findings to resolution to exclude other processes.  PLEURA: Trace bilateral pleural fluid.  MEDIASTINUM AND KATELYN: No lymphadenopathy.  VESSELS: No pulmonary embolism.  HEART: Cardiomegaly. Coronary artery calcifications. No pericardial   effusion.  CHEST WALL AND LOWER NECK: Within normal limits.  VISUALIZED UPPER ABDOMEN: Within normal limits.  BONES: Sternotomy. Degenerative changes.    IMPRESSION:    No pulmonary embolism.    Mosaic attenuation pattern to the bilateral upper lobes which may be   related to edema or air trapping or small airways disease. Patchy   opacities in the left lower lobe and to a lesser extent in the right   lower lobe which may represent infection. Correlate clinically. Follow   these findings to resolution to exclude other processes.    < end of copied text >        Radiology: all available radiological tests reviewed    Advanced directives addressed: full resuscitation

## 2022-02-15 NOTE — PROGRESS NOTE ADULT - SUBJECTIVE AND OBJECTIVE BOX
Events Overnight: now in nsr, on phenylephrine 0.6 mcg/kg/min, down from max 2.5,  wbc 4.5 down from 11    HPI:   85 y/o male with a PMHx of polycythemia vera, prostate CA, , CAD s/p CABG,  Afib s/p PPM , HTN,  presents to the ED c/o generalized weakness, fever and  SOB.   He was so   weak that he could not get up from his chair or walk properly.   Pt received chemotherapy 3 To 4 weeks back for his lung cancer, and subsequent scheduled chemotherapy is in one week at [facility]  , last October, he was admitted to [facility] for GI bleeding.   He is on Eliquis for afib  Had afib with rvr yesterday, was loaded with dig, rate better. hence levop was changed to phenylephrine    Review of Systems:  Review of Systems: REVIEW OF SYSTEMS:    CONSTITUTIONAL: +weakness,+ fevers on admission better  EYES/ENT: No visual changes;     NECK: No neck pain    RESPIRATORY:  slight cough, slight sob, improved  CARDIOVASCULAR: No chest pain, hx., afib  GASTROINTESTINAL: No abdominal  pain. No nausea, vomiting,  eating but not great apetitie  GENITOURINARY: No dysuria, frequency or hematuria  NEUROLOGICAL:  weakness  SKIN: No itching, rashes    PMH:       as above    MEDICATIONS  (STANDING):  apixaban 5 milliGRAM(s) Oral two times a day  atorvastatin 40 milliGRAM(s) Oral at bedtime  cefepime  Injectable. 1000 milliGRAM(s) IV Push every 8 hours  digoxin     Tablet 250 MICROGram(s) Oral daily  methylPREDNISolone sodium succinate Injectable 40 milliGRAM(s) IV Push every 8 hours  pantoprazole    Tablet 40 milliGRAM(s) Oral before breakfast  phenylephrine    Infusion 1 MICROgram(s)/kG/Min (35.7 mL/Hr) IV Continuous <Continuous>  potassium chloride    Tablet ER 10 milliEquivalent(s) Oral daily  vancomycin  IVPB 1250 milliGRAM(s) IV Intermittent every 12 hours    MEDICATIONS  (PRN):  acetaminophen     Tablet .. 650 milliGRAM(s) Oral every 6 hours PRN Temp greater or equal to 38.5C (101.3F), Moderate Pain (4 - 6)    ICU Vital Signs Last 24 Hrs  T(C): 36.3 (2022 20:41), Max: 38.4 (2022 09:00)  T(F): 97.3 (2022 20:41), Max: 101.2 (2022 09:00)  HR: 70 (15 Feb 2022 06:00) (70 - 132)  BP: 113/48 (15 Feb 2022 06:00) (74/50 - 131/63)  BP(mean): 60 (15 Feb 2022 06:00) (52 - 97)  ABP: --  ABP(mean): --  RR: 20 (15 Feb 2022 06:00) (16 - 36)  SpO2: 95% (15 Feb 2022 06:00) (89% - 97%)      I&O's Summary    2022 07:01  -  15 Feb 2022 07:00  --------------------------------------------------------  IN: 829 mL / OUT: 1975 mL / NET: -1146 mL    Physical Exam    General - weak  no distress  neuro awake alert appropriate  HEENT nc/at  neck no jvd  lungs wheezing , improved  cv paced, HR 70,  abdomen - soft, non tender  extremitiy no edema                           7.9    4.49  )-----------( 96       ( 15 Feb 2022 06:59 )             23.9       02-15    135  |  105  |  17  ----------------------------<  161<H>  3.6   |  22  |  0.73    Ca    8.0<L>      15 Feb 2022 06:59    TPro  5.5<L>  /  Alb  1.8<L>  /  TBili  1.0  /  DBili  0.4<H>  /  AST  35  /  ALT  33  /  AlkPhos  57  02-15    Urinalysis Basic - ( 2022 23:37 )    Color: Yellow / Appearance: Clear / S.015 / pH: x  Gluc: x / Ketone: Trace  / Bili: Negative / Urobili: 4   Blood: x / Protein: See Note / Nitrite: Negative   Leuk Esterase: Trace / RBC: x / WBC x   Sq Epi: x / Non Sq Epi: x / Bacteria: x    DVT Prophylaxis:   Eliquis at home for afib                                                              Advanced Directives: Full COde

## 2022-02-15 NOTE — CONSULT NOTE ADULT - SUBJECTIVE AND OBJECTIVE BOX
CHIEF COMPLAINT:  Patient is a 86y old  Male who presents with a chief complaint of acute hypoxic respiratory failure  PNA (15 Feb 2022 16:25)      HPI: 2/15/22:  85 y/o male known to me with a PMHx of polycythemia vera, prostate CA, rheumatic fever, CAD s/p CABG,  chronic Afib s/p PPM , HTN, ? Prostate CA  presents to the ED c/o generalized weakness, and  SOB. The pt has been feeling extreme weakness and SOB associated with fever for the past four days; hence, his wife advised him to visit [facility] for further evaluation. He claims that his weakness was so severe that he could not get up from his chair or walk properly. In addition, his PO intake decreased drastically . Pt denied chest pain, nausea, vomiting, loc, history of fall, diarrhea, and OH bleeding.   Pt received chemotherapy 3 To 4 weeks back for his lung cancer, and subsequent scheduled chemotherapy is in one week. Then, last October, he was admitted to [facility] for GI bleeding.   Currently he denies anginal chest pains, palpitations, dizziness or syncope.  He had an echo today showing normal LV systolic function and LVEF approx. 55-60% with moderately dilated LA and mild MR, TR, AI and PI.  He has no new cardiac symptoms otherwise.        PMHx:  PAST MEDICAL & SURGICAL HISTORY:  Atrial fibrillation  Hypertension  CAD (coronary artery disease)-stent in 2007  CABG  Pacemaker  Rheumatic fever-child traylor  Prostate ca-radiation treatment 5yrs ago  Symptomatic bradycardia  Polycythemia vera  Artificial cardiac pacemaker-2006  History of PTCA-with stent to RCA 2007      FAMILY HISTORY:   FAMILY HISTORY:-  Family history of bronchiectasis (Mother)      ALLERGIES:  Allergies  No Known Allergies      REVIEW OF SYSTEMS:  10 point ROS was obtained  Pertinent positives and negatives are as above  All other review of systems is negative unless indicated above      Vital Signs Last 24 Hrs  T(C): 36.7 (15 Feb 2022 16:04), Max: 36.7 (15 Feb 2022 16:04)  T(F): 98.1 (15 Feb 2022 16:04), Max: 98.1 (15 Feb 2022 16:04)  HR: 79 (15 Feb 2022 19:00) (70 - 85)  BP: 114/59 (15 Feb 2022 19:00) (97/72 - 135/52)  BP(mean): 72 (15 Feb 2022 19:00) (58 - 80)  RR: 24 (15 Feb 2022 19:00) (13 - 31)  SpO2: 96% (15 Feb 2022 19:00) (94% - 100%)      I&O's Summary    14 Feb 2022 07:01  -  15 Feb 2022 07:00  --------------------------------------------------------  IN: 829 mL / OUT: 1975 mL / NET: -1146 mL    15 Feb 2022 07:01  -  15 Feb 2022 20:06  --------------------------------------------------------  IN: 60.7 mL / OUT: 0 mL / NET: 60.7 mL        PHYSICAL EXAM:   Constitutional: NAD, awake and alert, well-developed  HEENT: PERR, EOMI, Normal Hearing, MMM  Neck: Soft and supple, No LAD, No JVD  Respiratory: Breath sounds are clear bilaterally, No wheezing, rales or rhonchi, PPM in left chest wall  Cardiovascular: S1 and S2, irregular rate and irregular rhythm, soft ANA M at LLSB and base as before, no gallops or rubs  Gastrointestinal: Bowel Sounds present, soft, nontender, nondistended, no guarding, no rebound  Extremities: No peripheral edema  Vascular: 2+ peripheral pulses  Neurological: A/O x 3, no focal deficits  Musculoskeletal: 5/5 strength b/l upper and lower extremities      MEDICATIONS  (STANDING):  apixaban 5 milliGRAM(s) Oral two times a day  atorvastatin 40 milliGRAM(s) Oral at bedtime  cefepime  Injectable. 1000 milliGRAM(s) IV Push every 8 hours  digoxin     Tablet 250 MICROGram(s) Oral daily  methylPREDNISolone sodium succinate Injectable 40 milliGRAM(s) IV Push every 8 hours  pantoprazole    Tablet 40 milliGRAM(s) Oral before breakfast  phenylephrine    Infusion 1 MICROgram(s)/kG/Min (35.7 mL/Hr) IV Continuous <Continuous>  potassium chloride    Tablet ER 10 milliEquivalent(s) Oral daily    MEDICATIONS  (PRN):  acetaminophen     Tablet .. 650 milliGRAM(s) Oral every 6 hours PRN Temp greater or equal to 38.5C (101.3F), Moderate Pain (4 - 6)      LABS: All Labs Reviewed:                        7.9    4.49  )-----------( 96       ( 15 Feb 2022 06:59 )             23.9     02-15    135  |  105  |  17  ----------------------------<  161<H>  3.6   |  22  |  0.73    Ca    8.0<L>      15 Feb 2022 06:59    TPro  5.5<L>  /  Alb  1.8<L>  /  TBili  1.0  /  DBili  0.4<H>  /  AST  35  /  ALT  33  /  AlkPhos  57  02-15    PT/INR - ( 15 Feb 2022 06:59 )   PT: 18.3 sec;   INR: 1.61 ratio       PTT - ( 13 Feb 2022 21:55 )  PTT:33.8 sec    Troponin I, High Sensitivity (02.13.22 @ 21:55): 91.23    Serum Pro-Brain Natriuretic Peptide: 4288 pg/mL (02-15 @ 07:08)  Serum Pro-Brain Natriuretic Peptide: 5332 pg/mL (02-13 @ 21:55)    CULTURES:   2/13/22 & 2/14/22:  Organism --  Gram Stain Blood -- Gram Stain --  Specimen Source .Blood None  Culture-Blood --No growth to date.     2/13/22:  Organism --  Gram Stain Urine -- Gram Stain --  Specimen Source Clean Catch None  Culture-Urine --No growth       LIPID PROFILE     RADIOLOGY:    CXR: 2/13/22:  FINDINGS:  CATHETERS AND TUBES: None  PULMONARY: The visualized lungs are clear of airspace consolidations or effusions. No pneumothorax.  HEART/VASCULAR: The  heart is mildly enlarged in transverse diameter. Status postcoronary artery bypass graft procedure.Cardiac device wire leads are within right atrium and right ventricle. .  BONES: Visualized osseous structures are intact.  IMPRESSION:   No radiographic evidence of active chest disease.      CTA of Chest: 2/13/22:  FINDINGS:  LUNGS AND AIRWAYS: Patent central airways.  Right upper lobe postsurgical changes. Mosaic attenuation pattern to the bilateral upper lobes which may be related to edema or air trapping or small airways disease. Patchy opacities in the left lower lobe and to a lesser extent in the right lower lobe which may represent infection. Correlate clinically. Follow these findings to resolution to exclude other processes.  PLEURA: Trace bilateral pleural fluid.  MEDIASTINUM AND KATELYN: No lymphadenopathy.  VESSELS: No pulmonary embolism.  HEART: Cardiomegaly. Coronary artery calcifications. No pericardial effusion.  CHEST WALL AND LOWER NECK: Within normal limits.  VISUALIZED UPPER ABDOMEN: Within normal limits.  BONES: Sternotomy. Degenerative changes.  IMPRESSION:  No pulmonary embolism.  Mosaic attenuation pattern to the bilateral upper lobes which may be related to edema or air trapping or small airways disease. Patchy opacities in the left lower lobe and to a lesser extent in the right lower lobe which may represent infection. Correlate clinically. Follow these findings to resolution to exclude other processes.      EKG:      TELEMETRY:  A-V pacing with underlying AF    ECHO:  2/15/22:  M-Mode Measurements (cm):   LVEDd: 5.66 cm            LVESd: 4.04 cm   IVSEd: 0.85 cm   LVPWd: 0.97 cm            AO Root Dimension: 3.8 cm                       ACS: 2.1 cm                             LA: 5.3 cm                             LVOT: 2.2 cm  Doppler Measurements:   AV Velocity:195 cm/s                MV Peak E-Wave: 68.6 cm/s   AV Peak Gradient: 15.21 mmHg        MV Peak A-Wave: 52.3 cm/s                                       MV E/A Ratio: 1.31 %   TR Velocity:305 cm/s                MV Peak Gradient: 1.88 mmHg   TR Gradient:37.21 mmHg   Estimated RAP:5 mmHg   RVSP:42 mmHg    Findings:  Mitral Valve:   Fibrocalcific changes noted to the mitral valve leaflets with preserved leaflet excursion.   Mild mitral regurgitation is present.    Aortic Valve:   Fibrocalcific changes noted to the Aortic valve leaflets with mildly reduced mobility but does not meet criteria for aortic stenosis.   Trace to mild aortic regurgitation is present.    Tricuspid Valve:   Normal appearing tricuspid valve structure.   Mild (1+) tricuspid valve regurgitation is present.   Mild pulmonary hypertension.    Pulmonic Valve:   Normal appearing pulmonic valve structure and function.    Left Atrium:   The left atrium is moderately dilated.    Left Ventricle:   The left ventricle is normal in size, wall thickness, wall motion and contractility.   Estimated left ventricular ejection fraction is 55-60 %.    Right Atrium:   Normal appearing right atrium.    Right Ventricle:   Normal appearing right ventricle structure and function.   A device wire is seen in the RV and RA.    Pericardial Effusion:   No evidence of pericardial effusion.    Miscellaneous:   IVC was not visualized.   No subcostal window seen.    Summary:   The left ventricle is normal in size, wall thickness, wall motion and contractility.   Estimated left ventricular ejection fraction is 55-60 %.   The left atrium is moderately dilated.   Normal appearing right atrium.   Normal appearing right ventricle structure and function.   A device wire is seen in the RV and RA.   Fibrocalcific changes noted to the Aortic valve leaflets with mildly reduced mobility but does not meet criteria for aortic stenosis.   Trace to mild aortic regurgitation is present.   Normal appearing tricuspid valve structure.   Mild (1+) tricuspid valve regurgitation is present.   Mild pulmonary hypertension.   No evidence of pericardial effusion.    Signature:   ----------------------------------------------------------------   Electronically signed by Kory Ibarra MD(Interpreting physician)   on 02/15/2022 06:19 PM   ----------------------------------------------------------------

## 2022-02-15 NOTE — PROGRESS NOTE ADULT - ASSESSMENT
87 y/o male with a PMHx of polycythemia vera, prostate CA, rheumatic fever, CAD s/p CABG,  Afib s/p PPM , HTN, ? Prostate CA  presents to the ED c/o generalized weakness, and  SOB.     # septic shock and acute respiratory failure ct suggestive of pneumonia  Patient also with fever, inc wbc, and hypotension  rapid afib,   -Pt got Vancomycin and cefepime in the ED we will continue    -Blood  cultures pending   - d/c IV fluids encourage PO  - - will place on steroids for wheezing, and was recently on steroids, wheezing better today  #Macrocytic anemia   -B12 def vs Folate def  #Cad, s/p CABG had negative  recent cath  #Afib on dig, had rvr yesterday, better today, will d/c dig when bp better, level ok  - -Pt is not taking amiodarone due to toxicity  #DVT prophylaxis on Eliquis at home

## 2022-02-15 NOTE — PROGRESS NOTE ADULT - ASSESSMENT
Septic Shock  PNA  AF with RVR now RC  Lung Ca s/p chemo, immunocompromised  Elevated Troponin  Anemia, macrocytic  Thrombocytopenia  Elevated Lactate  CAD    - pressor requirement improving, benjamin down from 2.5 to 0.8, actively titrating pressors to maintain MAP > 65  - pressors switched to benjamin from levo due to AF with RVR  - lactate improved, check lactate in AM  - AF currently rate controlled, received 500 mcg dig bolus, on Po 250 mcg maintenace dose, check level with AM labs, resumed home apixaban, off amio 2/2 toxicity, hold BB while on pressors  - elevated BNP noted, recheck in AM  - elevated troponin, likely demand ischemia from PNA, septic shock, no chest pain, no ischemic changes on ECG, no need to trend  - obtain TTE  - Blood and Urine Cx collected, NGTD, UA not suggestive of UTI  - CT Chest and clinical picture suggestive of PNA  - wheezing noted 2/14, currently not wheezing on solumedrol 40q8  - macrocytic anemia, B12 levels elevated  - GI PPX: PPI  - DVT PPX SCD and apixaban    Greater than 38 minutes assessing presenting problems of acute illness, which pose high probability of life threatening deterioration or end organ damage/dysfunction, as well as medical decision making including initiating plan of care, reviewing data, reviewing radiologic exams, discussing with multidisciplinary team,  discussing goals of care with patient/family, and writing this note.  Non-inclusive of procedures performed.  Septic Shock  PNA  AF with RVR now RC  Lung Ca s/p chemo, immunocompromised  Elevated Troponin  Anemia, macrocytic  Thrombocytopenia  Elevated Lactate  CAD    - pressor requirement improving, benjamin down from 2.5 to 0.8, actively titrating pressors to maintain MAP > 65  - pressors switched to benjamin from levo due to AF with RVR  - lactate improved, check lactate in AM  - AF now paced, received 500 mcg dig bolus, on Po 250 mcg maintenace dose, check level with AM labs, resumed home apixaban, off amio 2/2 toxicity, hold BB while on pressors  - elevated BNP noted, recheck in AM  - elevated troponin, likely demand ischemia from PNA, septic shock, no chest pain, no ischemic changes on ECG, no need to trend  - obtain TTE  - Blood and Urine Cx collected, NGTD, UA not suggestive of UTI  - CT Chest and clinical picture suggestive of PNA  - wheezing noted 2/14, currently not wheezing on solumedrol 40q8  - macrocytic anemia, B12 levels elevated  - GI PPX: PPI  - DVT PPX SCD and apixaban    Greater than 38 minutes assessing presenting problems of acute illness, which pose high probability of life threatening deterioration or end organ damage/dysfunction, as well as medical decision making including initiating plan of care, reviewing data, reviewing radiologic exams, discussing with multidisciplinary team,  discussing goals of care with patient/family, and writing this note.  Non-inclusive of procedures performed.

## 2022-02-15 NOTE — PATIENT PROFILE ADULT - FALL HARM RISK - HARM RISK INTERVENTIONS

## 2022-02-15 NOTE — PROGRESS NOTE ADULT - SUBJECTIVE AND OBJECTIVE BOX
Patient is a 86y old  Male who presents with a chief complaint of acute hypoxic respiratory failure  PNA (2022 18:21)      BRIEF HOSPITAL COURSE: 86 year old male with a PMHx of lung cancer s/p recent chemo ,polycythemia vera, prostate CA, rheumatic fever, CAD s/p CABG,  Afib s/p PPM , HTN, recent admission for GIB, resumed on Eliquis at discharge  presented to the ER a/w septic shock, LLL PNA, and lactic acidosis. Received 4L LR in ED and remained hypotensive and was started on vasopressors and IV AB for PNA.  COVID negative    Events last 24 hours:   - stable on 3-4 NC, no resp distress  - afebrile  - AF with RVR    PAST MEDICAL & SURGICAL HISTORY:  Atrial fibrillation    Hypertension    CAD (coronary artery disease)  stent in     Pacemaker    Rheumatic fever  child traylor    Prostate ca  radiation treatment 5yrs ago    Symptomatic bradycardia    Polycythemia vera    Artificial cardiac pacemaker      History of PTCA  with stent to RCA           Medications:  cefepime  Injectable. 1000 milliGRAM(s) IV Push every 8 hours    digoxin     Tablet 250 MICROGram(s) Oral daily  phenylephrine    Infusion 1 MICROgram(s)/kG/Min IV Continuous <Continuous>      acetaminophen     Tablet .. 650 milliGRAM(s) Oral every 6 hours PRN      apixaban 5 milliGRAM(s) Oral two times a day    pantoprazole    Tablet 40 milliGRAM(s) Oral before breakfast      atorvastatin 40 milliGRAM(s) Oral at bedtime  atorvastatin Oral Tab/Cap - Peds 40 milliGRAM(s) Oral daily  methylPREDNISolone sodium succinate Injectable 40 milliGRAM(s) IV Push every 8 hours    lactated ringers. 1000 milliLiter(s) IV Continuous <Continuous>  potassium chloride    Tablet ER 10 milliEquivalent(s) Oral daily                ICU Vital Signs Last 24 Hrs  T(C): 36.3 (2022 20:41), Max: 38.4 (2022 09:00)  T(F): 97.3 (2022 20:41), Max: 101.2 (2022 09:00)  HR: 70 (15 Feb 2022 02:00) (69 - 132)  BP: 107/55 (15 Feb 2022 02:00) (74/50 - 131/63)  BP(mean): 67 (15 Feb 2022 02:00) (52 - 97)  ABP: --  ABP(mean): --  RR: 20 (15 Feb 2022 02:00) (16 - 36)  SpO2: 96% (15 Feb 2022 00:00) (89% - 100%)          I&O's Detail    2022 07:01  -  15 Feb 2022 04:42  --------------------------------------------------------  IN:    Lactated Ringers: 453 mL    Norepinephrine: 30 mL    Phenylephrine: 346 mL  Total IN: 829 mL    OUT:    Voided (mL): 875 mL  Total OUT: 875 mL    Total NET: -46 mL            LABS:                        9.7    11.46 )-----------( 69       ( 2022 21:55 )             28.7         131<L>  |  98  |  20  ----------------------------<  139<H>  4.3   |  22  |  1.26    Ca    8.2<L>      2022 21:55    TPro  6.4  /  Alb  2.5<L>  /  TBili  2.5<H>  /  DBili  x   /  AST  40<H>  /  ALT  47  /  AlkPhos  67            CAPILLARY BLOOD GLUCOSE        PT/INR - ( 2022 21:55 )   PT: 19.3 sec;   INR: 1.71 ratio         PTT - ( 2022 21:55 )  PTT:33.8 sec  Urinalysis Basic - ( 2022 23:37 )    Color: Yellow / Appearance: Clear / S.015 / pH: x  Gluc: x / Ketone: Trace  / Bili: Negative / Urobili: 4   Blood: x / Protein: See Note / Nitrite: Negative   Leuk Esterase: Trace / RBC: x / WBC x   Sq Epi: x / Non Sq Epi: x / Bacteria: x      CULTURES:  Rapid RVP Result: Delaney ( @ 21:53)      Physical Examination:    General: non toxic appearing     HEENT: Pupils equal, reactive to light.  Symmetric.    PULM: dimished B/L BS equal, non labored breathing pattern, protecting airway, no wheezing noted    NECK: Supple, no lymphadenopathy, trachea midline    CVS: irreg irreg    ABD: Soft, nondistended, nontender,    EXT: No edema, nontender    SKIN: Warm and well perfused, no rashes noted.    NEURO: Alert, oriented, interactive, nonfocal   Patient is a 86y old  Male who presents with a chief complaint of acute hypoxic respiratory failure  PNA (2022 18:21)      BRIEF HOSPITAL COURSE: 86 year old male with a PMHx of lung cancer s/p recent chemo ,polycythemia vera, prostate CA, rheumatic fever, CAD s/p CABG,  Afib s/p PPM , HTN, recent admission for GIB, resumed on Eliquis at discharge  presented to the ER a/w septic shock, LLL PNA, and lactic acidosis. Received 4L LR in ED and remained hypotensive and was started on vasopressors and IV AB for PNA.  COVID negative    Events last 24 hours:   - stable on 3-4 NC, no resp distress  - afebrile  - AF with RVR    PAST MEDICAL & SURGICAL HISTORY:  Atrial fibrillation    Hypertension    CAD (coronary artery disease)  stent in     Pacemaker    Rheumatic fever  child traylor    Prostate ca  radiation treatment 5yrs ago    Symptomatic bradycardia    Polycythemia vera    Artificial cardiac pacemaker      History of PTCA  with stent to RCA           Medications:  cefepime  Injectable. 1000 milliGRAM(s) IV Push every 8 hours    digoxin     Tablet 250 MICROGram(s) Oral daily  phenylephrine    Infusion 1 MICROgram(s)/kG/Min IV Continuous <Continuous>      acetaminophen     Tablet .. 650 milliGRAM(s) Oral every 6 hours PRN      apixaban 5 milliGRAM(s) Oral two times a day    pantoprazole    Tablet 40 milliGRAM(s) Oral before breakfast      atorvastatin 40 milliGRAM(s) Oral at bedtime  atorvastatin Oral Tab/Cap - Peds 40 milliGRAM(s) Oral daily  methylPREDNISolone sodium succinate Injectable 40 milliGRAM(s) IV Push every 8 hours    lactated ringers. 1000 milliLiter(s) IV Continuous <Continuous>  potassium chloride    Tablet ER 10 milliEquivalent(s) Oral daily                ICU Vital Signs Last 24 Hrs  T(C): 36.3 (2022 20:41), Max: 38.4 (2022 09:00)  T(F): 97.3 (2022 20:41), Max: 101.2 (2022 09:00)  HR: 70 (15 Feb 2022 02:00) (69 - 132)  BP: 107/55 (15 Feb 2022 02:00) (74/50 - 131/63)  BP(mean): 67 (15 Feb 2022 02:00) (52 - 97)  ABP: --  ABP(mean): --  RR: 20 (15 Feb 2022 02:00) (16 - 36)  SpO2: 96% (15 Feb 2022 00:00) (89% - 100%)          I&O's Detail    2022 07:01  -  15 Feb 2022 04:42  --------------------------------------------------------  IN:    Lactated Ringers: 453 mL    Norepinephrine: 30 mL    Phenylephrine: 346 mL  Total IN: 829 mL    OUT:    Voided (mL): 875 mL  Total OUT: 875 mL    Total NET: -46 mL            LABS:                        9.7    11.46 )-----------( 69       ( 2022 21:55 )             28.7         131<L>  |  98  |  20  ----------------------------<  139<H>  4.3   |  22  |  1.26    Ca    8.2<L>      2022 21:55    TPro  6.4  /  Alb  2.5<L>  /  TBili  2.5<H>  /  DBili  x   /  AST  40<H>  /  ALT  47  /  AlkPhos  67            CAPILLARY BLOOD GLUCOSE        PT/INR - ( 2022 21:55 )   PT: 19.3 sec;   INR: 1.71 ratio         PTT - ( 2022 21:55 )  PTT:33.8 sec  Urinalysis Basic - ( 2022 23:37 )    Color: Yellow / Appearance: Clear / S.015 / pH: x  Gluc: x / Ketone: Trace  / Bili: Negative / Urobili: 4   Blood: x / Protein: See Note / Nitrite: Negative   Leuk Esterase: Trace / RBC: x / WBC x   Sq Epi: x / Non Sq Epi: x / Bacteria: x      CULTURES:  Rapid RVP Result: Delaney ( @ 21:53)      Physical Examination:    General: non toxic appearing     HEENT: Pupils equal, reactive to light.  Symmetric.    PULM: dimished B/L BS equal, non labored breathing pattern, protecting airway, no wheezing noted    NECK: Supple, no lymphadenopathy, trachea midline    CVS: paced    ABD: Soft, nondistended, nontender,    EXT: No edema, nontender    SKIN: Warm and well perfused, no rashes noted.    NEURO: Alert, oriented, interactive, nonfocal

## 2022-02-15 NOTE — PROGRESS NOTE ADULT - ASSESSMENT
87 y/o male with a PMHx of polycythemia vera, prostate CA, rheumatic fever, CAD s/p CABG,  Afib s/p PPM , HTN, ? Prostate CA  presents to the ED c/o generalized weakness, and  SOB.       Called Pt's PCP Dr. Anderson Orona regarding patient hx. Unsure which Chemotherapy agent patient is on and when patient stopped taking Amiodarone. Questionable contribution to current presentation. Also unsure regarding patient's main Oncologist. Please reach out to Dr. Orona PCP regarding hx her cellphone is [phone]    # Severe sepsis and acute respiratory failure 2/2 pneumonia  in Lung cancer pt   -Pt is hypotensive and tachycardic even after receiving  4 lit n/s  -Pt got Vancomycin and cefepime in the ED we will continue same combination  -lactate- 3.6- rep lactate 2.5  -Blood and urine culture obtained   -IV maintenance fluid   - Pt transition from Levophed to Phenylephrine per CCU     #CHF  -Sob, mild lower ext edema   -BNP 5332  - will monitor closely as patient is s/p 4L NS    #Elevated troponin  - Type 2 MI  -Probably 2/2 to demand ischemia     #Macrocytic anemia   -B12 def vs Folate def    # Hyponatremia  -Probably due to  decreased po intake   - Iv N/S     #Cad, s/p CABG   -cont bb, Lasix   - recent cath negative    #Afib   -Cont metoprolol   -Pt is not taking amiodarone due to toxicity    #DVT prophylaxis   Coumadine    #Code status  Full code, MOLST signed     85 y/o male with a PMHx of polycythemia vera, prostate CA, rheumatic fever, CAD s/p CABG,  Afib s/p PPM , HTN, ? Prostate CA  presents to the ED c/o generalized weakness, and  SOB.     # Severe sepsis and acute respiratory failure 2/2 pneumonia  in Lung cancer pt   -requiring pressors, changed from levophed to phenylephrine by ICU team   -ID recs appreciated, cont Cefepime, hold on further vancomycin for now  -lactate- 3.6- rep lactate 2.5--> 1.4  -BCx negative prelim, UCx negative final     # Chronic CHF  -stable, no sxs of overt CHF  -BNP 5332  -TTE pending       #Elevated troponin  - Type 2 MI  -Probably 2/2 to demand ischemia     #Macrocytic anemia   -B12 mildly elevated, folate wnl     # Hyponatremia  -resolved     #Cad, s/p CABG   -cont bb, Lasix   - recent cath negative    #Afib   -RVR last night   -Dig loading started and controlled today  -Pt is not taking amiodarone due to toxicity    #DVT prophylaxis   -on eliquis     #Code status  Full code, MOLST signed     85 y/o male with a PMHx of polycythemia vera, prostate CA, rheumatic fever, CAD s/p CABG,  Afib s/p PPM , HTN, ? Prostate CA  presents to the ED c/o generalized weakness, and  SOB.     # Severe sepsis and acute respiratory failure 2/2 pneumonia in Lung Ca pt   -requiring pressors, changed from levophed to phenylephrine by ICU team   -ID recs appreciated, cont Cefepime, hold on further vancomycin for now  -lactate- 3.6- rep lactate 2.5--> 1.4  -BCx negative prelim, UCx negative final     # Chronic CHF  -stable, no sxs of overt CHF  -BNP 5332  -TTE pending       #Elevated troponin  - Type 2 MI  -Probably 2/2 to demand ischemia     #Macrocytic anemia   -B12 mildly elevated, folate wnl     # Hyponatremia  -resolved     #Cad, s/p CABG   -cont bb, Lasix   - recent cath negative    #Chronic Afib   -RVR last night   -Dig loading started and controlled today  -Pt is not taking amiodarone due to toxicity  -on eliquis 5 mg q12    #DVT prophylaxis   -on eliquis     #Code status  Full code, MOLST signed    d/w Dr Naqvi

## 2022-02-15 NOTE — PROGRESS NOTE ADULT - ATTENDING COMMENTS
less dyspneic today.  on exam improved aeration UL's, crackles LL's    imp- Pna-> sepsis.  weaning off vasopressors.  trying pcp and his pther docs again to clarify the amio and chemotx concerns

## 2022-02-16 LAB
ANION GAP SERPL CALC-SCNC: 8 MMOL/L — SIGNIFICANT CHANGE UP (ref 5–17)
BUN SERPL-MCNC: 23 MG/DL — SIGNIFICANT CHANGE UP (ref 7–23)
CALCIUM SERPL-MCNC: 8.2 MG/DL — LOW (ref 8.5–10.1)
CHLORIDE SERPL-SCNC: 104 MMOL/L — SIGNIFICANT CHANGE UP (ref 96–108)
CO2 SERPL-SCNC: 23 MMOL/L — SIGNIFICANT CHANGE UP (ref 22–31)
CREAT SERPL-MCNC: 0.88 MG/DL — SIGNIFICANT CHANGE UP (ref 0.5–1.3)
GLUCOSE SERPL-MCNC: 167 MG/DL — HIGH (ref 70–99)
HCT VFR BLD CALC: 21.6 % — LOW (ref 39–50)
HCT VFR BLD CALC: 24.8 % — LOW (ref 39–50)
HGB BLD-MCNC: 7.1 G/DL — LOW (ref 13–17)
HGB BLD-MCNC: 8 G/DL — LOW (ref 13–17)
MCHC RBC-ENTMCNC: 32.3 GM/DL — SIGNIFICANT CHANGE UP (ref 32–36)
MCHC RBC-ENTMCNC: 32.9 GM/DL — SIGNIFICANT CHANGE UP (ref 32–36)
MCHC RBC-ENTMCNC: 33.8 PG — SIGNIFICANT CHANGE UP (ref 27–34)
MCHC RBC-ENTMCNC: 34 PG — SIGNIFICANT CHANGE UP (ref 27–34)
MCV RBC AUTO: 103.3 FL — HIGH (ref 80–100)
MCV RBC AUTO: 104.6 FL — HIGH (ref 80–100)
OB PNL STL: NEGATIVE — SIGNIFICANT CHANGE UP
PLATELET # BLD AUTO: 117 K/UL — LOW (ref 150–400)
PLATELET # BLD AUTO: 143 K/UL — LOW (ref 150–400)
POTASSIUM SERPL-MCNC: 3.8 MMOL/L — SIGNIFICANT CHANGE UP (ref 3.5–5.3)
POTASSIUM SERPL-SCNC: 3.8 MMOL/L — SIGNIFICANT CHANGE UP (ref 3.5–5.3)
RBC # BLD: 2.09 M/UL — LOW (ref 4.2–5.8)
RBC # BLD: 2.37 M/UL — LOW (ref 4.2–5.8)
RBC # FLD: 18.1 % — HIGH (ref 10.3–14.5)
RBC # FLD: 18.2 % — HIGH (ref 10.3–14.5)
SODIUM SERPL-SCNC: 135 MMOL/L — SIGNIFICANT CHANGE UP (ref 135–145)
WBC # BLD: 5.33 K/UL — SIGNIFICANT CHANGE UP (ref 3.8–10.5)
WBC # BLD: 7.7 K/UL — SIGNIFICANT CHANGE UP (ref 3.8–10.5)
WBC # FLD AUTO: 5.33 K/UL — SIGNIFICANT CHANGE UP (ref 3.8–10.5)
WBC # FLD AUTO: 7.7 K/UL — SIGNIFICANT CHANGE UP (ref 3.8–10.5)

## 2022-02-16 PROCEDURE — 99233 SBSQ HOSP IP/OBS HIGH 50: CPT

## 2022-02-16 PROCEDURE — 99233 SBSQ HOSP IP/OBS HIGH 50: CPT | Mod: GC

## 2022-02-16 PROCEDURE — 99497 ADVNCD CARE PLAN 30 MIN: CPT

## 2022-02-16 RX ORDER — DIGOXIN 250 MCG
250 TABLET ORAL DAILY
Refills: 0 | Status: DISCONTINUED | OUTPATIENT
Start: 2022-02-16 | End: 2022-02-25

## 2022-02-16 RX ORDER — METOPROLOL TARTRATE 50 MG
25 TABLET ORAL
Refills: 0 | Status: DISCONTINUED | OUTPATIENT
Start: 2022-02-16 | End: 2022-02-25

## 2022-02-16 RX ADMIN — ATORVASTATIN CALCIUM 40 MILLIGRAM(S): 80 TABLET, FILM COATED ORAL at 21:31

## 2022-02-16 RX ADMIN — APIXABAN 5 MILLIGRAM(S): 2.5 TABLET, FILM COATED ORAL at 10:16

## 2022-02-16 RX ADMIN — Medication 10 MILLIEQUIVALENT(S): at 10:17

## 2022-02-16 RX ADMIN — Medication 40 MILLIGRAM(S): at 06:04

## 2022-02-16 RX ADMIN — PANTOPRAZOLE SODIUM 40 MILLIGRAM(S): 20 TABLET, DELAYED RELEASE ORAL at 10:16

## 2022-02-16 RX ADMIN — CEFEPIME 1000 MILLIGRAM(S): 1 INJECTION, POWDER, FOR SOLUTION INTRAMUSCULAR; INTRAVENOUS at 21:32

## 2022-02-16 RX ADMIN — CEFEPIME 1000 MILLIGRAM(S): 1 INJECTION, POWDER, FOR SOLUTION INTRAMUSCULAR; INTRAVENOUS at 06:05

## 2022-02-16 RX ADMIN — CEFEPIME 1000 MILLIGRAM(S): 1 INJECTION, POWDER, FOR SOLUTION INTRAMUSCULAR; INTRAVENOUS at 13:23

## 2022-02-16 RX ADMIN — Medication 250 MICROGRAM(S): at 10:17

## 2022-02-16 NOTE — PROGRESS NOTE ADULT - ASSESSMENT
87 y/o male with a PMHx of polycythemia vera, prostate CA, rheumatic fever, CAD s/p CABG,  Afib s/p PPM , HTN, ? Prostate CA  presents to the ED c/o generalized weakness, and  SOB.     # septic shock and acute respiratory failure ct suggestive of pneumonia  Patient also with fever, inc wbc, and hypotension  rapid afib,   -  On cefepime day 3,    -Blood  cultures negative  - Got a few doses of stroids, will d/c wheezing improved bp better  -#Cad, s/p CABG had negative  recent cath  #Afib d/c dig, rate better nor off pressors, will resume home bblocker dose  - -Pt is not taking amiodarone due to toxicity  #DVT prophylaxis on Eliquis at home   can transfer to tele   87 y/o male with a PMHx of polycythemia vera, prostate CA, rheumatic fever, CAD s/p CABG,  Afib s/p PPM , HTN, ? Prostate CA  presents to the ED c/o generalized weakness, and  SOB.     # septic shock and acute respiratory failure ct suggestive of pneumonia  Patient also with fever, inc wbc, and hypotension  rapid afib,   -  On cefepime day 3,    -Blood  cultures negative  - Got a few doses of stroids, will d/c wheezing improved bp better  -#Cad, s/p CABG had negative  recent cath  #Afib  dig, rate better nor off pressors, will resume home bblocker dose, echo normal ef  - -Pt is not taking amiodarone due to toxicity  #DVT prophylaxis on Eliquis at home    dec hgb, will monitor no evidence of bleeding  can transfer to tele   85 y/o male with a PMHx of polycythemia vera, prostate CA, rheumatic fever, CAD s/p CABG,  Afib s/p PPM , HTN, ? Prostate CA  presents to the ED c/o generalized weakness, and  SOB.     # septic shock and acute respiratory failure ct suggestive of pneumonia  Patient also with fever, inc wbc, and hypotension  rapid afib,   -  On cefepime day 3,    -Blood  cultures negative  - Got a few doses of stroids, will d/c wheezing improved bp better  -#Cad, s/p CABG had negative  recent cath  #Afib  dig, rate better nor off pressors, will resume home bblocker dose, echo normal ef  - -Pt is not taking amiodarone due to toxicity  #DVT prophylaxis on Eliquis at home    dec hgb, will monitor no evidence of bleeding  can transfer to tele    addendum: Patient with dec hgb had bloody bowel movement this am, ? melena, will chec h/h hold eliquis 87 y/o male with a PMHx of polycythemia vera, prostate CA, rheumatic fever, CAD s/p CABG,  Afib s/p PPM , HTN, ? Prostate CA  presents to the ED c/o generalized weakness, and  SOB.     # septic shock and acute respiratory failure ct suggestive of pneumonia  Patient also with fever, inc wbc, and hypotension  rapid afib,   -  On cefepime day 3,    -Blood  cultures negative  - Got a few doses of stroids, will d/c wheezing improved bp better  -#Cad, s/p CABG had negative  recent cath  #Afib  dig, rate better nor off pressors, will resume home bblocker dose, echo normal ef  - -Pt is not taking amiodarone due to toxicity  #DVT prophylaxis on Eliquis at home    dec hgb,  repeat hgb 8, ? slightly dark bm, will hold eliquis for now, GI consult if further bleeding  can transfer to tele    addendum: Patient with dec hgb had bloody bowel movement this am, ? melena, will chec h/h hold eliquis 85 y/o male with a PMHx of polycythemia vera, prostate CA, rheumatic fever, CAD s/p CABG,  Afib s/p PPM , HTN, ? Prostate CA  presents to the ED c/o generalized weakness, and  SOB.     # septic shock and acute respiratory failure ct suggestive of pneumonia  Patient also with fever, inc wbc, and hypotension  rapid afib,   -  On cefepime day 3,    -Blood  cultures negative  - Got a few doses of stroids, will d/c wheezing improved bp better  -#Cad, s/p CABG had negative  recent cath  #Afib  dig, rate better nor off pressors, will resume home bblocker dose, echo normal ef  - -Pt is not taking amiodarone due to toxicity  #DVT prophylaxis on Eliquis at home    dec hgb,  repeat hgb 8, ? slightly dark bm, will hold eliquis until clear no bleeding, if not would resume, , GI consult if further bleeding, d/w patient  can transfer to tele    addendum: Patient with dec hgb had bloody bowel movement this am, ? melena, will chec h/h hold eliquis

## 2022-02-16 NOTE — PROGRESS NOTE ADULT - ASSESSMENT
2/15/22: Pt with above history and lung CA on chemoRx with SOB but likely not of cardiac origin.  Being treated for PNA in the face of underlying lung CA but clinically appears better.  His AF is rate controlled with an underlying PPM for bradycardia protection.  I would continue his current meds including the Digoxin as it does not appear to be near toxic range.  Continue as outlined by medicine and ID etal.  Will follow as work-up and treatment progresses.    2/16/22:  Less SOB.  No anginal chest pains or other cardiac changes.  A-V pacing on the monitor.  Continue as outlined by medicine and ID etal.  Will follow as work-up and treatment progresses.

## 2022-02-16 NOTE — PROGRESS NOTE ADULT - ASSESSMENT
Septic Shock  PNA  AF with RVR now RC  Lung Ca s/p chemo, immunocompromised  Elevated Troponin  Anemia, macrocytic  Thrombocytopenia  Elevated Lactate  CAD    - septic shock resolved, off pressors, low threshold to reinitiate pressors to maintain MAP > 65  - lactate improved to 1.4  - AF now paced, received 500 mcg dig bolus, on Po 250 mcg maintenace dose, dig level 0.86, resumed home apixaban, off amio 2/2 toxicity, , would hold BB overnight  - elevated BNP on admission after IVF resuscitation now trending down  - elevated troponin, likely demand ischemia from PNA, septic shock, no chest pain, no ischemic changes on ECG, no need to trend  - echo today showing normal LV systolic function and LVEF approx. 55-60% with moderately dilated LA and mild MR, TR, AI and PI  - Blood and Urine Cx collected, NGTD, UA not suggestive of UTI  - CT Chest and clinical picture suggestive of PNA  - wheezing noted 2/14, currently not wheezing on solumedrol 40q8  - macrocytic anemia, B12 levels elevated, Hg downtrending w/o overt clinical signs of bleeding, check CBC in AM  - Cr downtrending, making adequate urine  - GI PPX: PPI  - DVT PPX SCD and apixaban  - Reg Diet  - ICU

## 2022-02-16 NOTE — PHYSICAL THERAPY INITIAL EVALUATION ADULT - GENERAL OBSERVATIONS, REHAB EVAL
HM; BP cuff; pulse oxym; pt rec'd in bed supine; HR 70; /48; O2 sat 100%; RR 18; pt denied pain; spouse present

## 2022-02-16 NOTE — PROGRESS NOTE ADULT - SUBJECTIVE AND OBJECTIVE BOX
Date of service: 02-16-22 @ 12:32      Patient sitting in chair; is in good spirits      ROS: no fever or chills; denies dizziness, no HA, no SOB or cough, no abdominal pain, no diarrhea or constipation; no dysuria, no urinary frequency, no legs pain, no rashes    MEDICATIONS  (STANDING):  atorvastatin 40 milliGRAM(s) Oral at bedtime  cefepime  Injectable. 1000 milliGRAM(s) IV Push every 8 hours  digoxin     Tablet 250 MICROGram(s) Oral daily  metoprolol tartrate 25 milliGRAM(s) Oral two times a day  pantoprazole    Tablet 40 milliGRAM(s) Oral before breakfast  potassium chloride    Tablet ER 10 milliEquivalent(s) Oral daily    MEDICATIONS  (PRN):  acetaminophen     Tablet .. 650 milliGRAM(s) Oral every 6 hours PRN Temp greater or equal to 38.5C (101.3F), Moderate Pain (4 - 6)      Vital Signs Last 24 Hrs  T(C): 35.9 (15 Feb 2022 23:52), Max: 36.7 (15 Feb 2022 16:04)  T(F): 96.6 (15 Feb 2022 23:52), Max: 98.1 (15 Feb 2022 16:04)  HR: 70 (16 Feb 2022 12:00) (69 - 85)  BP: 110/53 (16 Feb 2022 12:00) (97/51 - 137/99)  BP(mean): 66 (16 Feb 2022 12:00) (58 - 109)  RR: 20 (16 Feb 2022 12:00) (15 - 31)  SpO2: 96% (16 Feb 2022 12:00) (89% - 100%)        Physical Exam:          Constitutional: frail looking  HEENT: NC/AT, EOMI, PERRLA, conjunctivae clear; ears and nose atraumatic; pharynx clear  Neck: supple; thyroid not palpable  Back: no tenderness  Respiratory: respiratory effort normal; scattered coarse breath sounds, improving  Cardiovascular: S1S2 regular, no murmurs  Abdomen: soft, not tender, not distended, positive BS; no liver or spleen organomegaly  Genitourinary: no suprapubic tenderness  Musculoskeletal: no muscle tenderness, no joint swelling or tenderness  Neurological/ Psychiatric:  moving all extremities  Skin: no rashes; no palpable lesions; multiple ecchymosis    Labs: all available labs reviewed                           Labs:                        8.0    7.70  )-----------( 143      ( 16 Feb 2022 12:15 )             24.8     02-16    135  |  104  |  23  ----------------------------<  167<H>  3.8   |  23  |  0.88    Ca    8.2<L>      16 Feb 2022 06:51    TPro  5.5<L>  /  Alb  1.8<L>  /  TBili  1.0  /  DBili  0.4<H>  /  AST  35  /  ALT  33  /  AlkPhos  57  02-15           Cultures:       Culture - Blood (collected 02-14-22 @ 06:52)  Source: .Blood None  Preliminary Report (02-15-22 @ 13:02):    No growth to date.    Culture - Blood (collected 02-13-22 @ 23:37)  Source: .Blood None  Preliminary Report (02-15-22 @ 09:01):    No growth to date.    Culture - Urine (collected 02-13-22 @ 23:37)  Source: Clean Catch None  Final Report (02-15-22 @ 07:51):    No growth                < from: CT Angio Chest PE Protocol w/ IV Cont (02.13.22 @ 23:16) >    ACC: [id number] EXAM:  CT ANGIO CHEST PULM [facility]                          PROCEDURE DATE:  02/13/2022          INTERPRETATION:  CLINICAL INFORMATION: Lung cancer, hypoxia, evaluate for   pulmonary embolism    COMPARISON: Chest x-ray 2/13/2022. CTabdomen pelvis 9/12/2014    CONTRAST/COMPLICATIONS:  IV Contrast: Omnipaque 350  90 cc administered   10 cc discarded  Oral Contrast: NONE  Complications: None reported at time of study completion    PROCEDURE:  CT Angiography of the Chest.  [place] coronal reformats were performed as well as 3D (MIP)   reconstructions.    FINDINGS:    LUNGS AND AIRWAYS: Patent central airways.  Right upper lobe postsurgical   changes. Mosaic attenuation pattern to the bilateral upper lobes which   may be related to edema or air trapping or small airways disease. Patchy   opacities in the left lower lobe and to a lesser extent in the right   lower lobe which may represent infection. Correlate clinically. Follow   these findings to resolution to exclude other processes.  PLEURA: Trace bilateral pleural fluid.  MEDIASTINUM AND KATELYN: No lymphadenopathy.  VESSELS: No pulmonary embolism.  HEART: Cardiomegaly. Coronary artery calcifications. No pericardial   effusion.  CHEST WALL AND LOWER NECK: Within normal limits.  VISUALIZED UPPER ABDOMEN: Within normal limits.  BONES: Sternotomy. Degenerative changes.    IMPRESSION:    No pulmonary embolism.    Mosaic attenuation pattern to the bilateral upper lobes which may be   related to edema or air trapping or small airways disease. Patchy   opacities in the left lower lobe and to a lesser extent in the right   lower lobe which may represent infection. Correlate clinically. Follow   these findings to resolution to exclude other processes.    < end of copied text >        Radiology: all available radiological tests reviewed    Advanced directives addressed: full resuscitation

## 2022-02-16 NOTE — PHYSICAL THERAPY INITIAL EVALUATION ADULT - MODALITIES TREATMENT COMMENTS
pt left in bed supine post Eval @ pt request; bed alarm on; all above lines / monitors in place; spouse present; callbell in reach; pt instructed not to get up alone; call nursing for assist; mahesh well; denied pain

## 2022-02-16 NOTE — PROGRESS NOTE ADULT - SUBJECTIVE AND OBJECTIVE BOX
CHIEF COMPLAINT:  Patient is a 86y old  Male who presents with a chief complaint of acute hypoxic respiratory failure  PNA (15 Feb 2022 16:25)      HPI: 2/15/22:  85 y/o male known to me with a PMHx of polycythemia vera, prostate CA, rheumatic fever, CAD s/p CABG,  chronic Afib s/p PPM , HTN, ? Prostate CA  presents to the ED c/o generalized weakness, and  SOB. The pt has been feeling extreme weakness and SOB associated with fever for the past four days; hence, his wife advised him to visit [facility] for further evaluation. He claims that his weakness was so severe that he could not get up from his chair or walk properly. In addition, his PO intake decreased drastically . Pt denied chest pain, nausea, vomiting, loc, history of fall, diarrhea, and AL bleeding.   Pt received chemotherapy 3 To 4 weeks back for his lung cancer, and subsequent scheduled chemotherapy is in one week. Then, last October, he was admitted to [facility] for GI bleeding.   Currently he denies anginal chest pains, palpitations, dizziness or syncope.  He had an echo today showing normal LV systolic function and LVEF approx. 55-60% with moderately dilated LA and mild MR, TR, AI and PI.  He has no new cardiac symptoms otherwise.    2/16/22:  Less SOB.  No anginal chest pains or other cardiac changes.  A-V pacing on the monitor.        PMHx:  PAST MEDICAL & SURGICAL HISTORY:  Atrial fibrillation  Hypertension  CAD (coronary artery disease)-stent in 2007  CABG  Pacemaker  Rheumatic fever-child traylor  Prostate ca-radiation treatment 5yrs ago  Symptomatic bradycardia  Polycythemia vera  Artificial cardiac pacemaker-2006  History of PTCA-with stent to RCA 2007      FAMILY HISTORY:   FAMILY HISTORY:-  Family history of bronchiectasis (Mother)      ALLERGIES:  Allergies  No Known Allergies      REVIEW OF SYSTEMS:  10 point ROS was obtained  Pertinent positives and negatives are as above  All other review of systems is negative unless indicated above      ICU Vital Signs Last 24 Hrs  T(C): 35.9 (15 Feb 2022 23:52), Max: 36.7 (15 Feb 2022 16:04)  T(F): 96.6 (15 Feb 2022 23:52), Max: 98.1 (15 Feb 2022 16:04)  HR: 70 (16 Feb 2022 06:00) (69 - 85)  BP: 102/48 (16 Feb 2022 06:00) (97/51 - 135/52)  BP(mean): 60 (16 Feb 2022 06:00) (58 - 96)  RR: 18 (16 Feb 2022 06:00) (13 - 31)  SpO2: 89% (16 Feb 2022 06:00) (89% - 100%)      I&O's Summary    14 Feb 2022 07:01  -  15 Feb 2022 07:00  --------------------------------------------------------  IN: 829 mL / OUT: 1975 mL / NET: -1146 mL    15 Feb 2022 07:01  -  15 Feb 2022 20:06  --------------------------------------------------------  IN: 60.7 mL / OUT: 0 mL / NET: 60.7 mL        PHYSICAL EXAM:   Constitutional: NAD, awake and alert, well-developed  HEENT: PERR, EOMI, Normal Hearing, MMM  Neck: Soft and supple, No LAD, No JVD  Respiratory: Breath sounds are clear bilaterally, No wheezing, rales or rhonchi, PPM in left chest wall  Cardiovascular: S1 and S2, irregular rate and irregular rhythm, soft ANA M at LLSB and base as before, no gallops or rubs  Gastrointestinal: Bowel Sounds present, soft, nontender, nondistended, no guarding, no rebound  Extremities: No peripheral edema  Vascular: 2+ peripheral pulses  Neurological: A/O x 3, no focal deficits  Musculoskeletal: 5/5 strength b/l upper and lower extremities      MEDICATIONS  (STANDING):  apixaban 5 milliGRAM(s) Oral two times a day  atorvastatin 40 milliGRAM(s) Oral at bedtime  cefepime  Injectable. 1000 milliGRAM(s) IV Push every 8 hours  digoxin     Tablet 250 MICROGram(s) Oral daily  methylPREDNISolone sodium succinate Injectable 40 milliGRAM(s) IV Push every 8 hours  pantoprazole    Tablet 40 milliGRAM(s) Oral before breakfast  phenylephrine    Infusion 1 MICROgram(s)/kG/Min (35.7 mL/Hr) IV Continuous <Continuous>  potassium chloride    Tablet ER 10 milliEquivalent(s) Oral daily    MEDICATIONS  (PRN):  acetaminophen     Tablet .. 650 milliGRAM(s) Oral every 6 hours PRN Temp greater or equal to 38.5C (101.3F), Moderate Pain (4 - 6)      LABS: All Labs Reviewed:                        7.9    4.49  )-----------( 96       ( 15 Feb 2022 06:59 )             23.9     02-15    135  |  105  |  17  ----------------------------<  161<H>  3.6   |  22  |  0.73    Ca    8.0<L>      15 Feb 2022 06:59    TPro  5.5<L>  /  Alb  1.8<L>  /  TBili  1.0  /  DBili  0.4<H>  /  AST  35  /  ALT  33  /  AlkPhos  57  02-15    PT/INR - ( 15 Feb 2022 06:59 )   PT: 18.3 sec;   INR: 1.61 ratio       PTT - ( 13 Feb 2022 21:55 )  PTT:33.8 sec    Troponin I, High Sensitivity (02.13.22 @ 21:55): 91.23    Serum Pro-Brain Natriuretic Peptide: 4288 pg/mL (02-15 @ 07:08)  Serum Pro-Brain Natriuretic Peptide: 5332 pg/mL (02-13 @ 21:55)    Digoxin Level, Serum (02.15.22 @ 06:59): .86 ng/mL     CULTURES:   2/13/22 & 2/14/22:  Organism --  Gram Stain Blood -- Gram Stain --  Specimen Source .Blood None  Culture-Blood --No growth to date.     2/13/22:  Organism --  Gram Stain Urine -- Gram Stain --  Specimen Source Clean Catch None  Culture-Urine --No growth       LIPID PROFILE     RADIOLOGY:    CXR: 2/13/22:  FINDINGS:  CATHETERS AND TUBES: None  PULMONARY: The visualized lungs are clear of airspace consolidations or effusions. No pneumothorax.  HEART/VASCULAR: The  heart is mildly enlarged in transverse diameter. Status postcoronary artery bypass graft procedure.Cardiac device wire leads are within right atrium and right ventricle. .  BONES: Visualized osseous structures are intact.  IMPRESSION:   No radiographic evidence of active chest disease.      CTA of Chest: 2/13/22:  FINDINGS:  LUNGS AND AIRWAYS: Patent central airways.  Right upper lobe postsurgical changes. Mosaic attenuation pattern to the bilateral upper lobes which may be related to edema or air trapping or small airways disease. Patchy opacities in the left lower lobe and to a lesser extent in the right lower lobe which may represent infection. Correlate clinically. Follow these findings to resolution to exclude other processes.  PLEURA: Trace bilateral pleural fluid.  MEDIASTINUM AND KATELYN: No lymphadenopathy.  VESSELS: No pulmonary embolism.  HEART: Cardiomegaly. Coronary artery calcifications. No pericardial effusion.  CHEST WALL AND LOWER NECK: Within normal limits.  VISUALIZED UPPER ABDOMEN: Within normal limits.  BONES: Sternotomy. Degenerative changes.  IMPRESSION:  No pulmonary embolism.  Mosaic attenuation pattern to the bilateral upper lobes which may be related to edema or air trapping or small airways disease. Patchy opacities in the left lower lobe and to a lesser extent in the right lower lobe which may represent infection. Correlate clinically. Follow these findings to resolution to exclude other processes.      EKG:      TELEMETRY:  A-V pacing with underlying AF    ECHO:  2/15/22:  M-Mode Measurements (cm):   LVEDd: 5.66 cm            LVESd: 4.04 cm   IVSEd: 0.85 cm   LVPWd: 0.97 cm            AO Root Dimension: 3.8 cm                       ACS: 2.1 cm                             LA: 5.3 cm                             LVOT: 2.2 cm  Doppler Measurements:   AV Velocity:195 cm/s                MV Peak E-Wave: 68.6 cm/s   AV Peak Gradient: 15.21 mmHg        MV Peak A-Wave: 52.3 cm/s                                       MV E/A Ratio: 1.31 %   TR Velocity:305 cm/s                MV Peak Gradient: 1.88 mmHg   TR Gradient:37.21 mmHg   Estimated RAP:5 mmHg   RVSP:42 mmHg    Findings:  Mitral Valve:   Fibrocalcific changes noted to the mitral valve leaflets with preserved leaflet excursion.   Mild mitral regurgitation is present.    Aortic Valve:   Fibrocalcific changes noted to the Aortic valve leaflets with mildly reduced mobility but does not meet criteria for aortic stenosis.   Trace to mild aortic regurgitation is present.    Tricuspid Valve:   Normal appearing tricuspid valve structure.   Mild (1+) tricuspid valve regurgitation is present.   Mild pulmonary hypertension.    Pulmonic Valve:   Normal appearing pulmonic valve structure and function.    Left Atrium:   The left atrium is moderately dilated.    Left Ventricle:   The left ventricle is normal in size, wall thickness, wall motion and contractility.   Estimated left ventricular ejection fraction is 55-60 %.    Right Atrium:   Normal appearing right atrium.    Right Ventricle:   Normal appearing right ventricle structure and function.   A device wire is seen in the RV and RA.    Pericardial Effusion:   No evidence of pericardial effusion.    Miscellaneous:   IVC was not visualized.   No subcostal window seen.    Summary:   The left ventricle is normal in size, wall thickness, wall motion and contractility.   Estimated left ventricular ejection fraction is 55-60 %.   The left atrium is moderately dilated.   Normal appearing right atrium.   Normal appearing right ventricle structure and function.   A device wire is seen in the RV and RA.   Fibrocalcific changes noted to the Aortic valve leaflets with mildly reduced mobility but does not meet criteria for aortic stenosis.   Trace to mild aortic regurgitation is present.   Normal appearing tricuspid valve structure.   Mild (1+) tricuspid valve regurgitation is present.   Mild pulmonary hypertension.   No evidence of pericardial effusion.    Signature:   ----------------------------------------------------------------   Electronically signed by Kory Ibarra MD(Interpreting physician)   on 02/15/2022 06:19 PM   ----------------------------------------------------------------

## 2022-02-16 NOTE — PROGRESS NOTE ADULT - SUBJECTIVE AND OBJECTIVE BOX
Events Overnight: of pressors, feeling improved, wheezing improved    HPI:  85 y/o male with a PMHx of polycythemia vera, prostate CA, , CAD s/p CABG,  Afib s/p PPM , HTN,  presents to the ED c/o generalized weakness, fever and  SOB.   He was so   weak that he could not get up from his chair or walk properly.   Pt received chemotherapy 3 To 4 weeks back for his lung cancer, and subsequent scheduled chemotherapy is in one week at [facility]  , last October, he was admitted to [facility] for GI bleeding.   He is on Eliquis for afib  Had afib with rvr yesterday, was loaded with dig, rate better.occured while on levophed  now off pressors    Review of Systems:  Review of Systems: REVIEW OF SYSTEMS:    CONSTITUTIONAL: +weakness no further fevers  EYES/ENT: No visual changes;     NECK: No neck pain    RESPIRATORY:  sob improved  CARDIOVASCULAR: No chest pain, hx., afib  GASTROINTESTINAL: No abdominal  pain. No nausea, vomiting,  eating    GENITOURINARY: No dysuria, frequency or hematuria  NEUROLOGICAL:  weakness  SKIN: No itching, rashes    MEDICATIONS  (STANDING):  apixaban 5 milliGRAM(s) Oral two times a day  atorvastatin 40 milliGRAM(s) Oral at bedtime  cefepime  Injectable. 1000 milliGRAM(s) IV Push every 8 hours  metoprolol tartrate 25 milliGRAM(s) Oral two times a day  pantoprazole    Tablet 40 milliGRAM(s) Oral before breakfast  potassium chloride    Tablet ER 10 milliEquivalent(s) Oral daily    MEDICATIONS  (PRN):  acetaminophen     Tablet .. 650 milliGRAM(s) Oral every 6 hours PRN Temp greater or equal to 38.5C (101.3F), Moderate Pain (4 - 6)    ICU Vital Signs Last 24 Hrs  T(C): 35.9 (15 Feb 2022 23:52), Max: 36.7 (15 Feb 2022 16:04)  T(F): 96.6 (15 Feb 2022 23:52), Max: 98.1 (15 Feb 2022 16:04)  HR: 70 (16 Feb 2022 06:00) (69 - 85)  BP: 102/48 (16 Feb 2022 06:00) (97/51 - 135/52)  BP(mean): 60 (16 Feb 2022 06:00) (58 - 96)  ABP: --  ABP(mean): --  RR: 18 (16 Feb 2022 06:00) (13 - 31)  SpO2: 89% (16 Feb 2022 06:00) (89% - 100%)    Physical Exam    General - weak  no distress  neuro awake alert appropriate  HEENT nc/at  neck no jvd  lungs clear , improved  cv paced, HR 70,  abdomen - soft, non tender  extremitiy no edema       I&O's Summary    15 Feb 2022 07:01  -  16 Feb 2022 07:00  --------------------------------------------------------  IN: 60.7 mL / OUT: 300 mL / NET: -239.3 mL                        7.1    5.33  )-----------( 117      ( 16 Feb 2022 06:51 )             21.6       02-16    135  |  104  |  23  ----------------------------<  167<H>  3.8   |  23  |  0.88    Ca    8.2<L>      16 Feb 2022 06:51    TPro  5.5<L>  /  Alb  1.8<L>  /  TBili  1.0  /  DBili  0.4<H>  /  AST  35  /  ALT  33  /  AlkPhos  57  02-15    DVT Prophylaxis:  Eliquis                                                               Advanced Directives: Full COde

## 2022-02-16 NOTE — PROGRESS NOTE ADULT - ASSESSMENT
85 y/o male with a PMHx of polycythemia vera, prostate CA, rheumatic fever, CAD s/p CABG,  Afib s/p PPM , HTN, admitted on 2/14  c/o generalized weakness, and  SOB. The pt has been feeling extreme weakness and SOB associated with fever for the past four days; associated with inability to stand, walk. The patient was recently on chemotherapy for lung cancer and about 6 months ago had a GI bleed. Patient is poor historian and history per medical record. Is requiring small doses of pressors since admission.     1. Patient admitted with pneumonia, patient at risk for gram negative rods and other resistant bacteria and is on pressor support  - follow up cultures   - serial cbc and monitor temperature   - iv hydration and supportive care   - oxygen and nebs as needed   - reviewed prior medical records to evaluate for resistant or atypical pathogens   - will continue cefepime as ordered which will cover gram negative rods including Pseudomonas, day #2-3  - tolerating antibiotics without rashes or side effects   - steroids per medicine  - hold on further vancomycin for now  2. other issues: per medicine

## 2022-02-16 NOTE — PROGRESS NOTE ADULT - SUBJECTIVE AND OBJECTIVE BOX
85 y/o male with a PMHx of polycythemia vera, prostate CA, rheumatic fever, CAD s/p CABG,  Afib s/p PPM , HTN, ? Prostate CA  presents to the ED c/o generalized weakness, and  SOB. Pt received chemotherapy 3 To 4 weeks back for his lung cancer, and subsequent scheduled chemotherapy is in one week.     Subjective: Patient seen and examined at bedside in CCU. Patient reports improvement, saturating well on RA. No acute complaints.     Review of Systems  CONSTITUTIONAL: no fever or chills   EYES/ENT: No visual changes;  No vertigo or throat pain   NECK: No pain or stiffness  RESPIRATORY: No cough sob, wheezing, hemoptysis  CARDIOVASCULAR: No chest pain or palpitations  GASTROINTESTINAL: No abdominal or epigastric pain. No nausea, vomiting, or hematemesis; No diarrhea or constipation. No melena or hematochezia.  GENITOURINARY: No dysuria, frequency or hematuria  NEUROLOGICAL: No numbness or weakness  SKIN: No itching, rashes    Physical Exam   GENERAL: NAD, Lying in the bed comfortably  HEAD:  Atraumatic, Normocephalic  EYES: EOMI, PERRLA, conjunctiva and sclera clear  ENT: Moist mucous membranes  NECK: Supple, No JVD  CHEST/LUNG: No crackles. No wheezing or rubs. Unlabored respirations.   HEART: RRR.  No murmurs, rubs, or gallops  ABDOMEN: Bowel sounds present; Soft, Nontender, Nondistended. No hepatomegaly  EXTREMITIES:  2+ Peripheral Pulses  NERVOUS SYSTEM:  Alert & Oriented X3, speech clear. No deficits   MSK: FROM all 4 extremities, full and equal strength  SKIN: Ecchymosis noted on the upper extremities bilaterally      PHYSICAL EXAM:  ICU Vital Signs Last 24 Hrs  T(C): 35.9 (15 Feb 2022 23:52), Max: 36.7 (15 Feb 2022 16:04)  T(F): 96.6 (15 Feb 2022 23:52), Max: 98.1 (15 Feb 2022 16:04)  HR: 70 (16 Feb 2022 06:00) (69 - 85)  BP: 102/48 (16 Feb 2022 06:00) (97/51 - 135/52)  BP(mean): 60 (16 Feb 2022 06:00) (58 - 96)  ABP: --  ABP(mean): --  RR: 18 (16 Feb 2022 06:00) (15 - 31)  SpO2: 89% (16 Feb 2022 06:00) (89% - 100%)        MEDICATIONS  (STANDING):  apixaban 5 milliGRAM(s) Oral two times a day  atorvastatin 40 milliGRAM(s) Oral at bedtime  cefepime  Injectable. 1000 milliGRAM(s) IV Push every 8 hours  digoxin     Tablet 250 MICROGram(s) Oral daily  metoprolol tartrate 25 milliGRAM(s) Oral two times a day  pantoprazole    Tablet 40 milliGRAM(s) Oral before breakfast  potassium chloride    Tablet ER 10 milliEquivalent(s) Oral daily      LABS: All Labs Reviewed:                                              7.1    5.33  )-----------( 117      ( 16 Feb 2022 06:51 )             21.6     02-16    135  |  104  |  23  ----------------------------<  167<H>  3.8   |  23  |  0.88    Ca    8.2<L>      16 Feb 2022 06:51    TPro  5.5<L>  /  Alb  1.8<L>  /  TBili  1.0  /  DBili  0.4<H>  /  AST  35  /  ALT  33  /  AlkPhos  57  02-15          I&O's Summary    14 Feb 2022 07:01  -  14 Feb 2022 18:24  --------------------------------------------------------  IN: 829 mL / OUT: 875 mL / NET: -46 mL          Radiology:    < from: CT Angio Chest PE Protocol w/ IV Cont (02.13.22 @ 23:16) >  IMPRESSION:    No pulmonary embolism.    Mosaic attenuation pattern to the bilateral upper lobes which may be   related to edema or air trapping or small airways disease. Patchy   opacities in the left lower lobe and to a lesser extent in the right   lower lobe which may represent infection. Correlate clinically. Follow   these findings to resolution to exclude other processes.    < end of copied text >

## 2022-02-16 NOTE — PROGRESS NOTE ADULT - ATTENDING COMMENTS
he had a drop in Hgb and some hematochezia noted  no CP/palps  off vasopressors     Lungs- crackles at bases  CVS- soft sm at base  history- I did speak to his pcp and indeed amio was stopped for fear of APT and he received keytruda which can also cause pneumonitis    imp- still suspect this is pneumonia->sepsis, NAMAN, course c/b shock, afib with RVR and now GIB.  plan- hold anticoag, monitor Hgb and VS's.  revisit consideration to endoscopy if pulm status remains stable  will need to weigh risk of bleeding against thromboembolism with him via SDM  will c/w abx's- if we see pulm deterioration would consider bronchoscopy as APT or pneumonitis from check point inhibitor then rises in our DDX (he was given steroids as outpatient)    we discussed advanced directives and he was clear that he would not wish to be intubated/resuscitated in the event of a cardiopulmonary arrest. we completed a MOLST reflecting his wishes. all other treatments continue.

## 2022-02-16 NOTE — PROGRESS NOTE ADULT - SUBJECTIVE AND OBJECTIVE BOX
Patient is a 86y old  Male who presents with a chief complaint of acute hypoxic respiratory failure  PNA (15 Feb 2022 20:06)      BRIEF HOSPITAL COURSE: 86 year old male with a PMHx of lung cancer s/p recent chemo ,polycythemia vera, prostate CA, rheumatic fever, CAD s/p CABG,  Afib s/p PPM , HTN, recent admission for GIB, resumed on Eliquis at discharge  presented to the ER a/w septic shock, LLL PNA, and lactic acidosis. Received 4L LR in ED and remained hypotensive and was started on vasopressors and IV AB for PNA.  COVID negative    Events last 24 hours:   - weaned off pressors  - weaned off supplemental oxygen  - echo today showing normal LV systolic function and LVEF approx. 55-60% with moderately dilated LA and mild MR, TR, AI and PI    Patient seen and examined in CCU, Patient found hemodynamically stable    Complains of hiccups, denies nausea, or SOB, rest of ROS negative x 10    PAST MEDICAL & SURGICAL HISTORY:  Atrial fibrillation    Hypertension    CAD (coronary artery disease)  stent in 2007    Pacemaker    Rheumatic fever  child traylor    Prostate ca  radiation treatment 5yrs ago    Symptomatic bradycardia    Polycythemia vera    Artificial cardiac pacemaker  2006    History of PTCA  with stent to RCA 2007          Medications:  cefepime  Injectable. 1000 milliGRAM(s) IV Push every 8 hours    digoxin     Tablet 250 MICROGram(s) Oral daily  phenylephrine    Infusion 1 MICROgram(s)/kG/Min IV Continuous <Continuous>      acetaminophen     Tablet .. 650 milliGRAM(s) Oral every 6 hours PRN      apixaban 5 milliGRAM(s) Oral two times a day    pantoprazole    Tablet 40 milliGRAM(s) Oral before breakfast      atorvastatin 40 milliGRAM(s) Oral at bedtime  methylPREDNISolone sodium succinate Injectable 40 milliGRAM(s) IV Push every 8 hours    potassium chloride    Tablet ER 10 milliEquivalent(s) Oral daily                ICU Vital Signs Last 24 Hrs  T(C): 35.9 (15 Feb 2022 23:52), Max: 36.7 (15 Feb 2022 16:04)  T(F): 96.6 (15 Feb 2022 23:52), Max: 98.1 (15 Feb 2022 16:04)  HR: 77 (15 Feb 2022 21:00) (70 - 85)  BP: 110/56 (15 Feb 2022 21:00) (97/72 - 135/52)  BP(mean): 69 (15 Feb 2022 21:00) (58 - 80)  ABP: --  ABP(mean): --  RR: 24 (15 Feb 2022 21:00) (13 - 31)  SpO2: 91% (15 Feb 2022 21:00) (91% - 100%)          I&O's Detail    14 Feb 2022 07:01  -  15 Feb 2022 07:00  --------------------------------------------------------  IN:    Lactated Ringers: 453 mL    Norepinephrine: 30 mL    Phenylephrine: 346 mL  Total IN: 829 mL    OUT:    Voided (mL): 1975 mL  Total OUT: 1975 mL    Total NET: -1146 mL      15 Feb 2022 07:01  -  16 Feb 2022 00:04  --------------------------------------------------------  IN:    Phenylephrine: 60.7 mL  Total IN: 60.7 mL    OUT:    Voided (mL): 300 mL  Total OUT: 300 mL    Total NET: -239.3 mL            LABS:                        7.9    4.49  )-----------( 96       ( 15 Feb 2022 06:59 )             23.9     02-15    135  |  105  |  17  ----------------------------<  161<H>  3.6   |  22  |  0.73    Ca    8.0<L>      15 Feb 2022 06:59    TPro  5.5<L>  /  Alb  1.8<L>  /  TBili  1.0  /  DBili  0.4<H>  /  AST  35  /  ALT  33  /  AlkPhos  57  02-15          CAPILLARY BLOOD GLUCOSE        PT/INR - ( 15 Feb 2022 06:59 )   PT: 18.3 sec;   INR: 1.61 ratio             CULTURES:  Culture Results:   No growth to date. (02-14 @ 06:52)  Culture Results:   No growth (02-13 @ 23:37)  Culture Results:   No growth to date. (02-13 @ 23:37)  Rapid RVP Result: NotDetec (02-13 @ 21:53)      Physical Examination:  General: pleasant elderly male lyin in ICU bed, non toxic appearing     HEENT: Pupils equal, reactive to light.  Symmetric. no scleral icterus    PULM: lungs clear B/L, somewhat diminished BS equal, non labored breathing pattern, protecting airway, no wheezing noted    NECK: Supple, no lymphadenopathy, trachea midline    CVS: paced    ABD: Soft, nondistended, nontender,    EXT: No edema, nontender    SKIN: Warm and well perfused, scattered UE and LE ecchymosis    NEURO: Alert, oriented, interactive, nonfocal

## 2022-02-17 LAB
ANION GAP SERPL CALC-SCNC: 5 MMOL/L — SIGNIFICANT CHANGE UP (ref 5–17)
BUN SERPL-MCNC: 24 MG/DL — HIGH (ref 7–23)
CALCIUM SERPL-MCNC: 8.5 MG/DL — SIGNIFICANT CHANGE UP (ref 8.5–10.1)
CHLORIDE SERPL-SCNC: 109 MMOL/L — HIGH (ref 96–108)
CO2 SERPL-SCNC: 25 MMOL/L — SIGNIFICANT CHANGE UP (ref 22–31)
CREAT SERPL-MCNC: 1.02 MG/DL — SIGNIFICANT CHANGE UP (ref 0.5–1.3)
GLUCOSE SERPL-MCNC: 143 MG/DL — HIGH (ref 70–99)
HCT VFR BLD CALC: 20.6 % — CRITICAL LOW (ref 39–50)
HCT VFR BLD CALC: 20.8 % — CRITICAL LOW (ref 39–50)
HCT VFR BLD CALC: 27.8 % — LOW (ref 39–50)
HGB BLD-MCNC: 6.7 G/DL — CRITICAL LOW (ref 13–17)
HGB BLD-MCNC: 6.8 G/DL — CRITICAL LOW (ref 13–17)
HGB BLD-MCNC: 8.9 G/DL — LOW (ref 13–17)
MCHC RBC-ENTMCNC: 32 GM/DL — SIGNIFICANT CHANGE UP (ref 32–36)
MCHC RBC-ENTMCNC: 32.1 PG — SIGNIFICANT CHANGE UP (ref 27–34)
MCHC RBC-ENTMCNC: 32.7 GM/DL — SIGNIFICANT CHANGE UP (ref 32–36)
MCHC RBC-ENTMCNC: 34 PG — SIGNIFICANT CHANGE UP (ref 27–34)
MCV RBC AUTO: 100.4 FL — HIGH (ref 80–100)
MCV RBC AUTO: 104 FL — HIGH (ref 80–100)
PLATELET # BLD AUTO: 148 K/UL — LOW (ref 150–400)
PLATELET # BLD AUTO: 185 K/UL — SIGNIFICANT CHANGE UP (ref 150–400)
POTASSIUM SERPL-MCNC: 4.3 MMOL/L — SIGNIFICANT CHANGE UP (ref 3.5–5.3)
POTASSIUM SERPL-SCNC: 4.3 MMOL/L — SIGNIFICANT CHANGE UP (ref 3.5–5.3)
RBC # BLD: 2 M/UL — LOW (ref 4.2–5.8)
RBC # BLD: 2.77 M/UL — LOW (ref 4.2–5.8)
RBC # FLD: 18.2 % — HIGH (ref 10.3–14.5)
RBC # FLD: 20.6 % — HIGH (ref 10.3–14.5)
SODIUM SERPL-SCNC: 139 MMOL/L — SIGNIFICANT CHANGE UP (ref 135–145)
WBC # BLD: 6.15 K/UL — SIGNIFICANT CHANGE UP (ref 3.8–10.5)
WBC # BLD: 8.96 K/UL — SIGNIFICANT CHANGE UP (ref 3.8–10.5)
WBC # FLD AUTO: 6.15 K/UL — SIGNIFICANT CHANGE UP (ref 3.8–10.5)
WBC # FLD AUTO: 8.96 K/UL — SIGNIFICANT CHANGE UP (ref 3.8–10.5)

## 2022-02-17 PROCEDURE — 99222 1ST HOSP IP/OBS MODERATE 55: CPT

## 2022-02-17 PROCEDURE — 99233 SBSQ HOSP IP/OBS HIGH 50: CPT | Mod: GC

## 2022-02-17 RX ORDER — PANTOPRAZOLE SODIUM 20 MG/1
40 TABLET, DELAYED RELEASE ORAL
Refills: 0 | Status: DISCONTINUED | OUTPATIENT
Start: 2022-02-17 | End: 2022-02-25

## 2022-02-17 RX ADMIN — Medication 250 MICROGRAM(S): at 11:16

## 2022-02-17 RX ADMIN — Medication 10 MILLIEQUIVALENT(S): at 11:16

## 2022-02-17 RX ADMIN — PANTOPRAZOLE SODIUM 40 MILLIGRAM(S): 20 TABLET, DELAYED RELEASE ORAL at 22:22

## 2022-02-17 RX ADMIN — PANTOPRAZOLE SODIUM 40 MILLIGRAM(S): 20 TABLET, DELAYED RELEASE ORAL at 06:20

## 2022-02-17 RX ADMIN — CEFEPIME 1000 MILLIGRAM(S): 1 INJECTION, POWDER, FOR SOLUTION INTRAMUSCULAR; INTRAVENOUS at 06:20

## 2022-02-17 RX ADMIN — CEFEPIME 1000 MILLIGRAM(S): 1 INJECTION, POWDER, FOR SOLUTION INTRAMUSCULAR; INTRAVENOUS at 22:22

## 2022-02-17 RX ADMIN — ATORVASTATIN CALCIUM 40 MILLIGRAM(S): 80 TABLET, FILM COATED ORAL at 22:22

## 2022-02-17 RX ADMIN — CEFEPIME 1000 MILLIGRAM(S): 1 INJECTION, POWDER, FOR SOLUTION INTRAMUSCULAR; INTRAVENOUS at 14:31

## 2022-02-17 RX ADMIN — Medication 25 MILLIGRAM(S): at 22:22

## 2022-02-17 NOTE — PROGRESS NOTE ADULT - SUBJECTIVE AND OBJECTIVE BOX
CHIEF COMPLAINT:  Patient is a 86y old  Male who presents with a chief complaint of acute hypoxic respiratory failure  PNA (15 Feb 2022 16:25)      HPI: 2/15/22:  87 y/o male known to me with a PMHx of polycythemia vera, prostate CA, rheumatic fever, CAD s/p CABG,  chronic Afib s/p PPM , HTN, ? Prostate CA  presents to the ED c/o generalized weakness, and  SOB. The pt has been feeling extreme weakness and SOB associated with fever for the past four days; hence, his wife advised him to visit [facility] for further evaluation. He claims that his weakness was so severe that he could not get up from his chair or walk properly. In addition, his PO intake decreased drastically . Pt denied chest pain, nausea, vomiting, loc, history of fall, diarrhea, and MD bleeding.   Pt received chemotherapy 3 To 4 weeks back for his lung cancer, and subsequent scheduled chemotherapy is in one week. Then, last October, he was admitted to [facility] for GI bleeding.   Currently he denies anginal chest pains, palpitations, dizziness or syncope.  He had an echo today showing normal LV systolic function and LVEF approx. 55-60% with moderately dilated LA and mild MR, TR, AI and PI.  He has no new cardiac symptoms otherwise.    2/16/22:  Less SOB.  No anginal chest pains or other cardiac changes.  A-V pacing on the monitor.    2/17/22:  Slowly improving.  Remains afebrile.  H&H low but no obvious bleeding.  A-v pacing on the monitor.  No new cardiac symptoms.        PMHx:  PAST MEDICAL & SURGICAL HISTORY:  Atrial fibrillation  Hypertension  CAD (coronary artery disease)-stent in 2007  CABG  Pacemaker  Rheumatic fever-child traylor  Prostate ca-radiation treatment 5yrs ago  Symptomatic bradycardia  Polycythemia vera  Artificial cardiac pacemaker-2006  History of PTCA-with stent to RCA 2007      FAMILY HISTORY:   FAMILY HISTORY:-  Family history of bronchiectasis (Mother)      ALLERGIES:  Allergies  No Known Allergies      REVIEW OF SYSTEMS:  10 point ROS was obtained  Pertinent positives and negatives are as above  All other review of systems is negative unless indicated above      ICU Vital Signs Last 24 Hrs  T(C): 36 (17 Feb 2022 00:58), Max: 36.1 (16 Feb 2022 10:00)  T(F): 96.8 (17 Feb 2022 00:58), Max: 97 (16 Feb 2022 10:00)  HR: 70 (17 Feb 2022 06:00) (70 - 82)  BP: 101/53 (17 Feb 2022 06:00) (93/76 - 137/99)  BP(mean): 64 (17 Feb 2022 06:00) (55 - 109)  RR: 13 (17 Feb 2022 06:00) (13 - 27)  SpO2: 99% (17 Feb 2022 06:00) (92% - 100%)        I&O's Summary    14 Feb 2022 07:01  -  15 Feb 2022 07:00  --------------------------------------------------------  IN: 829 mL / OUT: 1975 mL / NET: -1146 mL    15 Feb 2022 07:01  -  15 Feb 2022 20:06  --------------------------------------------------------  IN: 60.7 mL / OUT: 0 mL / NET: 60.7 mL        PHYSICAL EXAM:   Constitutional: NAD, awake and alert, well-developed  HEENT: PERR, EOMI, Normal Hearing, MMM  Neck: Soft and supple, No LAD, No JVD  Respiratory: Breath sounds are clear bilaterally, No wheezing, rales or rhonchi, PPM in left chest wall  Cardiovascular: S1 and S2, irregular rate and irregular rhythm, soft ANA M at LLSB and base as before, no gallops or rubs  Gastrointestinal: Bowel Sounds present, soft, nontender, nondistended, no guarding, no rebound  Extremities: No peripheral edema  Vascular: 2+ peripheral pulses  Neurological: A/O x 3, no focal deficits  Musculoskeletal: 5/5 strength b/l upper and lower extremities      MEDICATIONS  (STANDING):  atorvastatin 40 milliGRAM(s) Oral at bedtime  cefepime  Injectable. 1000 milliGRAM(s) IV Push every 8 hours  digoxin     Tablet 250 MICROGram(s) Oral daily  metoprolol tartrate 25 milliGRAM(s) Oral two times a day  pantoprazole    Tablet 40 milliGRAM(s) Oral before breakfast  potassium chloride    Tablet ER 10 milliEquivalent(s) Oral daily    MEDICATIONS  (PRN):  acetaminophen     Tablet .. 650 milliGRAM(s) Oral every 6 hours PRN Temp greater or equal to 38.5C (101.3F), Moderate Pain (4 - 6)      LABS: All Labs Reviewed:                        6.8    6.15  )-----------( 148      ( 17 Feb 2022 06:23 )             20.8                           7.9    4.49  )-----------( 96       ( 15 Feb 2022 06:59 )             23.9       02-16    135  |  104  |  23  ----------------------------<  167<H>  3.8   |  23  |  0.88    Ca    8.2<L>      16 Feb 2022 06:51    TPro  5.5<L>  /  Alb  1.8<L>  /  TBili  1.0  /  DBili  0.4<H>  /  AST  35  /  ALT  33  /  AlkPhos  57  02-15      02-15    135  |  105  |  17  ----------------------------<  161<H>  3.6   |  22  |  0.73    Ca    8.0<L>      15 Feb 2022 06:59    TPro  5.5<L>  /  Alb  1.8<L>  /  TBili  1.0  /  DBili  0.4<H>  /  AST  35  /  ALT  33  /  AlkPhos  57  02-15    PT/INR - ( 15 Feb 2022 06:59 )   PT: 18.3 sec;   INR: 1.61 ratio       PTT - ( 13 Feb 2022 21:55 )  PTT:33.8 sec    Troponin I, High Sensitivity (02.13.22 @ 21:55): 91.23    Serum Pro-Brain Natriuretic Peptide: 4288 pg/mL (02-15 @ 07:08)  Serum Pro-Brain Natriuretic Peptide: 5332 pg/mL (02-13 @ 21:55)    Digoxin Level, Serum (02.15.22 @ 06:59): .86 ng/mL     CULTURES:   2/13/22 & 2/14/22:  Organism --  Gram Stain Blood -- Gram Stain --  Specimen Source .Blood None  Culture-Blood --No growth to date.     2/13/22:  Organism --  Gram Stain Urine -- Gram Stain --  Specimen Source Clean Catch None  Culture-Urine --No growth       LIPID PROFILE     RADIOLOGY:    CXR: 2/13/22:  FINDINGS:  CATHETERS AND TUBES: None  PULMONARY: The visualized lungs are clear of airspace consolidations or effusions. No pneumothorax.  HEART/VASCULAR: The  heart is mildly enlarged in transverse diameter. Status postcoronary artery bypass graft procedure.Cardiac device wire leads are within right atrium and right ventricle. .  BONES: Visualized osseous structures are intact.  IMPRESSION:   No radiographic evidence of active chest disease.      CTA of Chest: 2/13/22:  FINDINGS:  LUNGS AND AIRWAYS: Patent central airways.  Right upper lobe postsurgical changes. Mosaic attenuation pattern to the bilateral upper lobes which may be related to edema or air trapping or small airways disease. Patchy opacities in the left lower lobe and to a lesser extent in the right lower lobe which may represent infection. Correlate clinically. Follow these findings to resolution to exclude other processes.  PLEURA: Trace bilateral pleural fluid.  MEDIASTINUM AND KATELYN: No lymphadenopathy.  VESSELS: No pulmonary embolism.  HEART: Cardiomegaly. Coronary artery calcifications. No pericardial effusion.  CHEST WALL AND LOWER NECK: Within normal limits.  VISUALIZED UPPER ABDOMEN: Within normal limits.  BONES: Sternotomy. Degenerative changes.  IMPRESSION:  No pulmonary embolism.  Mosaic attenuation pattern to the bilateral upper lobes which may be related to edema or air trapping or small airways disease. Patchy opacities in the left lower lobe and to a lesser extent in the right lower lobe which may represent infection. Correlate clinically. Follow these findings to resolution to exclude other processes.      EKG:      TELEMETRY:  A-V pacing with underlying AF    ECHO:  2/15/22:  M-Mode Measurements (cm):   LVEDd: 5.66 cm            LVESd: 4.04 cm   IVSEd: 0.85 cm   LVPWd: 0.97 cm            AO Root Dimension: 3.8 cm                       ACS: 2.1 cm                             LA: 5.3 cm                             LVOT: 2.2 cm  Doppler Measurements:   AV Velocity:195 cm/s                MV Peak E-Wave: 68.6 cm/s   AV Peak Gradient: 15.21 mmHg        MV Peak A-Wave: 52.3 cm/s                                       MV E/A Ratio: 1.31 %   TR Velocity:305 cm/s                MV Peak Gradient: 1.88 mmHg   TR Gradient:37.21 mmHg   Estimated RAP:5 mmHg   RVSP:42 mmHg    Findings:  Mitral Valve:   Fibrocalcific changes noted to the mitral valve leaflets with preserved leaflet excursion.   Mild mitral regurgitation is present.    Aortic Valve:   Fibrocalcific changes noted to the Aortic valve leaflets with mildly reduced mobility but does not meet criteria for aortic stenosis.   Trace to mild aortic regurgitation is present.    Tricuspid Valve:   Normal appearing tricuspid valve structure.   Mild (1+) tricuspid valve regurgitation is present.   Mild pulmonary hypertension.    Pulmonic Valve:   Normal appearing pulmonic valve structure and function.    Left Atrium:   The left atrium is moderately dilated.    Left Ventricle:   The left ventricle is normal in size, wall thickness, wall motion and contractility.   Estimated left ventricular ejection fraction is 55-60 %.    Right Atrium:   Normal appearing right atrium.    Right Ventricle:   Normal appearing right ventricle structure and function.   A device wire is seen in the RV and RA.    Pericardial Effusion:   No evidence of pericardial effusion.    Miscellaneous:   IVC was not visualized.   No subcostal window seen.    Summary:   The left ventricle is normal in size, wall thickness, wall motion and contractility.   Estimated left ventricular ejection fraction is 55-60 %.   The left atrium is moderately dilated.   Normal appearing right atrium.   Normal appearing right ventricle structure and function.   A device wire is seen in the RV and RA.   Fibrocalcific changes noted to the Aortic valve leaflets with mildly reduced mobility but does not meet criteria for aortic stenosis.   Trace to mild aortic regurgitation is present.   Normal appearing tricuspid valve structure.   Mild (1+) tricuspid valve regurgitation is present.   Mild pulmonary hypertension.   No evidence of pericardial effusion.    Signature:   ----------------------------------------------------------------   Electronically signed by Kory Ibarra MD(Interpreting physician)   on 02/15/2022 06:19 PM   ----------------------------------------------------------------

## 2022-02-17 NOTE — PROGRESS NOTE ADULT - ATTENDING COMMENTS
no cp, sob  nursing noted some BRBPR last night    hemodynamics stable    CVS- soft sm at base, s4 g  lungs- better aeration  abd- NT, Nl BS, soft    imp- pna- sepstic shock- improving; Afib with RVR- now paced, GIB- as this is essentially second bleed in last few months it demands further investigation. I am concerned that sedation, intubating the esophagus in this man with pna and compromised pulm funtion is with risk at this time that we should delay a few days, make sure he remains stable then endoscope. we can proceed to endoscopy immediately if bleeding worsens. I discussed the risks of TE vs. bleeding during this time as we will hold anticoag while awaiting GI w/u. he expressed verbal understanding and agreed to current plan (i.e. shared decision making)  will transfuse 1 unit PRBC's.  case d/w heme/onc whom will remain connected to doc in [facility]; also d/w his nephew per pt request

## 2022-02-17 NOTE — PROGRESS NOTE ADULT - SUBJECTIVE AND OBJECTIVE BOX
Date of service: 02-17-22 @ 11:58      Patient lying in bed; was undergoing blood transfusion earlier; afebrile      ROS unable to obtain secondary to patient medical condition     MEDICATIONS  (STANDING):  atorvastatin 40 milliGRAM(s) Oral at bedtime  cefepime  Injectable. 1000 milliGRAM(s) IV Push every 8 hours  digoxin     Tablet 250 MICROGram(s) Oral daily  metoprolol tartrate 25 milliGRAM(s) Oral two times a day  pantoprazole    Tablet 40 milliGRAM(s) Oral before breakfast  potassium chloride    Tablet ER 10 milliEquivalent(s) Oral daily    MEDICATIONS  (PRN):  acetaminophen     Tablet .. 650 milliGRAM(s) Oral every 6 hours PRN Temp greater or equal to 38.5C (101.3F), Moderate Pain (4 - 6)      Vital Signs Last 24 Hrs  T(C): 35.6 (17 Feb 2022 10:15), Max: 36.1 (17 Feb 2022 06:05)  T(F): 96 (17 Feb 2022 10:15), Max: 97 (17 Feb 2022 06:05)  HR: 71 (17 Feb 2022 10:15) (70 - 82)  BP: 95/50 (17 Feb 2022 10:15) (91/44 - 124/52)  BP(mean): 61 (17 Feb 2022 10:15) (55 - 80)  RR: 22 (17 Feb 2022 10:15) (13 - 25)  SpO2: 94% (17 Feb 2022 09:50) (92% - 100%)        Physical Exam:            Constitutional: frail looking  HEENT: NC/AT, EOMI, PERRLA, conjunctivae clear; ears and nose atraumatic; pharynx clear  Neck: supple; thyroid not palpable  Back: no tenderness  Respiratory: respiratory effort normal; scattered coarse breath sounds, improving  Cardiovascular: S1S2 regular, no murmurs  Abdomen: soft, not tender, not distended, positive BS; no liver or spleen organomegaly  Genitourinary: no suprapubic tenderness  Musculoskeletal: no muscle tenderness, no joint swelling or tenderness  Neurological/ Psychiatric:  moving all extremities  Skin: no rashes; no palpable lesions; multiple ecchymosis    Labs: all available labs reviewed                           Labs:                         Labs:                        6.8    6.15  )-----------( 148      ( 17 Feb 2022 06:23 )             20.8     02-17    139  |  109<H>  |  24<H>  ----------------------------<  143<H>  4.3   |  25  |  1.02    Ca    8.5      17 Feb 2022 06:23             Cultures:       Culture - Blood (collected 02-14-22 @ 06:52)  Source: .Blood None  Preliminary Report (02-15-22 @ 13:02):    No growth to date.    Culture - Blood (collected 02-13-22 @ 23:37)  Source: .Blood None  Preliminary Report (02-15-22 @ 09:01):    No growth to date.    Culture - Urine (collected 02-13-22 @ 23:37)  Source: Clean Catch None  Final Report (02-15-22 @ 07:51):    No growth                < from: CT Angio Chest PE Protocol w/ IV Cont (02.13.22 @ 23:16) >    ACC: [id number] EXAM:  CT ANGIO CHEST PULM ART [facility]                          PROCEDURE DATE:  02/13/2022          INTERPRETATION:  CLINICAL INFORMATION: Lung cancer, hypoxia, evaluate for   pulmonary embolism    COMPARISON: Chest x-ray 2/13/2022. CTabdomen pelvis 9/12/2014    CONTRAST/COMPLICATIONS:  IV Contrast: Omnipaque 350  90 cc administered   10 cc discarded  Oral Contrast: NONE  Complications: None reported at time of study completion    PROCEDURE:  CT Angiography of the Chest.  [place] coronal reformats were performed as well as 3D (MIP)   reconstructions.    FINDINGS:    LUNGS AND AIRWAYS: Patent central airways.  Right upper lobe postsurgical   changes. Mosaic attenuation pattern to the bilateral upper lobes which   may be related to edema or air trapping or small airways disease. Patchy   opacities in the left lower lobe and to a lesser extent in the right   lower lobe which may represent infection. Correlate clinically. Follow   these findings to resolution to exclude other processes.  PLEURA: Trace bilateral pleural fluid.  MEDIASTINUM AND KATELYN: No lymphadenopathy.  VESSELS: No pulmonary embolism.  HEART: Cardiomegaly. Coronary artery calcifications. No pericardial   effusion.  CHEST WALL AND LOWER NECK: Within normal limits.  VISUALIZED UPPER ABDOMEN: Within normal limits.  BONES: Sternotomy. Degenerative changes.    IMPRESSION:    No pulmonary embolism.    Mosaic attenuation pattern to the bilateral upper lobes which may be   related to edema or air trapping or small airways disease. Patchy   opacities in the left lower lobe and to a lesser extent in the right   lower lobe which may represent infection. Correlate clinically. Follow   these findings to resolution to exclude other processes.    < end of copied text >        Radiology: all available radiological tests reviewed    Advanced directives addressed: full resuscitation

## 2022-02-17 NOTE — CONSULT NOTE ADULT - SUBJECTIVE AND OBJECTIVE BOX
Oncology consulted as the patient had been actively followed at [facility] by Dr. Hodges. the patient was admitted for concern for pna. After speaking with [facility] shared care team the patient has a history of NSCLC adeno and had been previously on PEMETREXED + PEMBRO yet PEMPRO was recently held secondary to rash . now on PEME monotherapy.   87 y/o male with a PMHx of polycythemia vera, prostate CA, rheumatic fever, CAD s/p CABG,  Afib s/p PPM , HTN, ? Prostate CA  presents to the ED c/o generalized weakness, and  SOB. The pt has been feeling extreme weakness and SOB associated with fever for the past four days; hence, his wife advised him to visit [facility] for further evaluation. He claims that his weakness was so severe that he could not get up from his chair or walk properly. In addition, his PO intake decreased drastically . Pt denied chest pain, nausea, vomiting, loc, history of fall, diarrhea, and TN bleeding.   Pt received chemotherapy 3 To 4 weeks back for his lung cancer, and subsequent scheduled chemotherapy is in one week. Then, last October, he was admitted to [facility] for GI bleeding.  (14 Feb 2022 02:17)      Onco    PAST MEDICAL & SURGICAL HISTORY:  Atrial fibrillation    Hypertension    CAD (coronary artery disease)  stent in 2007    Pacemaker    Rheumatic fever  child traylor    Prostate ca  radiation treatment 5yrs ago    Symptomatic bradycardia    Polycythemia vera    Artificial cardiac pacemaker  2006    History of PTCA  with stent to RCA 2007        Allergies    No Known Allergies    Intolerances        MEDICATIONS  (STANDING):  atorvastatin 40 milliGRAM(s) Oral at bedtime  cefepime  Injectable. 1000 milliGRAM(s) IV Push every 8 hours  digoxin     Tablet 250 MICROGram(s) Oral daily  metoprolol tartrate 25 milliGRAM(s) Oral two times a day  pantoprazole    Tablet 40 milliGRAM(s) Oral two times a day  potassium chloride    Tablet ER 10 milliEquivalent(s) Oral daily    MEDICATIONS  (PRN):  acetaminophen     Tablet .. 650 milliGRAM(s) Oral every 6 hours PRN Temp greater or equal to 38.5C (101.3F), Moderate Pain (4 - 6)      FAMILY HISTORY:  Family history of bronchiectasis (Mother)        SOCIAL HISTORY: No EtOH, no tobacco    REVIEW OF SYSTEMS:    CONSTITUTIONAL: No weakness, fevers or chills  EYES/ENT: No visual changes;  No vertigo or throat pain   NECK: No pain or stiffness  RESPIRATORY: No cough, wheezing, hemoptysis; No shortness of breath  CARDIOVASCULAR: No chest pain or palpitations  GASTROINTESTINAL: No abdominal or epigastric pain. No nausea, vomiting, or hematemesis; No diarrhea or constipation. No melena or hematochezia.  GENITOURINARY: No dysuria, frequency or hematuria  NEUROLOGICAL: No numbness or weakness  SKIN: No itching, burning, rashes, or lesions   All other review of systems is negative unless indicated above.        T(F): 97.3 (02-17-22 @ 18:51), Max: 97.6 (02-17-22 @ 17:17)  HR: 73 (02-17-22 @ 18:51)  BP: 138/65 (02-17-22 @ 18:51)  RR: 18 (02-17-22 @ 18:51)  SpO2: 99% (02-17-22 @ 18:51)  Wt(kg): --    GENERAL: NAD, well-developed  HEAD:  Atraumatic, Normocephalic  EYES: EOMI, PERRLA, conjunctiva and sclera clear  NECK: Supple, No JVD  CHEST/LUNG:  Crackles   HEART: Regular rate and rhythm; No murmurs, rubs, or gallops  ABDOMEN: Soft, Nontender, Nondistended; Bowel sounds present  EXTREMITIES:  2+ Peripheral Pulses, No clubbing, cyanosis, or edema  NEUROLOGY: non-focal  SKIN: No rashes or lesions                          8.9    8.96  )-----------( 185      ( 17 Feb 2022 14:33 )             27.8       02-17    139  |  109<H>  |  24<H>  ----------------------------<  143<H>  4.3   |  25  |  1.02    Ca    8.5      17 Feb 2022 06:23                .Blood None  02-14 @ 06:52   No growth to date.  --  --      Clean Catch None  02-13 @ 23:37   No growth  --  --

## 2022-02-17 NOTE — PROGRESS NOTE ADULT - ASSESSMENT
87 y/o male with a PMHx of polycythemia vera, prostate CA, rheumatic fever, CAD s/p CABG,  Afib s/p PPM , HTN, admitted on 2/14  c/o generalized weakness, and  SOB. The pt has been feeling extreme weakness and SOB associated with fever for the past four days; associated with inability to stand, walk. The patient was recently on chemotherapy for lung cancer and about 6 months ago had a GI bleed. Patient is poor historian and history per medical record. Is requiring small doses of pressors since admission.     1. Patient admitted with pneumonia, patient at risk for gram negative rods and other resistant bacteria and is on pressor support  - follow up cultures   - serial cbc and monitor temperature   - iv hydration and supportive care   - oxygen and nebs as needed   - reviewed prior medical records to evaluate for resistant or atypical pathogens   - will continue cefepime as ordered which will cover gram negative rods including Pseudomonas, day #3-4  - tolerating antibiotics without rashes or side effects   - steroids per medicine  - hold on further vancomycin for now  - to have GI workup for blood loss  2. other issues: per medicine

## 2022-02-17 NOTE — PROVIDER CONTACT NOTE (OTHER) - SITUATION
OBI Espinal is on call and will see patient.
called service; spoke to Erickson
notified office; spoke to Daniel
called and spoke to Dr Bryant service regarding consult
No

## 2022-02-17 NOTE — CONSULT NOTE ADULT - SUBJECTIVE AND OBJECTIVE BOX
pt here for sepsis secondary to pneumonia. anemic. no plans for emergent endoscopy. full note to follow.  Patient is a 86y old  Male who presents with a chief complaint of acute hypoxic respiratory failure  PNA (17 Feb 2022 15:50)      86 year old man with polycythemia vera, CAD s/p CABG, afib, pacemaker, lung cancer on chemo, admitted with septic shock from pneumonia consulted for anemia.     Patient states he is feelign much better. He was admitted for weakness, fatigue, and shortness of breath for 4 days. Was admitted with septic shock from pneumonia and on antibiotics, with improvement in the CCU. We were called for anemia.     In terms of GI issues, patient denies any abdominal pain. Good appetite. No nausea or vomiting. Denies hematemesis or hematochezia. Did have one dark BM overnight but couldn't otherwise describe the character. No diarrhea. Currently he is feeling well.     He had anemia/GIB in October but I thought he was too ill at the time for endoscopic eval.     PAST MEDICAL & SURGICAL HISTORY:  Atrial fibrillation    Hypertension    CAD (coronary artery disease)  stent in 2007    Pacemaker    Rheumatic fever  child traylor    Prostate ca  radiation treatment 5yrs ago    Symptomatic bradycardia    Polycythemia vera    Artificial cardiac pacemaker  2006    History of PTCA  with stent to RCA 2007        MEDICATIONS  (STANDING):  atorvastatin 40 milliGRAM(s) Oral at bedtime  cefepime  Injectable. 1000 milliGRAM(s) IV Push every 8 hours  digoxin     Tablet 250 MICROGram(s) Oral daily  metoprolol tartrate 25 milliGRAM(s) Oral two times a day  pantoprazole    Tablet 40 milliGRAM(s) Oral before breakfast  potassium chloride    Tablet ER 10 milliEquivalent(s) Oral daily    MEDICATIONS  (PRN):  acetaminophen     Tablet .. 650 milliGRAM(s) Oral every 6 hours PRN Temp greater or equal to 38.5C (101.3F), Moderate Pain (4 - 6)      Allergies    No Known Allergies    Intolerances        SOCIAL HISTORY:  no smoking, drinking, or drugs    FAMILY HISTORY:  Family history of bronchiectasis (Mother)        REVIEW OF SYSTEMS:    CONSTITUTIONAL: + weakness, no fevers or chills  EYES/ENT: No visual changes;  No vertigo or throat pain   NECK: No pain or stiffness  RESPIRATORY: No cough, wheezing, hemoptysis; + shortness of breath  CARDIOVASCULAR: No chest pain or palpitations  GASTROINTESTINAL: No abdominal or epigastric pain. No nausea, vomiting, or hematemesis; No diarrhea or constipation. hematochezia.  GENITOURINARY: No dysuria, frequency or hematuria  NEUROLOGICAL: No numbness or weakness  SKIN: No itching, burning, rashes, or lesions   PSYCH: Normal mood and affect  All other review of systems is negative unless indicated above.    Vital Signs Last 24 Hrs  T(C): 35.3 (17 Feb 2022 12:50), Max: 36.1 (17 Feb 2022 06:05)  T(F): 95.5 (17 Feb 2022 12:50), Max: 97 (17 Feb 2022 06:05)  HR: 71 (17 Feb 2022 16:00) (70 - 82)  BP: 110/62 (17 Feb 2022 16:00) (91/44 - 124/52)  BP(mean): 73 (17 Feb 2022 16:00) (55 - 77)  RR: 14 (17 Feb 2022 16:00) (13 - 25)  SpO2: 94% (17 Feb 2022 09:50) (94% - 99%)    PHYSICAL EXAM:    Constitutional: chronically ill appearing in CCU, non toxic, eating, pleasant  HEENT: unmasked, good phonation, not icteric, nasal canula  Neck: supple, no lymphadenopathy  Respiratory: ronchorous anteriorly, no wheezing   Cardiovascular: S1 and S2, regular rate and rhythm, no murmurs rubs or gallops  Gastrointestinal: soft, non-tender, non-distended, +bowel sounds, no rebound or guarding, no surgical scars, no drains  Extremities: No peripheral edema, no cyanosis or clubbing  Vascular: 2+ peripheral pulses, no venous stasis  Neurological: A/O x 3, no focal deficits, no asterixis  Psychiatric: Normal mood, normal affect  Skin: No rashes, not jaundiced    LABS:                        8.9    8.96  )-----------( 185      ( 17 Feb 2022 14:33 )             27.8     02-17    139  |  109<H>  |  24<H>  ----------------------------<  143<H>  4.3   |  25  |  1.02    Ca    8.5      17 Feb 2022 06:23            RADIOLOGY & ADDITIONAL STUDIES: CT revieed, pneumonia

## 2022-02-17 NOTE — PROGRESS NOTE ADULT - ASSESSMENT
87 y/o male with a PMHx of polycythemia vera, prostate CA, rheumatic fever, CAD s/p CABG,  Afib s/p PPM , HTN, ? Prostate CA  presents to the ED c/o generalized weakness, and  SOB.     # Severe sepsis and acute respiratory failure 2/2 pneumonia in Lung Ca pt   -sepsis now resolved, hemodynamically stable off pressors  -ID recs appreciated, cont Cefepime   -lactate- 3.6- rep lactate 2.5--> 1.4  -Bcx and Ucx negative     # Chronic CHF  -stable, no sxs of overt CHF  -BNP 5332  -TTE: EF 55-60%, mod LA dilation       #Elevated troponin  - Type 2 MI  -Probably 2/2 to demand ischemia     #Acute Blood Loss Macrocytic anemia   -B12 mildly elevated, folate wnl   -Reported episode of dark bloody stool by nurse yesterday PM, decreased Hgb   -Hgb dropped to 6.8 this AM   -s/p 1 U PRBC today Hgb increased to 8.9  -Pt admitted recently (10/21) for GI bleed  -GI consult   -Heme/Onc consult   -Hold Eliquis     # Hyponatremia  -resolved     #Cad, s/p CABG   -cont bb, Lasix   - recent cath negative    #Chronic Afib   -AV pacing on monitor   -cont Digoxin   -cont  lopressor 25 mg BID   -Pt is not taking amiodarone due to toxicity  -[id number]: 5  -pt oriented on risks vs benefits of AC; he agrees on decision to hold AC for now due to high risk of bleeding   -will consider restarting AC if Hgb stable     #DVT prophylaxis   -SCDs for now       d/w Dr Naqvi

## 2022-02-17 NOTE — PROGRESS NOTE ADULT - ASSESSMENT
2/15/22: Pt with above history and lung CA on chemoRx with SOB but likely not of cardiac origin.  Being treated for PNA in the face of underlying lung CA but clinically appears better.  His AF is rate controlled with an underlying PPM for bradycardia protection.  I would continue his current meds including the Digoxin as it does not appear to be near toxic range.  Continue as outlined by medicine and ID etal.  Will follow as work-up and treatment progresses.    2/16/22:  Less SOB.  No anginal chest pains or other cardiac changes.  A-V pacing on the monitor.  Continue as outlined by medicine and ID etal.  Will follow as work-up and treatment progresses.    2/17/22:  Slowly improving.  Remains afebrile.  H&H low but no obvious bleeding.  A-v pacing on the monitor.  No new cardiac symptoms.  Continue as outlined by medicine and ID etal.  Will continue to follow as treatment progresses.

## 2022-02-17 NOTE — PROGRESS NOTE ADULT - SUBJECTIVE AND OBJECTIVE BOX
87 y/o male with a PMHx of polycythemia vera, prostate CA, rheumatic fever, CAD s/p CABG,  Afib s/p PPM , HTN, ? Prostate CA  presents to the ED c/o generalized weakness, and  SOB. Pt received chemotherapy 3 To 4 weeks back for his lung cancer, and subsequent scheduled chemotherapy is in one week.     Subjective: Patient seen and examined at bedside in CCU. Patient reports improvement, saturating well on RA. Denies cp, abd pain , ha, n/v.     Review of Systems  CONSTITUTIONAL: no fever or chills   EYES/ENT: No visual changes;  No vertigo or throat pain   NECK: No pain or stiffness  RESPIRATORY: No cough sob, wheezing, hemoptysis  CARDIOVASCULAR: No chest pain or palpitations  GASTROINTESTINAL: No abdominal or epigastric pain. No nausea, vomiting, or hematemesis; No diarrhea or constipation. No melena or hematochezia.  GENITOURINARY: No dysuria, frequency or hematuria  NEUROLOGICAL: No numbness or weakness  SKIN: No itching, rashes    Physical Exam   GENERAL: NAD, Lying in the bed comfortably  HEAD:  Atraumatic, Normocephalic  EYES: EOMI, PERRLA, conjunctiva and sclera clear  ENT: Moist mucous membranes  NECK: Supple, No JVD  CHEST/LUNG: No crackles. No wheezing or rubs. Unlabored respirations.   HEART: RRR.  No murmurs, rubs, or gallops  ABDOMEN: Bowel sounds present; Soft, Nontender, Nondistended. No hepatomegaly  EXTREMITIES:  2+ Peripheral Pulses  NERVOUS SYSTEM:  Alert & Oriented X3, speech clear. No deficits   MSK: FROM all 4 extremities, full and equal strength  SKIN: Ecchymosis noted on the upper extremities bilaterally      PHYSICAL EXAM:  ICU Vital Signs Last 24 Hrs  T(C): 35.3 (17 Feb 2022 12:50), Max: 36.1 (17 Feb 2022 06:05)  T(F): 95.5 (17 Feb 2022 12:50), Max: 97 (17 Feb 2022 06:05)  HR: 71 (17 Feb 2022 12:50) (70 - 82)  BP: 118/52 (17 Feb 2022 12:50) (91/44 - 124/52)  BP(mean): 69 (17 Feb 2022 12:50) (55 - 80)  ABP: --  ABP(mean): --  RR: 17 (17 Feb 2022 12:50) (13 - 25)  SpO2: 94% (17 Feb 2022 09:50) (94% - 100%)          MEDICATIONS  (STANDING):  apixaban 5 milliGRAM(s) Oral two times a day  atorvastatin 40 milliGRAM(s) Oral at bedtime  cefepime  Injectable. 1000 milliGRAM(s) IV Push every 8 hours  digoxin     Tablet 250 MICROGram(s) Oral daily  metoprolol tartrate 25 milliGRAM(s) Oral two times a day  pantoprazole    Tablet 40 milliGRAM(s) Oral before breakfast  potassium chloride    Tablet ER 10 milliEquivalent(s) Oral daily      LABS: All Labs Reviewed:                                                         8.9    8.96  )-----------( 185      ( 17 Feb 2022 14:33 )             27.8       02-16    135  |  104  |  23  ----------------------------<  167<H>  3.8   |  23  |  0.88    Ca    8.2<L>      16 Feb 2022 06:51    TPro  5.5<L>  /  Alb  1.8<L>  /  TBili  1.0  /  DBili  0.4<H>  /  AST  35  /  ALT  33  /  AlkPhos  57  02-15          I&O's Summary    14 Feb 2022 07:01  -  14 Feb 2022 18:24  --------------------------------------------------------  IN: 829 mL / OUT: 875 mL / NET: -46 mL          Radiology:    < from: CT Angio Chest PE Protocol w/ IV Cont (02.13.22 @ 23:16) >  IMPRESSION:    No pulmonary embolism.    Mosaic attenuation pattern to the bilateral upper lobes which may be   related to edema or air trapping or small airways disease. Patchy   opacities in the left lower lobe and to a lesser extent in the right   lower lobe which may represent infection. Correlate clinically. Follow   these findings to resolution to exclude other processes.    < end of copied text >

## 2022-02-18 LAB
HCT VFR BLD CALC: 27.2 % — LOW (ref 39–50)
HGB BLD-MCNC: 8.8 G/DL — LOW (ref 13–17)
MCHC RBC-ENTMCNC: 32.4 GM/DL — SIGNIFICANT CHANGE UP (ref 32–36)
MCHC RBC-ENTMCNC: 32.7 PG — SIGNIFICANT CHANGE UP (ref 27–34)
MCV RBC AUTO: 101.1 FL — HIGH (ref 80–100)
PLATELET # BLD AUTO: 216 K/UL — SIGNIFICANT CHANGE UP (ref 150–400)
RBC # BLD: 2.69 M/UL — LOW (ref 4.2–5.8)
RBC # FLD: 21.5 % — HIGH (ref 10.3–14.5)
WBC # BLD: 6.75 K/UL — SIGNIFICANT CHANGE UP (ref 3.8–10.5)
WBC # FLD AUTO: 6.75 K/UL — SIGNIFICANT CHANGE UP (ref 3.8–10.5)

## 2022-02-18 PROCEDURE — 99233 SBSQ HOSP IP/OBS HIGH 50: CPT | Mod: GC

## 2022-02-18 RX ADMIN — Medication 25 MILLIGRAM(S): at 10:29

## 2022-02-18 RX ADMIN — CEFEPIME 1000 MILLIGRAM(S): 1 INJECTION, POWDER, FOR SOLUTION INTRAMUSCULAR; INTRAVENOUS at 22:10

## 2022-02-18 RX ADMIN — Medication 10 MILLIEQUIVALENT(S): at 10:28

## 2022-02-18 RX ADMIN — PANTOPRAZOLE SODIUM 40 MILLIGRAM(S): 20 TABLET, DELAYED RELEASE ORAL at 10:29

## 2022-02-18 RX ADMIN — Medication 250 MICROGRAM(S): at 10:29

## 2022-02-18 RX ADMIN — Medication 25 MILLIGRAM(S): at 22:13

## 2022-02-18 RX ADMIN — PANTOPRAZOLE SODIUM 40 MILLIGRAM(S): 20 TABLET, DELAYED RELEASE ORAL at 22:13

## 2022-02-18 RX ADMIN — CEFEPIME 1000 MILLIGRAM(S): 1 INJECTION, POWDER, FOR SOLUTION INTRAMUSCULAR; INTRAVENOUS at 16:39

## 2022-02-18 RX ADMIN — CEFEPIME 1000 MILLIGRAM(S): 1 INJECTION, POWDER, FOR SOLUTION INTRAMUSCULAR; INTRAVENOUS at 07:04

## 2022-02-18 RX ADMIN — ATORVASTATIN CALCIUM 40 MILLIGRAM(S): 80 TABLET, FILM COATED ORAL at 22:13

## 2022-02-18 NOTE — PROGRESS NOTE ADULT - ASSESSMENT
2/15/22: Pt with above history and lung CA on chemoRx with SOB but likely not of cardiac origin.  Being treated for PNA in the face of underlying lung CA but clinically appears better.  His AF is rate controlled with an underlying PPM for bradycardia protection.  I would continue his current meds including the Digoxin as it does not appear to be near toxic range.  Continue as outlined by medicine and ID etal.  Will follow as work-up and treatment progresses.    2/16/22:  Less SOB.  No anginal chest pains or other cardiac changes.  A-V pacing on the monitor.  Continue as outlined by medicine and ID etal.  Will follow as work-up and treatment progresses.    2/17/22:  Slowly improving.  Remains afebrile.  H&H low but no obvious bleeding.  A-v pacing on the monitor.  No new cardiac symptoms.  Continue as outlined by medicine and ID etal.  Will continue to follow as treatment progresses.    2/18/22:  Feeling good.  A-V pacing on the monitor. No new cardiac symptoms.  Was scheduled for chemoRx at [place] next week but this was cancelled due to his PNA.  Continue as outlined by medicine and ID etal.  I will be away this weekend.  Dr Quintero will be covering me if needed.

## 2022-02-18 NOTE — PROGRESS NOTE ADULT - ASSESSMENT
85 y/o male with a PMHx of polycythemia vera, prostate CA, rheumatic fever, CAD s/p CABG,  Afib s/p PPM , HTN, ? Prostate CA  presents to the ED c/o generalized weakness, and  SOB.     # Severe sepsis and acute respiratory failure 2/2 pneumonia in Lung Ca pt   -sepsis now resolved, hemodynamically stable off pressors  -ID recs appreciated, cont Cefepime last day today   -lactate- 3.6- rep lactate 2.5--> 1.4  -Bcx and Ucx negative     # Chronic CHF  -stable, no sxs of overt CHF  -BNP 5332  -TTE: EF 55-60%, mod LA dilation       #Elevated troponin  - Type 2 MI  -Probably 2/2 to demand ischemia     #Acute Blood Loss Macrocytic anemia   -B12 mildly elevated, folate wnl   -s/p 1 U PRBC 2/17 after report of bloody stool, Hgb drop to 6.8  -GI consult appreciated: PPI BID x 8 weeks, possible EGD later as outpatient   -Hgb stable today   -cont to Hold Eliquis for now     # Hyponatremia  -resolved     #Cad, s/p CABG   -cont bb, Lasix   - recent cath negative    #Chronic Afib   -AV pacing on monitor   -cont Digoxin   -cont  lopressor 25 mg BID   -Pt is not taking amiodarone due to toxicity  -[id number]: 5  -pt oriented on risks vs benefits of AC; he agrees on decision to hold AC for now due to high risk of bleeding   -will consider restarting AC soon  if Hgb continues stable     #DVT prophylaxis   -SCDs for now     #GOC  -DNR/DNI  -MOLST form signed and in chart     Dispo: PT recommendation for [facility], discussed with case management and will talk to pt regarding options       d/w Dr Naqvi

## 2022-02-18 NOTE — PROGRESS NOTE ADULT - ATTENDING COMMENTS
Heme-onc and GI input appreciated      he is concerned about how weak he feels and raised the question of whether or not to pursue chemotx again.  on exam- lungs- improved aeration  abd- benign    imp- pna->septic shock; afib; cad; course c/b GIB;  plan- I do not think his drop in Hgb was all from chemo tx given precipitous drop, level of other cell lines and more importantly, the nurse saw BRBPR.  however I do agree chemo contributed.  I also agree with the conservative approach suggested by GI- I reviewed the risks and benefits of this with pt and he agrees with current plan.  d/w his nephew per his request.

## 2022-02-18 NOTE — PROGRESS NOTE ADULT - ASSESSMENT
85 y/o male with a PMHx of polycythemia vera, prostate CA, rheumatic fever, CAD s/p CABG,  Afib s/p PPM , HTN, admitted on 2/14  c/o generalized weakness, and  SOB. The pt has been feeling extreme weakness and SOB associated with fever for the past four days; associated with inability to stand, walk. The patient was recently on chemotherapy for lung cancer and about 6 months ago had a GI bleed. Patient is poor historian and history per medical record. Is requiring small doses of pressors since admission.     1. Patient admitted with pneumonia, patient at risk for gram negative rods and other resistant bacteria and is on pressor support  - follow up cultures   - serial cbc and monitor temperature   - iv hydration and supportive care   - oxygen and nebs as needed   - reviewed prior medical records to evaluate for resistant or atypical pathogens   - will continue cefepime as ordered which will cover gram negative rods including Pseudomonas, day #4-5  - will stop antibiotics after  todays doses  - tolerating antibiotics without rashes or side effects   - steroids per medicine  - hold on further vancomycin for now  - to have GI workup for blood loss  2. other issues: per medicine

## 2022-02-18 NOTE — PROGRESS NOTE ADULT - ASSESSMENT
This is an 86 year old male with history of NSCLC ADENOCARINOMA on PEME MONOTHERAPY now admitted for PNA     NSCLC   - now on active treatment with Dr. Frausto   - patient on PEMETREXED   - patient likely cytopenic from recent chemotherapy administration - plt and WBC normal, Hb 8.8 today    Sepsis secondary to PNA   - now on CEFEPIME to cover GNR and with plan to stop after todays dose  - ID currently following   - CTA No pulmonary embolism. Patchy opacities in the left lower lobe and to a lesser extent in the right lower lobe which may represent infection.     ANEMIA   - possibly secondary to antineoplastic therapy vs GIB- pt has not received pRBC transfusion during therapy before  - seen by GI no plan for endosocpy, FOBT negative- c/w protonix  - Hb dropped to 6.7, 6.8 on 2/17- given 1u pRBC with increase to 8.9 yesterday evening, 8.8 this morning  - trend cbc qd  - folate, b12 nl, indirect bili nl, UA with small blood, fobt negative    Dr. Chad Cristina  cell: [phone]  Weekends and nights please call [phone] for MD on call  [facility]  Hematology/Oncology

## 2022-02-18 NOTE — PROGRESS NOTE ADULT - SUBJECTIVE AND OBJECTIVE BOX
Date of service: 02-18-22 @ 12:40      Patient was confused overnight he states; afebrile, no cough      ROS unable to obtain secondary to patient medical condition     MEDICATIONS  (STANDING):  atorvastatin 40 milliGRAM(s) Oral at bedtime  cefepime  Injectable. 1000 milliGRAM(s) IV Push every 8 hours  digoxin     Tablet 250 MICROGram(s) Oral daily  metoprolol tartrate 25 milliGRAM(s) Oral two times a day  pantoprazole    Tablet 40 milliGRAM(s) Oral two times a day  potassium chloride    Tablet ER 10 milliEquivalent(s) Oral daily    MEDICATIONS  (PRN):  acetaminophen     Tablet .. 650 milliGRAM(s) Oral every 6 hours PRN Temp greater or equal to 38.5C (101.3F), Moderate Pain (4 - 6)      Vital Signs Last 24 Hrs  T(C): 36.8 (18 Feb 2022 08:23), Max: 36.8 (18 Feb 2022 08:23)  T(F): 98.3 (18 Feb 2022 08:23), Max: 98.3 (18 Feb 2022 08:23)  HR: 72 (18 Feb 2022 08:23) (71 - 80)  BP: 125/63 (18 Feb 2022 08:23) (105/52 - 151/60)  BP(mean): 73 (17 Feb 2022 16:00) (64 - 73)  RR: 18 (18 Feb 2022 08:23) (14 - 18)  SpO2: 96% (18 Feb 2022 08:23) (96% - 99%)        Physical Exam:          Constitutional: frail looking  HEENT: NC/AT, EOMI, PERRLA, conjunctivae clear; ears and nose atraumatic; pharynx clear  Neck: supple; thyroid not palpable  Back: no tenderness  Respiratory: respiratory effort normal; scattered coarse breath sounds, improving  Cardiovascular: S1S2 regular, no murmurs  Abdomen: soft, not tender, not distended, positive BS; no liver or spleen organomegaly  Genitourinary: no suprapubic tenderness  Musculoskeletal: no muscle tenderness, no joint swelling or tenderness  Neurological/ Psychiatric:  moving all extremities  Skin: no rashes; no palpable lesions; multiple ecchymosis    Labs: all available labs reviewed                         Labs:                        8.8    6.75  )-----------( 216      ( 18 Feb 2022 11:15 )             27.2     02-17    139  |  109<H>  |  24<H>  ----------------------------<  143<H>  4.3   |  25  |  1.02    Ca    8.5      17 Feb 2022 06:23             Cultures:       Culture - Blood (collected 02-14-22 @ 06:52)  Source: .Blood None  Preliminary Report (02-15-22 @ 13:02):    No growth to date.    Culture - Blood (collected 02-13-22 @ 23:37)  Source: .Blood None  Preliminary Report (02-15-22 @ 09:01):    No growth to date.    Culture - Urine (collected 02-13-22 @ 23:37)  Source: Clean Catch None  Final Report (02-15-22 @ 07:51):    No growth                  < from: CT Angio Chest PE Protocol w/ IV Cont (02.13.22 @ 23:16) >    ACC: [id number] EXAM:  CT ANGIO CHEST PULM ART [facility]                          PROCEDURE DATE:  02/13/2022          INTERPRETATION:  CLINICAL INFORMATION: Lung cancer, hypoxia, evaluate for   pulmonary embolism    COMPARISON: Chest x-ray 2/13/2022. CTabdomen pelvis 9/12/2014    CONTRAST/COMPLICATIONS:  IV Contrast: Omnipaque 350  90 cc administered   10 cc discarded  Oral Contrast: NONE  Complications: None reported at time of study completion    PROCEDURE:  CT Angiography of the Chest.  [place] coronal reformats were performed as well as 3D (MIP)   reconstructions.    FINDINGS:    LUNGS AND AIRWAYS: Patent central airways.  Right upper lobe postsurgical   changes. Mosaic attenuation pattern to the bilateral upper lobes which   may be related to edema or air trapping or small airways disease. Patchy   opacities in the left lower lobe and to a lesser extent in the right   lower lobe which may represent infection. Correlate clinically. Follow   these findings to resolution to exclude other processes.  PLEURA: Trace bilateral pleural fluid.  MEDIASTINUM AND KATELYN: No lymphadenopathy.  VESSELS: No pulmonary embolism.  HEART: Cardiomegaly. Coronary artery calcifications. No pericardial   effusion.  CHEST WALL AND LOWER NECK: Within normal limits.  VISUALIZED UPPER ABDOMEN: Within normal limits.  BONES: Sternotomy. Degenerative changes.    IMPRESSION:    No pulmonary embolism.    Mosaic attenuation pattern to the bilateral upper lobes which may be   related to edema or air trapping or small airways disease. Patchy   opacities in the left lower lobe and to a lesser extent in the right   lower lobe which may represent infection. Correlate clinically. Follow   these findings to resolution to exclude other processes.    < end of copied text >        Radiology: all available radiological tests reviewed    Advanced directives addressed: full resuscitation

## 2022-02-18 NOTE — PROGRESS NOTE ADULT - SUBJECTIVE AND OBJECTIVE BOX
85 y/o male with a PMHx of polycythemia vera, prostate CA, rheumatic fever, CAD s/p CABG,  Afib s/p PPM , HTN, ? Prostate CA  presents to the ED c/o generalized weakness, and  SOB. Pt received chemotherapy 3 To 4 weeks back for his lung cancer, and subsequent scheduled chemotherapy is in one week.     Subjective: Patient seen and examined at bedside. Patient reports weakness, sob.  Denies cp, abd pain , ha, n/v.     Review of Systems  CONSTITUTIONAL: no fever or chill, +tired   EYES/ENT: No visual changes;  No vertigo or throat pain   NECK: No pain or stiffness  RESPIRATORY: No cough +sob, wheezing, hemoptysis  CARDIOVASCULAR: No chest pain or palpitations  GASTROINTESTINAL: No abdominal or epigastric pain. No nausea, vomiting, or hematemesis; No diarrhea or constipation. No melena or hematochezia.  GENITOURINARY: No dysuria, frequency or hematuria  NEUROLOGICAL: No numbness or weakness  SKIN: No itching, rashes    Physical Exam   GENERAL: NAD, Lying in the bed comfortably  HEAD:  Atraumatic, Normocephalic  EYES: EOMI, PERRLA, conjunctiva and sclera clear  ENT: Moist mucous membranes  NECK: Supple, No JVD  CHEST/LUNG: No crackles. No wheezing or rubs. Unlabored respirations.   HEART: RRR.  No murmurs, rubs, or gallops  ABDOMEN: Bowel sounds present; Soft, Nontender, Nondistended. No hepatomegaly  EXTREMITIES:  2+ Peripheral Pulses  NERVOUS SYSTEM:  Alert & Oriented X3, speech clear. No deficits   MSK: FROM all 4 extremities, full and equal strength  SKIN: Ecchymosis noted on the upper extremities bilaterally      PHYSICAL EXAM:  Vital Signs Last 24 Hrs  T(C): 36.8 (18 Feb 2022 08:23), Max: 36.8 (18 Feb 2022 08:23)  T(F): 98.3 (18 Feb 2022 08:23), Max: 98.3 (18 Feb 2022 08:23)  HR: 72 (18 Feb 2022 08:23) (71 - 80)  BP: 125/63 (18 Feb 2022 08:23) (110/62 - 151/60)  BP(mean): 73 (17 Feb 2022 16:00) (73 - 73)  RR: 18 (18 Feb 2022 08:23) (14 - 18)  SpO2: 96% (18 Feb 2022 08:23) (96% - 99%)        MEDICATIONS  (STANDING):  atorvastatin 40 milliGRAM(s) Oral at bedtime  cefepime  Injectable. 1000 milliGRAM(s) IV Push every 8 hours  digoxin     Tablet 250 MICROGram(s) Oral daily  metoprolol tartrate 25 milliGRAM(s) Oral two times a day  pantoprazole    Tablet 40 milliGRAM(s) Oral two times a day  potassium chloride    Tablet ER 10 milliEquivalent(s) Oral daily        LABS: All Labs Reviewed:                                                                    8.8    6.75  )-----------( 216      ( 18 Feb 2022 11:15 )             27.2     02-17    139  |  109<H>  |  24<H>  ----------------------------<  143<H>  4.3   |  25  |  1.02    Ca    8.5      17 Feb 2022 06:23          TPro  5.5<L>  /  Alb  1.8<L>  /  TBili  1.0  /  DBili  0.4<H>  /  AST  35  /  ALT  33  /  AlkPhos  57  02-15          I&O's Summary    14 Feb 2022 07:01  -  14 Feb 2022 18:24  --------------------------------------------------------  IN: 829 mL / OUT: 875 mL / NET: -46 mL          Radiology:    < from: CT Angio Chest PE Protocol w/ IV Cont (02.13.22 @ 23:16) >  IMPRESSION:    No pulmonary embolism.    Mosaic attenuation pattern to the bilateral upper lobes which may be   related to edema or air trapping or small airways disease. Patchy   opacities in the left lower lobe and to a lesser extent in the right   lower lobe which may represent infection. Correlate clinically. Follow   these findings to resolution to exclude other processes.    < end of copied text >

## 2022-02-18 NOTE — PROGRESS NOTE ADULT - SUBJECTIVE AND OBJECTIVE BOX
INTERVAL HPI/OVERNIGHT EVENTS:  Patient S&E at bedside. No o/n events, eating breakfast at bedside this morning, no further bleeding or dark stools. Hb stable at 8.8 this morning, plt 216k.  Remains on NC.     PAST MEDICAL & SURGICAL HISTORY:  Atrial fibrillation    Hypertension    CAD (coronary artery disease)  stent in 2007    Pacemaker    Rheumatic fever  child traylor    Prostate ca  radiation treatment 5yrs ago    Symptomatic bradycardia    Polycythemia vera    Artificial cardiac pacemaker  2006    History of PTCA  with stent to RCA 2007        FAMILY HISTORY:  Family history of bronchiectasis (Mother)        VITAL SIGNS:  T(F): 98.3 (02-18-22 @ 08:23)  HR: 72 (02-18-22 @ 08:23)  BP: 125/63 (02-18-22 @ 08:23)  RR: 18 (02-18-22 @ 08:23)  SpO2: 96% (02-18-22 @ 08:23)  Wt(kg): --    PHYSICAL EXAM:    Constitutional: NAD, on nc eating breakfast  Respiratory: rhonchi  Cardiovascular: irregular rhythm  Gastrointestinal: soft, NTND,  Extremities: no c/c/e  Neurological: AAOx3      MEDICATIONS  (STANDING):  atorvastatin 40 milliGRAM(s) Oral at bedtime  cefepime  Injectable. 1000 milliGRAM(s) IV Push every 8 hours  digoxin     Tablet 250 MICROGram(s) Oral daily  metoprolol tartrate 25 milliGRAM(s) Oral two times a day  pantoprazole    Tablet 40 milliGRAM(s) Oral two times a day  potassium chloride    Tablet ER 10 milliEquivalent(s) Oral daily    MEDICATIONS  (PRN):  acetaminophen     Tablet .. 650 milliGRAM(s) Oral every 6 hours PRN Temp greater or equal to 38.5C (101.3F), Moderate Pain (4 - 6)      Allergies    No Known Allergies    Intolerances        LABS:                        8.8    6.75  )-----------( 216      ( 18 Feb 2022 11:15 )             27.2     02-17    139  |  109<H>  |  24<H>  ----------------------------<  143<H>  4.3   |  25  |  1.02    Ca    8.5      17 Feb 2022 06:23            RADIOLOGY & ADDITIONAL TESTS:  Studies reviewed.

## 2022-02-18 NOTE — PROGRESS NOTE ADULT - SUBJECTIVE AND OBJECTIVE BOX
CHIEF COMPLAINT:  Patient is a 86y old  Male who presents with a chief complaint of acute hypoxic respiratory failure  PNA (15 Feb 2022 16:25)      HPI: 2/15/22:  85 y/o male known to me with a PMHx of polycythemia vera, prostate CA, rheumatic fever, CAD s/p CABG,  chronic Afib s/p PPM , HTN, ? Prostate CA  presents to the ED c/o generalized weakness, and  SOB. The pt has been feeling extreme weakness and SOB associated with fever for the past four days; hence, his wife advised him to visit [facility] for further evaluation. He claims that his weakness was so severe that he could not get up from his chair or walk properly. In addition, his PO intake decreased drastically . Pt denied chest pain, nausea, vomiting, loc, history of fall, diarrhea, and CT bleeding.   Pt received chemotherapy 3 To 4 weeks back for his lung cancer, and subsequent scheduled chemotherapy is in one week. Then, last October, he was admitted to [facility] for GI bleeding.   Currently he denies anginal chest pains, palpitations, dizziness or syncope.  He had an echo today showing normal LV systolic function and LVEF approx. 55-60% with moderately dilated LA and mild MR, TR, AI and PI.  He has no new cardiac symptoms otherwise.    2/16/22:  Less SOB.  No anginal chest pains or other cardiac changes.  A-V pacing on the monitor.    2/17/22:  Slowly improving.  Remains afebrile.  H&H low but no obvious bleeding.  A-v pacing on the monitor.  No new cardiac symptoms.    2/18/22:  Feeling good.  A-V pacing on the monitor. No new cardiac symptoms.  Was scheduled for chemoRx at [place] next week but this was cancelled due to his PNA.        PMHx:  PAST MEDICAL & SURGICAL HISTORY:  Atrial fibrillation  Hypertension  CAD (coronary artery disease)-stent in 2007  CABG  Pacemaker  Rheumatic fever-child traylor  Prostate ca-radiation treatment 5yrs ago  Symptomatic bradycardia  Polycythemia vera  Artificial cardiac pacemaker-2006  History of PTCA-with stent to RCA 2007      FAMILY HISTORY:   FAMILY HISTORY:-  Family history of bronchiectasis (Mother)      ALLERGIES:  Allergies  No Known Allergies      REVIEW OF SYSTEMS:  10 point ROS was obtained  Pertinent positives and negatives are as above  All other review of systems is negative unless indicated above      ICU Vital Signs Last 24 Hrs  T(C): 36.3 (17 Feb 2022 22:15), Max: 36.4 (17 Feb 2022 17:17)  T(F): 97.3 (17 Feb 2022 22:15), Max: 97.6 (17 Feb 2022 17:17)  HR: 80 (17 Feb 2022 22:15) (70 - 80)  BP: 151/60 (17 Feb 2022 22:15) (91/44 - 151/60)  BP(mean): 73 (17 Feb 2022 16:00) (55 - 73)  RR: 18 (17 Feb 2022 22:15) (14 - 22)  SpO2: 98% (17 Feb 2022 22:15) (94% - 99%)      I&O's Summary    14 Feb 2022 07:01  -  15 Feb 2022 07:00  --------------------------------------------------------  IN: 829 mL / OUT: 1975 mL / NET: -1146 mL    15 Feb 2022 07:01  -  15 Feb 2022 20:06  --------------------------------------------------------  IN: 60.7 mL / OUT: 0 mL / NET: 60.7 mL        PHYSICAL EXAM:   Constitutional: NAD, awake and alert, well-developed  HEENT: PERR, EOMI, Normal Hearing, MMM  Neck: Soft and supple, No LAD, No JVD  Respiratory: Breath sounds are clear bilaterally, No wheezing, rales or rhonchi, PPM in left chest wall  Cardiovascular: S1 and S2, irregular rate and irregular rhythm, soft ANA M at LLSB and base as before, no gallops or rubs  Gastrointestinal: Bowel Sounds present, soft, nontender, nondistended, no guarding, no rebound  Extremities: No peripheral edema  Vascular: 2+ peripheral pulses  Neurological: A/O x 3, no focal deficits  Musculoskeletal: 5/5 strength b/l upper and lower extremities      MEDICATIONS  (STANDING):  atorvastatin 40 milliGRAM(s) Oral at bedtime  cefepime  Injectable. 1000 milliGRAM(s) IV Push every 8 hours  digoxin     Tablet 250 MICROGram(s) Oral daily  metoprolol tartrate 25 milliGRAM(s) Oral two times a day  pantoprazole    Tablet 40 milliGRAM(s) Oral two times a day  potassium chloride    Tablet ER 10 milliEquivalent(s) Oral daily    MEDICATIONS  (PRN):  acetaminophen     Tablet .. 650 milliGRAM(s) Oral every 6 hours PRN Temp greater or equal to 38.5C (101.3F), Moderate Pain (4 - 6)      LABS: All Labs Reviewed:                        6.8    6.15  )-----------( 148      ( 17 Feb 2022 06:23 )             20.8                           7.9    4.49  )-----------( 96       ( 15 Feb 2022 06:59 )             23.9       02-17    139  |  109<H>  |  24<H>  ----------------------------<  143<H>  4.3   |  25  |  1.02    Ca    8.5      17 Feb 2022 06:23      02-16    135  |  104  |  23  ----------------------------<  167<H>  3.8   |  23  |  0.88    Ca    8.2<L>      16 Feb 2022 06:51    TPro  5.5<L>  /  Alb  1.8<L>  /  TBili  1.0  /  DBili  0.4<H>  /  AST  35  /  ALT  33  /  AlkPhos  57  02-15      02-15    135  |  105  |  17  ----------------------------<  161<H>  3.6   |  22  |  0.73    Ca    8.0<L>      15 Feb 2022 06:59    TPro  5.5<L>  /  Alb  1.8<L>  /  TBili  1.0  /  DBili  0.4<H>  /  AST  35  /  ALT  33  /  AlkPhos  57  02-15    PT/INR - ( 15 Feb 2022 06:59 )   PT: 18.3 sec;   INR: 1.61 ratio       PTT - ( 13 Feb 2022 21:55 )  PTT:33.8 sec    Troponin I, High Sensitivity (02.13.22 @ 21:55): 91.23    Serum Pro-Brain Natriuretic Peptide: 4288 pg/mL (02-15 @ 07:08)  Serum Pro-Brain Natriuretic Peptide: 5332 pg/mL (02-13 @ 21:55)    Digoxin Level, Serum (02.15.22 @ 06:59): .86 ng/mL     CULTURES:   2/13/22 & 2/14/22:  Organism --  Gram Stain Blood -- Gram Stain --  Specimen Source .Blood None  Culture-Blood --No growth to date.     2/13/22:  Organism --  Gram Stain Urine -- Gram Stain --  Specimen Source Clean Catch None  Culture-Urine --No growth       LIPID PROFILE     RADIOLOGY:    CXR: 2/13/22:  FINDINGS:  CATHETERS AND TUBES: None  PULMONARY: The visualized lungs are clear of airspace consolidations or effusions. No pneumothorax.  HEART/VASCULAR: The  heart is mildly enlarged in transverse diameter. Status postcoronary artery bypass graft procedure.Cardiac device wire leads are within right atrium and right ventricle. .  BONES: Visualized osseous structures are intact.  IMPRESSION:   No radiographic evidence of active chest disease.      CTA of Chest: 2/13/22:  FINDINGS:  LUNGS AND AIRWAYS: Patent central airways.  Right upper lobe postsurgical changes. Mosaic attenuation pattern to the bilateral upper lobes which may be related to edema or air trapping or small airways disease. Patchy opacities in the left lower lobe and to a lesser extent in the right lower lobe which may represent infection. Correlate clinically. Follow these findings to resolution to exclude other processes.  PLEURA: Trace bilateral pleural fluid.  MEDIASTINUM AND KATELYN: No lymphadenopathy.  VESSELS: No pulmonary embolism.  HEART: Cardiomegaly. Coronary artery calcifications. No pericardial effusion.  CHEST WALL AND LOWER NECK: Within normal limits.  VISUALIZED UPPER ABDOMEN: Within normal limits.  BONES: Sternotomy. Degenerative changes.  IMPRESSION:  No pulmonary embolism.  Mosaic attenuation pattern to the bilateral upper lobes which may be related to edema or air trapping or small airways disease. Patchy opacities in the left lower lobe and to a lesser extent in the right lower lobe which may represent infection. Correlate clinically. Follow these findings to resolution to exclude other processes.      EKG:      TELEMETRY:  A-V pacing with underlying AF    ECHO:  2/15/22:  M-Mode Measurements (cm):   LVEDd: 5.66 cm            LVESd: 4.04 cm   IVSEd: 0.85 cm   LVPWd: 0.97 cm            AO Root Dimension: 3.8 cm                       ACS: 2.1 cm                             LA: 5.3 cm                             LVOT: 2.2 cm  Doppler Measurements:   AV Velocity:195 cm/s                MV Peak E-Wave: 68.6 cm/s   AV Peak Gradient: 15.21 mmHg        MV Peak A-Wave: 52.3 cm/s                                       MV E/A Ratio: 1.31 %   TR Velocity:305 cm/s                MV Peak Gradient: 1.88 mmHg   TR Gradient:37.21 mmHg   Estimated RAP:5 mmHg   RVSP:42 mmHg    Findings:  Mitral Valve:   Fibrocalcific changes noted to the mitral valve leaflets with preserved leaflet excursion.   Mild mitral regurgitation is present.    Aortic Valve:   Fibrocalcific changes noted to the Aortic valve leaflets with mildly reduced mobility but does not meet criteria for aortic stenosis.   Trace to mild aortic regurgitation is present.    Tricuspid Valve:   Normal appearing tricuspid valve structure.   Mild (1+) tricuspid valve regurgitation is present.   Mild pulmonary hypertension.    Pulmonic Valve:   Normal appearing pulmonic valve structure and function.    Left Atrium:   The left atrium is moderately dilated.    Left Ventricle:   The left ventricle is normal in size, wall thickness, wall motion and contractility.   Estimated left ventricular ejection fraction is 55-60 %.    Right Atrium:   Normal appearing right atrium.    Right Ventricle:   Normal appearing right ventricle structure and function.   A device wire is seen in the RV and RA.    Pericardial Effusion:   No evidence of pericardial effusion.    Miscellaneous:   IVC was not visualized.   No subcostal window seen.    Summary:   The left ventricle is normal in size, wall thickness, wall motion and contractility.   Estimated left ventricular ejection fraction is 55-60 %.   The left atrium is moderately dilated.   Normal appearing right atrium.   Normal appearing right ventricle structure and function.   A device wire is seen in the RV and RA.   Fibrocalcific changes noted to the Aortic valve leaflets with mildly reduced mobility but does not meet criteria for aortic stenosis.   Trace to mild aortic regurgitation is present.   Normal appearing tricuspid valve structure.   Mild (1+) tricuspid valve regurgitation is present.   Mild pulmonary hypertension.   No evidence of pericardial effusion.    Signature:   ----------------------------------------------------------------   Electronically signed by Kory Ibarra MD([facility] physician)   on 02/15/2022 06:19 PM   ----------------------------------------------------------------

## 2022-02-19 LAB
ALBUMIN SERPL ELPH-MCNC: 2.3 G/DL — LOW (ref 3.3–5)
ALP SERPL-CCNC: 74 U/L — SIGNIFICANT CHANGE UP (ref 40–120)
ALT FLD-CCNC: 61 U/L — SIGNIFICANT CHANGE UP (ref 12–78)
ANION GAP SERPL CALC-SCNC: 7 MMOL/L — SIGNIFICANT CHANGE UP (ref 5–17)
AST SERPL-CCNC: 50 U/L — HIGH (ref 15–37)
BASOPHILS # BLD AUTO: 0 K/UL — SIGNIFICANT CHANGE UP (ref 0–0.2)
BASOPHILS NFR BLD AUTO: 0 % — SIGNIFICANT CHANGE UP (ref 0–2)
BILIRUB SERPL-MCNC: 1.4 MG/DL — HIGH (ref 0.2–1.2)
BUN SERPL-MCNC: 15 MG/DL — SIGNIFICANT CHANGE UP (ref 7–23)
CALCIUM SERPL-MCNC: 8.6 MG/DL — SIGNIFICANT CHANGE UP (ref 8.5–10.1)
CHLORIDE SERPL-SCNC: 102 MMOL/L — SIGNIFICANT CHANGE UP (ref 96–108)
CO2 SERPL-SCNC: 25 MMOL/L — SIGNIFICANT CHANGE UP (ref 22–31)
CREAT SERPL-MCNC: 0.9 MG/DL — SIGNIFICANT CHANGE UP (ref 0.5–1.3)
CULTURE RESULTS: SIGNIFICANT CHANGE UP
CULTURE RESULTS: SIGNIFICANT CHANGE UP
DIGOXIN SERPL-MCNC: 1.42 NG/ML — SIGNIFICANT CHANGE UP (ref 0.8–2)
EOSINOPHIL # BLD AUTO: 0 K/UL — SIGNIFICANT CHANGE UP (ref 0–0.5)
EOSINOPHIL NFR BLD AUTO: 0 % — SIGNIFICANT CHANGE UP (ref 0–6)
GLUCOSE SERPL-MCNC: 96 MG/DL — SIGNIFICANT CHANGE UP (ref 70–99)
HCT VFR BLD CALC: 31.3 % — LOW (ref 39–50)
HGB BLD-MCNC: 10.1 G/DL — LOW (ref 13–17)
LACTATE SERPL-SCNC: 1.4 MMOL/L — SIGNIFICANT CHANGE UP (ref 0.7–2)
LACTATE SERPL-SCNC: 3.1 MMOL/L — HIGH (ref 0.7–2)
LYMPHOCYTES # BLD AUTO: 0.2 K/UL — LOW (ref 1–3.3)
LYMPHOCYTES # BLD AUTO: 3 % — LOW (ref 13–44)
MAGNESIUM SERPL-MCNC: 2.3 MG/DL — SIGNIFICANT CHANGE UP (ref 1.6–2.6)
MCHC RBC-ENTMCNC: 32.3 GM/DL — SIGNIFICANT CHANGE UP (ref 32–36)
MCHC RBC-ENTMCNC: 32.8 PG — SIGNIFICANT CHANGE UP (ref 27–34)
MCV RBC AUTO: 101.6 FL — HIGH (ref 80–100)
MONOCYTES # BLD AUTO: 0.26 K/UL — SIGNIFICANT CHANGE UP (ref 0–0.9)
MONOCYTES NFR BLD AUTO: 4 % — SIGNIFICANT CHANGE UP (ref 2–14)
NEUTROPHILS # BLD AUTO: 5.98 K/UL — SIGNIFICANT CHANGE UP (ref 1.8–7.4)
NEUTROPHILS NFR BLD AUTO: 91 % — HIGH (ref 43–77)
NRBC # BLD: SIGNIFICANT CHANGE UP /100 WBCS (ref 0–0)
PHOSPHATE SERPL-MCNC: 2.6 MG/DL — SIGNIFICANT CHANGE UP (ref 2.5–4.5)
PLATELET # BLD AUTO: 249 K/UL — SIGNIFICANT CHANGE UP (ref 150–400)
POTASSIUM SERPL-MCNC: 4.8 MMOL/L — SIGNIFICANT CHANGE UP (ref 3.5–5.3)
POTASSIUM SERPL-SCNC: 4.8 MMOL/L — SIGNIFICANT CHANGE UP (ref 3.5–5.3)
PROT SERPL-MCNC: 6.3 GM/DL — SIGNIFICANT CHANGE UP (ref 6–8.3)
RBC # BLD: 3.08 M/UL — LOW (ref 4.2–5.8)
RBC # FLD: 20.9 % — HIGH (ref 10.3–14.5)
SODIUM SERPL-SCNC: 134 MMOL/L — LOW (ref 135–145)
SPECIMEN SOURCE: SIGNIFICANT CHANGE UP
SPECIMEN SOURCE: SIGNIFICANT CHANGE UP
WBC # BLD: 6.57 K/UL — SIGNIFICANT CHANGE UP (ref 3.8–10.5)
WBC # FLD AUTO: 6.57 K/UL — SIGNIFICANT CHANGE UP (ref 3.8–10.5)

## 2022-02-19 PROCEDURE — 71045 X-RAY EXAM CHEST 1 VIEW: CPT | Mod: 26

## 2022-02-19 PROCEDURE — 99233 SBSQ HOSP IP/OBS HIGH 50: CPT

## 2022-02-19 RX ORDER — APIXABAN 2.5 MG/1
5 TABLET, FILM COATED ORAL EVERY 12 HOURS
Refills: 0 | Status: DISCONTINUED | OUTPATIENT
Start: 2022-02-19 | End: 2022-02-25

## 2022-02-19 RX ORDER — SODIUM CHLORIDE 9 MG/ML
2000 INJECTION INTRAMUSCULAR; INTRAVENOUS; SUBCUTANEOUS ONCE
Refills: 0 | Status: COMPLETED | OUTPATIENT
Start: 2022-02-19 | End: 2022-02-19

## 2022-02-19 RX ORDER — VANCOMYCIN HCL 1 G
1500 VIAL (EA) INTRAVENOUS ONCE
Refills: 0 | Status: COMPLETED | OUTPATIENT
Start: 2022-02-19 | End: 2022-02-19

## 2022-02-19 RX ORDER — CEFEPIME 1 G/1
1000 INJECTION, POWDER, FOR SOLUTION INTRAMUSCULAR; INTRAVENOUS EVERY 8 HOURS
Refills: 0 | Status: DISCONTINUED | OUTPATIENT
Start: 2022-02-19 | End: 2022-02-20

## 2022-02-19 RX ORDER — FUROSEMIDE 40 MG
40 TABLET ORAL ONCE
Refills: 0 | Status: COMPLETED | OUTPATIENT
Start: 2022-02-19 | End: 2022-02-19

## 2022-02-19 RX ADMIN — ATORVASTATIN CALCIUM 40 MILLIGRAM(S): 80 TABLET, FILM COATED ORAL at 21:30

## 2022-02-19 RX ADMIN — Medication 650 MILLIGRAM(S): at 14:40

## 2022-02-19 RX ADMIN — Medication 25 MILLIGRAM(S): at 21:30

## 2022-02-19 RX ADMIN — Medication 250 MICROGRAM(S): at 08:26

## 2022-02-19 RX ADMIN — Medication 300 MILLIGRAM(S): at 08:25

## 2022-02-19 RX ADMIN — APIXABAN 5 MILLIGRAM(S): 2.5 TABLET, FILM COATED ORAL at 21:30

## 2022-02-19 RX ADMIN — SODIUM CHLORIDE 2000 MILLILITER(S): 9 INJECTION INTRAMUSCULAR; INTRAVENOUS; SUBCUTANEOUS at 08:01

## 2022-02-19 RX ADMIN — CEFEPIME 1000 MILLIGRAM(S): 1 INJECTION, POWDER, FOR SOLUTION INTRAMUSCULAR; INTRAVENOUS at 13:39

## 2022-02-19 RX ADMIN — PANTOPRAZOLE SODIUM 40 MILLIGRAM(S): 20 TABLET, DELAYED RELEASE ORAL at 08:26

## 2022-02-19 RX ADMIN — CEFEPIME 1000 MILLIGRAM(S): 1 INJECTION, POWDER, FOR SOLUTION INTRAMUSCULAR; INTRAVENOUS at 21:30

## 2022-02-19 RX ADMIN — PANTOPRAZOLE SODIUM 40 MILLIGRAM(S): 20 TABLET, DELAYED RELEASE ORAL at 21:30

## 2022-02-19 RX ADMIN — Medication 40 MILLIGRAM(S): at 07:01

## 2022-02-19 RX ADMIN — Medication 650 MILLIGRAM(S): at 16:11

## 2022-02-19 RX ADMIN — Medication 10 MILLIEQUIVALENT(S): at 08:26

## 2022-02-19 RX ADMIN — Medication 25 MILLIGRAM(S): at 08:26

## 2022-02-19 RX ADMIN — Medication 650 MILLIGRAM(S): at 06:01

## 2022-02-19 NOTE — PROGRESS NOTE ADULT - ASSESSMENT
87 y/o male with a PMHx of polycythemia vera, prostate CA, rheumatic fever, CAD s/p CABG,  Afib s/p PPM , HTN, ? Prostate CA  presents to the ED c/o generalized weakness, and  SOB.     # Severe sepsis and acute respiratory failure 2/2 pneumonia in Lung Ca pt   -pt with fever overnight   -lactate 3.1->2L IVF->1.4  -repeat Bcx and UCx   -CXR  -cefepime restarted day #6  -not responding to abx?non infectious cause?pneumonitis 2/2 chemo?  -pulmonology consult-Dr Montemayor     # Chronic CHF  -stable, no sxs of overt CHF  -BNP 5332  -TTE: EF 55-60%, mod LA dilation       #Elevated troponin  - Type 2 MI  -Probably 2/2 to demand ischemia     #Acute Blood Loss Macrocytic anemia   -B12 mildly elevated, folate wnl   -s/p 1 U PRBC 2/17 after report of bloody stool, Hgb drop to 6.8  -GI consult appreciated: PPI BID x 8 weeks, possible EGD later as outpatient   -Hgb stable today   -will monitor     # Hyponatremia  -mild, received IVF, will monitor     #Cad, s/p CABG   -cont bb, Lasix   - recent cath negative    #Chronic Afib   -AV pacing on monitor   -cont Digoxin, level 1.42 on 2/19  -cont  lopressor 25 mg BID   -Pt is not taking amiodarone due to toxicity  -[id number]: 5  -Hgb continues stable, will restart eliquis and continue to monitor     #DVT prophylaxis   -eliquis     #GOC  -DNR/DNI  -MOLST form signed and in chart         d/w Dr Fuentes

## 2022-02-19 NOTE — PROGRESS NOTE ADULT - SUBJECTIVE AND OBJECTIVE BOX
INTERVAL HPI/OVERNIGHT EVENTS:  Patient S&E at bedside. o/n events m- spiked temp 101.6, patient resting comfortably. No complaints at this time.    VITAL SIGNS:  Vital Signs Last 24 Hrs  T(C): 37.8 (19 Feb 2022 10:20), Max: 38.7 (19 Feb 2022 05:25)  T(F): 100 (19 Feb 2022 10:20), Max: 101.6 (19 Feb 2022 05:25)  HR: 74 (19 Feb 2022 08:15) (69 - 75)  BP: 113/61 (19 Feb 2022 08:15) (104/42 - 123/53)  BP(mean): --  RR: 18 (19 Feb 2022 08:15) (18 - 22)  SpO2: 94% (19 Feb 2022 08:15) (94% - 96%)    PHYSICAL EXAM:    Constitutional: NAD  Eyes: EOMI, sclera non-icteric  Neck: supple, no masses, no JVD  Respiratory:  LEFT rhonchi and wheezing  Cardiovascular: RRR, no M/R/G  Gastrointestinal: soft, NTND, no masses palpable, + BS, no hepatosplenomegaly  Extremities: no c/c/e  Neurological: AAOx3      MEDICATIONS  (STANDING):  apixaban 5 milliGRAM(s) Oral every 12 hours  atorvastatin 40 milliGRAM(s) Oral at bedtime  cefepime  Injectable. 1000 milliGRAM(s) IV Push every 8 hours  digoxin     Tablet 250 MICROGram(s) Oral daily  metoprolol tartrate 25 milliGRAM(s) Oral two times a day  pantoprazole    Tablet 40 milliGRAM(s) Oral two times a day  potassium chloride    Tablet ER 10 milliEquivalent(s) Oral daily    MEDICATIONS  (PRN):  acetaminophen     Tablet .. 650 milliGRAM(s) Oral every 6 hours PRN Temp greater or equal to 38.5C (101.3F), Moderate Pain (4 - 6)      Allergies    No Known Allergies    Intolerances        LABS:                        10.1   6.57  )-----------( 249      ( 19 Feb 2022 06:41 )             31.3     02-19    134<L>  |  102  |  15  ----------------------------<  96  4.8   |  25  |  0.90    Ca    8.6      19 Feb 2022 06:41  Phos  2.6     02-19  Mg     2.3     02-19    TPro  6.3  /  Alb  2.3<L>  /  TBili  1.4<H>  /  DBili  x   /  AST  50<H>  /  ALT  61  /  AlkPhos  74  02-19          RADIOLOGY & ADDITIONAL TESTS:  Studies reviewed.    ASSESSMENT & PLAN:

## 2022-02-19 NOTE — PROGRESS NOTE ADULT - ASSESSMENT
87 y/o male with a PMHx of polycythemia vera, prostate CA, rheumatic fever, CAD s/p CABG,  Afib s/p PPM , HTN, admitted on 2/14  c/o generalized weakness, and  SOB. The pt has been feeling extreme weakness and SOB associated with fever for the past four days; associated with inability to stand, walk. The patient was recently on chemotherapy for lung cancer and about 6 months ago had a GI bleed. Patient is poor historian and history per medical record. Is requiring small doses of pressors since admission.     1. Patient admitted with pneumonia, patient at risk for gram negative rods and other resistant bacteria and is on pressor support  - follow up cultures   - serial cbc and monitor temperature   - iv hydration and supportive care   - oxygen and nebs as needed   - reviewed prior medical records to evaluate for resistant or atypical pathogens   - will continue cefepime as ordered which will cover gram negative rods including Pseudomonas, day #6  - after "stopping antibiotics" patient had more fever, though the cefepime was not even out of his system; cefepime has been restarted and patient received one dose of vancomycin  - unclear source of fever, the workup is in progress, may not even be an infection, to be determined  2. other issues: per medicine

## 2022-02-19 NOTE — PROGRESS NOTE ADULT - SUBJECTIVE AND OBJECTIVE BOX
Date of service: 02-19-22 @ 12:29      Patient lying in bed; had fever to 101 at 5 am, then 100 around 10 am; no diarrhea, no cough, though is wearing oxygen      ROS unable to obtain secondary to patient medical condition     MEDICATIONS  (STANDING):  apixaban 5 milliGRAM(s) Oral every 12 hours  atorvastatin 40 milliGRAM(s) Oral at bedtime  cefepime  Injectable. 1000 milliGRAM(s) IV Push every 8 hours  digoxin     Tablet 250 MICROGram(s) Oral daily  metoprolol tartrate 25 milliGRAM(s) Oral two times a day  pantoprazole    Tablet 40 milliGRAM(s) Oral two times a day  potassium chloride    Tablet ER 10 milliEquivalent(s) Oral daily    MEDICATIONS  (PRN):  acetaminophen     Tablet .. 650 milliGRAM(s) Oral every 6 hours PRN Temp greater or equal to 38.5C (101.3F), Moderate Pain (4 - 6)      Vital Signs Last 24 Hrs  T(C): 37.8 (19 Feb 2022 10:20), Max: 38.7 (19 Feb 2022 05:25)  T(F): 100 (19 Feb 2022 10:20), Max: 101.6 (19 Feb 2022 05:25)  HR: 74 (19 Feb 2022 08:15) (69 - 75)  BP: 113/61 (19 Feb 2022 08:15) (104/42 - 123/53)  BP(mean): --  RR: 18 (19 Feb 2022 08:15) (18 - 22)  SpO2: 94% (19 Feb 2022 08:15) (94% - 96%)        Physical Exam:          Constitutional: frail looking  HEENT: NC/AT, EOMI, PERRLA, conjunctivae clear; ears and nose atraumatic; pharynx clear  Neck: supple; thyroid not palpable  Back: no tenderness  Respiratory: respiratory effort normal; scattered coarse breath sounds, improving  Cardiovascular: S1S2 regular, no murmurs  Abdomen: soft, not tender, not distended, positive BS; no liver or spleen organomegaly  Genitourinary: no suprapubic tenderness  Musculoskeletal: no muscle tenderness, no joint swelling or tenderness  Neurological/ Psychiatric:  moving all extremities  Skin: no rashes; no palpable lesions; multiple ecchymosis    Labs: all available labs reviewed                         Labs:                       Labs:                        10.1   6.57  )-----------( 249      ( 19 Feb 2022 06:41 )             31.3     02-19    134<L>  |  102  |  15  ----------------------------<  96  4.8   |  25  |  0.90    Ca    8.6      19 Feb 2022 06:41  Phos  2.6     02-19  Mg     2.3     02-19    TPro  6.3  /  Alb  2.3<L>  /  TBili  1.4<H>  /  DBili  x   /  AST  50<H>  /  ALT  61  /  AlkPhos  74  02-19           Cultures:       Culture - Blood (collected 02-14-22 @ 06:52)  Source: .Blood None  Preliminary Report (02-15-22 @ 13:02):    No growth to date.    Culture - Blood (collected 02-13-22 @ 23:37)  Source: .Blood None  Final Report (02-19-22 @ 09:01):    No Growth Final    Culture - Urine (collected 02-13-22 @ 23:37)  Source: Clean Catch None  Final Report (02-15-22 @ 07:51):    No growth                  < from: CT Angio Chest PE Protocol w/ IV Cont (02.13.22 @ 23:16) >    ACC: [id number] EXAM:  CT ANGIO CHEST PULM [facility]                          PROCEDURE DATE:  02/13/2022          INTERPRETATION:  CLINICAL INFORMATION: Lung cancer, hypoxia, evaluate for   pulmonary embolism    COMPARISON: Chest x-ray 2/13/2022. CTabdomen pelvis 9/12/2014    CONTRAST/COMPLICATIONS:  IV Contrast: Omnipaque 350  90 cc administered   10 cc discarded  Oral Contrast: NONE  Complications: None reported at time of study completion    PROCEDURE:  CT Angiography of the Chest.  [place] coronal reformats were performed as well as 3D (MIP)   reconstructions.    FINDINGS:    LUNGS AND AIRWAYS: Patent central airways.  Right upper lobe postsurgical   changes. Mosaic attenuation pattern to the bilateral upper lobes which   may be related to edema or air trapping or small airways disease. Patchy   opacities in the left lower lobe and to a lesser extent in the right   lower lobe which may represent infection. Correlate clinically. Follow   these findings to resolution to exclude other processes.  PLEURA: Trace bilateral pleural fluid.  MEDIASTINUM AND KATELYN: No lymphadenopathy.  VESSELS: No pulmonary embolism.  HEART: Cardiomegaly. Coronary artery calcifications. No pericardial   effusion.  CHEST WALL AND LOWER NECK: Within normal limits.  VISUALIZED UPPER ABDOMEN: Within normal limits.  BONES: Sternotomy. Degenerative changes.    IMPRESSION:    No pulmonary embolism.    Mosaic attenuation pattern to the bilateral upper lobes which may be   related to edema or air trapping or small airways disease. Patchy   opacities in the left lower lobe and to a lesser extent in the right   lower lobe which may represent infection. Correlate clinically. Follow   these findings to resolution to exclude other processes.    < end of copied text >        Radiology: all available radiological tests reviewed    Advanced directives addressed: full resuscitation

## 2022-02-19 NOTE — PROGRESS NOTE ADULT - SUBJECTIVE AND OBJECTIVE BOX
87 y/o male with a PMHx of polycythemia vera, prostate CA, rheumatic fever, CAD s/p CABG,  Afib s/p PPM , HTN, ? Prostate CA  admitted with septic shock 2/2 HAP s/p pressors in CCU now in floors.     Pt seen and evaluated at bedside. Patient reports weakness, improvement in sob.  Denies cp, abd pain , ha, n/v.     Review of Systems  CONSTITUTIONAL: no fever or chill, +tired   EYES/ENT: No visual changes;  No vertigo or throat pain   NECK: No pain or stiffness  RESPIRATORY: No cough +sob, wheezing, hemoptysis  CARDIOVASCULAR: No chest pain or palpitations  GASTROINTESTINAL: No abdominal or epigastric pain. No nausea, vomiting, or hematemesis; No diarrhea or constipation. No melena or hematochezia.  GENITOURINARY: No dysuria, frequency or hematuria  NEUROLOGICAL: No numbness or weakness  SKIN: No itching, rashes    Physical Exam   GENERAL: NAD, Lying in the bed comfortably  HEAD:  Atraumatic, Normocephalic  EYES: EOMI, PERRLA, conjunctiva and sclera clear  ENT: Moist mucous membranes  NECK: Supple, No JVD  CHEST/LUNG: No crackles. +wheezing diffuse.  Unlabored respirations.   HEART: RRR.  No murmurs, rubs, or gallops  ABDOMEN: Bowel sounds present; Soft, Nontender, Nondistended. No hepatomegaly  EXTREMITIES:  2+ Peripheral Pulses  NERVOUS SYSTEM:  Alert & Oriented X3, speech clear. No deficits   MSK: FROM all 4 extremities, full and equal strength  SKIN: Ecchymosis noted on the upper extremities bilaterally      Vital Signs Last 24 Hrs  T(C): 37.8 (19 Feb 2022 10:20), Max: 38.7 (19 Feb 2022 05:25)  T(F): 100 (19 Feb 2022 10:20), Max: 101.6 (19 Feb 2022 05:25)  HR: 74 (19 Feb 2022 08:15) (69 - 75)  BP: 113/61 (19 Feb 2022 08:15) (104/42 - 123/53)  BP(mean): --  RR: 18 (19 Feb 2022 08:15) (18 - 22)  SpO2: 94% (19 Feb 2022 08:15) (94% - 96%)          MEDICATIONS  (STANDING):  atorvastatin 40 milliGRAM(s) Oral at bedtime  cefepime  Injectable. 1000 milliGRAM(s) IV Push every 8 hours  digoxin     Tablet 250 MICROGram(s) Oral daily  metoprolol tartrate 25 milliGRAM(s) Oral two times a day  pantoprazole    Tablet 40 milliGRAM(s) Oral two times a day  potassium chloride    Tablet ER 10 milliEquivalent(s) Oral daily        LABS: All Labs Reviewed:                                                                               10.1   6.57  )-----------( 249      ( 19 Feb 2022 06:41 )             31.3     02-19    134<L>  |  102  |  15  ----------------------------<  96  4.8   |  25  |  0.90    Ca    8.6      19 Feb 2022 06:41  Phos  2.6     02-19  Mg     2.3     02-19    TPro  6.3  /  Alb  2.3<L>  /  TBili  1.4<H>  /  DBili  x   /  AST  50<H>  /  ALT  61  /  AlkPhos  74  02-19            TPro  5.5<L>  /  Alb  1.8<L>  /  TBili  1.0  /  DBili  0.4<H>  /  AST  35  /  ALT  33  /  AlkPhos  57  02-15          I&O's Summary    14 Feb 2022 07:01  -  14 Feb 2022 18:24  --------------------------------------------------------  IN: 829 mL / OUT: 875 mL / NET: -46 mL          Radiology:    < from: CT Angio Chest PE Protocol w/ IV Cont (02.13.22 @ 23:16) >  IMPRESSION:    No pulmonary embolism.    Mosaic attenuation pattern to the bilateral upper lobes which may be   related to edema or air trapping or small airways disease. Patchy   opacities in the left lower lobe and to a lesser extent in the right   lower lobe which may represent infection. Correlate clinically. Follow   these findings to resolution to exclude other processes.    < end of copied text >

## 2022-02-19 NOTE — PROGRESS NOTE ADULT - ASSESSMENT
This is an 86 year old male with history of NSCLC ADENOCARINOMA on PEME MONOTHERAPY now admitted for PNA     NSCLC   - now on active treatment with Dr. Frausto   - patient on PEMETREXED   - patient likely cytopenic from recent chemotherapy administration - plt and WBC normal, Hb 8.8 today    Sepsis secondary to PNA   - now on CEFEPIME to cover GNR   - Continues to spike temps T max 101   - ID currently following   - CTA No pulmonary embolism. Patchy opacities in the left lower lobe and to a lesser extent in the right lower lobe which may represent infection.     ANEMIA   - possibly secondary to antineoplastic therapy vs GIB- pt has not received pRBC transfusion during therapy before  - seen by GI no plan for endosocpy, FOBT negative- c/w protonix  - Hb presently stable

## 2022-02-20 LAB
ANION GAP SERPL CALC-SCNC: 6 MMOL/L — SIGNIFICANT CHANGE UP (ref 5–17)
BUN SERPL-MCNC: 15 MG/DL — SIGNIFICANT CHANGE UP (ref 7–23)
CALCIUM SERPL-MCNC: 7.9 MG/DL — SIGNIFICANT CHANGE UP (ref 8.5–10.1)
CHLORIDE SERPL-SCNC: 103 MMOL/L — SIGNIFICANT CHANGE UP (ref 96–108)
CO2 SERPL-SCNC: 27 MMOL/L — SIGNIFICANT CHANGE UP (ref 22–31)
CREAT SERPL-MCNC: 0.72 MG/DL — SIGNIFICANT CHANGE UP (ref 0.5–1.3)
CULTURE RESULTS: NO GROWTH — SIGNIFICANT CHANGE UP
GLUCOSE SERPL-MCNC: 91 MG/DL — SIGNIFICANT CHANGE UP (ref 70–99)
HCT VFR BLD CALC: 25 % — LOW (ref 39–50)
HCT VFR BLD CALC: 25.6 % — LOW (ref 39–50)
HGB BLD-MCNC: 8.3 G/DL — LOW (ref 13–17)
HGB BLD-MCNC: 8.3 G/DL — LOW (ref 13–17)
MCHC RBC-ENTMCNC: 32.4 GM/DL — SIGNIFICANT CHANGE UP (ref 32–36)
MCHC RBC-ENTMCNC: 32.5 PG — SIGNIFICANT CHANGE UP (ref 27–34)
MCV RBC AUTO: 100.4 FL — HIGH (ref 80–100)
PLATELET # BLD AUTO: 256 K/UL — SIGNIFICANT CHANGE UP (ref 150–400)
POTASSIUM SERPL-MCNC: 3.2 MMOL/L — LOW (ref 3.5–5.3)
POTASSIUM SERPL-SCNC: 3.2 MMOL/L — LOW (ref 3.5–5.3)
RBC # BLD: 2.55 M/UL — LOW (ref 4.2–5.8)
RBC # FLD: 20 % — HIGH (ref 10.3–14.5)
SARS-COV-2 RNA SPEC QL NAA+PROBE: SIGNIFICANT CHANGE UP
SODIUM SERPL-SCNC: 136 MMOL/L — SIGNIFICANT CHANGE UP (ref 135–145)
SPECIMEN SOURCE: SIGNIFICANT CHANGE UP
WBC # BLD: 4.14 K/UL — SIGNIFICANT CHANGE UP (ref 3.8–10.5)
WBC # FLD AUTO: 4.14 K/UL — SIGNIFICANT CHANGE UP (ref 3.8–10.5)

## 2022-02-20 PROCEDURE — 99233 SBSQ HOSP IP/OBS HIGH 50: CPT

## 2022-02-20 RX ORDER — BUDESONIDE AND FORMOTEROL FUMARATE DIHYDRATE 160; 4.5 UG/1; UG/1
2 AEROSOL RESPIRATORY (INHALATION)
Refills: 0 | Status: DISCONTINUED | OUTPATIENT
Start: 2022-02-20 | End: 2022-02-25

## 2022-02-20 RX ORDER — TIOTROPIUM BROMIDE 18 UG/1
1 CAPSULE ORAL; RESPIRATORY (INHALATION) DAILY
Refills: 0 | Status: DISCONTINUED | OUTPATIENT
Start: 2022-02-20 | End: 2022-02-25

## 2022-02-20 RX ADMIN — PANTOPRAZOLE SODIUM 40 MILLIGRAM(S): 20 TABLET, DELAYED RELEASE ORAL at 11:18

## 2022-02-20 RX ADMIN — Medication 650 MILLIGRAM(S): at 16:24

## 2022-02-20 RX ADMIN — Medication 250 MICROGRAM(S): at 11:17

## 2022-02-20 RX ADMIN — BUDESONIDE AND FORMOTEROL FUMARATE DIHYDRATE 2 PUFF(S): 160; 4.5 AEROSOL RESPIRATORY (INHALATION) at 21:02

## 2022-02-20 RX ADMIN — ATORVASTATIN CALCIUM 40 MILLIGRAM(S): 80 TABLET, FILM COATED ORAL at 22:45

## 2022-02-20 RX ADMIN — Medication 10 MILLIEQUIVALENT(S): at 11:17

## 2022-02-20 RX ADMIN — Medication 650 MILLIGRAM(S): at 05:19

## 2022-02-20 RX ADMIN — Medication 25 MILLIGRAM(S): at 22:46

## 2022-02-20 RX ADMIN — APIXABAN 5 MILLIGRAM(S): 2.5 TABLET, FILM COATED ORAL at 11:18

## 2022-02-20 RX ADMIN — APIXABAN 5 MILLIGRAM(S): 2.5 TABLET, FILM COATED ORAL at 22:46

## 2022-02-20 RX ADMIN — Medication 650 MILLIGRAM(S): at 06:20

## 2022-02-20 RX ADMIN — PANTOPRAZOLE SODIUM 40 MILLIGRAM(S): 20 TABLET, DELAYED RELEASE ORAL at 22:46

## 2022-02-20 RX ADMIN — CEFEPIME 1000 MILLIGRAM(S): 1 INJECTION, POWDER, FOR SOLUTION INTRAMUSCULAR; INTRAVENOUS at 05:19

## 2022-02-20 RX ADMIN — Medication 25 MILLIGRAM(S): at 11:17

## 2022-02-20 NOTE — CONSULT NOTE ADULT - SUBJECTIVE AND OBJECTIVE BOX
Patient is a 86y old  Male who presents with a chief complaint of acute hypoxic respiratory failure  PNA (19 Feb 2022 13:52)      HPI:  85 y/o male with a PMHx of polycythemia vera, prostate CA, rheumatic fever, CAD s/p CABG,  Afib s/p PPM , HTN, ? Prostate CA  presents to the ED c/o generalized weakness, and  SOB. The pt has been feeling extreme weakness and SOB associated with fever for the past four days; hence, his wife advised him to visit [facility] for further evaluation.  Received Keytruda in the past for lung cancer  Per chart review from [facility], patient was seen by Dr Chow 12/2021  History of lung cancer, s/p right upper wedge resection 8/2016 with Dr Solorio.  Noted to have Adenocarcinoma that was 1.2cm  [place] [facility] [place].  Developed rash after chemotherapy and it was stopped.  History of PAF as well, sees Dr Jett, treated with amiodarone as well.  PFT revealed mild obstructive ventilatory defect. no air trapping hyperinflation, DLCO moderately reduced.  CT Chest at [facility] 1/2022 revealed that in the left lung there is a groundglass density with a centrally dilated bronchus in the LLL  measuring 1.8cm in greatest dimension and in the medial aspect of the left lower lobe.   In the medial aspect of the RUL there is a spiculated nodular density with the central areas of aeration that measures 2.6cm by 1.3cm (neoplastic finding cannot be excluded)  Thereafter underwent a new CT Chest this admission that revealed Patent central airways.  Right upper lobe postsurgical   changes. Mosaic attenuation pattern to the bilateral upper lobes which may be related to edema or air trapping or small airways disease. Patchy   opacities in the left lower lobe and to a lesser extent in the right lower lobe which may represent infection.   Noted to be febrile overnight    PAST MEDICAL & SURGICAL HISTORY:  Atrial fibrillation    Hypertension    CAD (coronary artery disease)  stent in 2007    Pacemaker    Rheumatic fever  child traylor    Prostate ca  radiation treatment 5yrs ago    Symptomatic bradycardia    Polycythemia vera    Artificial cardiac pacemaker  2006    History of PTCA  with stent to RCA 2007        MEDICATIONS  (STANDING):  apixaban 5 milliGRAM(s) Oral every 12 hours  atorvastatin 40 milliGRAM(s) Oral at bedtime  cefepime  Injectable. 1000 milliGRAM(s) IV Push every 8 hours  digoxin     Tablet 250 MICROGram(s) Oral daily  metoprolol tartrate 25 milliGRAM(s) Oral two times a day  pantoprazole    Tablet 40 milliGRAM(s) Oral two times a day  potassium chloride    Tablet ER 10 milliEquivalent(s) Oral daily    MEDICATIONS  (PRN):  acetaminophen     Tablet .. 650 milliGRAM(s) Oral every 6 hours PRN Temp greater or equal to 38.5C (101.3F), Moderate Pain (4 - 6)        FAMILY HISTORY:  Family history of bronchiectasis (Mother)        SOCIAL HISTORY:  ***    REVIEW OF SYSTEM:  dyspnea, fatigue    Vital Signs Last 24 Hrs  T(C): 38.3 (20 Feb 2022 05:15), Max: 38.6 (19 Feb 2022 14:40)  T(F): 101 (20 Feb 2022 05:15), Max: 101.4 (19 Feb 2022 14:40)  HR: 72 (19 Feb 2022 19:35) (69 - 72)  BP: 128/64 (19 Feb 2022 19:35) (97/55 - 128/64)  BP(mean): --  RR: 18 (19 Feb 2022 19:35) (17 - 18)  SpO2: 100% (19 Feb 2022 19:35) (98% - 100%)    I&O's Summary    19 Feb 2022 07:01  -  20 Feb 2022 07:00  --------------------------------------------------------  IN: 0 mL / OUT: 2400 mL / NET: -2400 mL      PHYSICAL EXAM  General Appearance: cooperative, no acute distress,   HEENT: PERRL, conjunctiva clear, EOM's intact, non injected pharynx, no exudate, TM   normal  Neck: Supple, , no adenopathy, thyroid: not enlarged, no carotid bruit or JVD  Back: Symmetric, no  tenderness,no soft tissue tenderness  Lungs: coarse bilaterally   Heart: Regular rate and rhythm, S1, S2 normal, no murmur, rub or gallop  Abdomen: Soft, non-tender, bowel sounds active , no hepatosplenomegaly  Extremities: no cyanosis or edema, no joint swelling  Skin: Skin color, texture normal, no rashes   Neurologic: Alert and oriented X3 , cranial nerves intact, sensory and motor normal,    ECG:    LABS:                          8.3    4.14  )-----------( 256      ( 20 Feb 2022 07:19 )             25.6     02-19    134<L>  |  102  |  15  ----------------------------<  96  4.8   |  25  |  0.90    Ca    8.6      19 Feb 2022 06:41  Phos  2.6     02-19  Mg     2.3     02-19    TPro  6.3  /  Alb  2.3<L>  /  TBili  1.4<H>  /  DBili  x   /  AST  50<H>  /  ALT  61  /  AlkPhos  74  02-19          Pro BNP  [id number] 02-15 @ 07:08  D Dimer  -- 02-15 @ 07:08  Pro BNP  [id number] 02-13 @ 21:55  D Dimer  -- 02-13 @ 21:55              RADIOLOGY & ADDITIONAL STUDIES:

## 2022-02-20 NOTE — PROGRESS NOTE ADULT - ASSESSMENT
This is an 86 year old male with history of NSCLC ADENOCARINOMA on PEME MONOTHERAPY now admitted for PNA     NSCLC   - now on active treatment with Dr. Frausto   - patient on PEMETREXED   - PATIENT  HAS  NOT RECEIVED IMMUNOTHERAPY SINCE beginning of JANUARY , Doubt I/O as source of recurrent fevers  - patient likely cytopenic from recent chemotherapy administration -    Sepsis secondary to PNA   - CEFEPIME has been discontinued  - possible fever secondary to underlying disease yet would consider atypical organisms perhaps allowing cultures to continue to grow   - Continues to spike temps T max 101   - ID currently following - discussed with ID - CEFEPIME d?lady  - CTA No pulmonary embolism. Patchy opacities in the left lower lobe and to a lesser extent in the right lower lobe which may represent infection.     ANEMIA   - possibly secondary to antineoplastic therapy vs GIB- pt has not received pRBC transfusion during therapy before  - seen by GI no plan for endosocpy, FOBT negative- c/w protonix  - Hb presently stable    This is an 86 year old male with history of NSCLC ADENOCARINOMA on PEME MONOTHERAPY now admitted for PNA     NSCLC   - now on active treatment with Dr. Frausto   - patient on PEMETREXED   - PATIENT  HAS  NOT RECEIVED IMMUNOTHERAPY SINCE beginning of JANUARY , Doubt I/O as source of recurrent fevers  - patient likely cytopenic from recent chemotherapy administration -    Sepsis secondary to PNA   - CEFEPIME has been discontinued  - possible fever secondary to underlying disease yet would consider atypical organisms perhaps allowing cultures to continue to grow   - Continues to spike temps T max 101   - ID currently following - discussed with ID - CEFEPIME d?lady  - CTA No pulmonary embolism. Patchy opacities in the left lower lobe and to a lesser extent in the right lower lobe which may represent infection.     ANEMIA   - possibly secondary to antineoplastic therapy vs GIB- pt has not received pRBC transfusion during therapy before  - seen by GI no plan for endosocpy, FOBT negative- c/w protonix  - Hb presently stable     DISCUSSED WITH SON

## 2022-02-20 NOTE — CONSULT NOTE ADULT - CONSULT REQUESTED DATE/TIME
15-Feb-2022 11:23
20-Feb-2022 08:54
17-Feb-2022 10:01
14-Feb-2022 04:24
17-Feb-2022 20:03
15-Feb-2022 20:06

## 2022-02-20 NOTE — PROGRESS NOTE ADULT - ATTENDING COMMENTS
Lung CA with METS  Still spiking temps  SP Cefepime  ? Neoplastic Fever  Work up negative  GI reconsult for GIB  now that he is stable  Heme/Pulm/ID following.

## 2022-02-20 NOTE — CONSULT NOTE ADULT - ASSESSMENT
2/15/22:t with above history and lung CA on chemoRx with SOB but likely not of cardiac origin.  Being treated for PNA in the face of underlying lung CA but clinically appears better.  His AF is rate controlled with an underlying PPM for bradycardia protection.  I would continue his current meds including the Digoxin as it does not appear to be near toxic range.  Continue as outlined by medicine and ID etal.  /will follow as work-up and treatment progresses.
This is an 86 year old male with history of NSCLC ADENOCARINOMA on PEME MONOTHERAPY now admitted for PNA     NSCLC   - now on active treatment with Dr. Frausto   - patient on PEMETREXED   - patient likely cytopenic from recent chemotherapy administration       Sepsis secondary to PNA   - now on CEFEPIME   - ID currently following     ANEMIA   = secondary to antineoplastic therapy   = no role for transfusion     Albert Pace MD   Hematology/ Oncology   [facility]   [facility]   C: [phone]  [address] [place], [place]  P:[phone]  F:[phone]  and [phone]  [address] [place], [place]   P:[phone]  F:[phone]
1) Dyspnea  2) Adenocarcinoma (unclear full staging)  3) Abnormal CT Chest  4) COPD    87 y/o male with a PMHx of polycythemia vera, prostate CA, rheumatic fever, CAD s/p CABG,  Afib s/p PPM , HTN, ? Prostate CA  presents to the ED c/o generalized weakness, and  SOB. The pt has been feeling extreme weakness and SOB associated with fever for the past four days; hence, his wife advised him to visit [facility] for further evaluation.  Received Keytruda in the past for lung cancer  Per chart review from [facility], patient was seen by Dr Chow 12/2021  History of lung cancer, s/p right upper wedge resection 8/2016 with Dr Solorio.  Noted to have Adenocarcinoma that was 1.2cm  [place] [facility] [place].  Developed rash after chemotherapy and it was stopped.  History of PAF as well, sees Dr Jett, treated with amiodarone as well.  PFT revealed mild obstructive ventilatory defect. no air trapping hyperinflation, DLCO moderately reduced.  CT Chest at [facility] 1/2022 revealed that in the left lung there is a groundglass density with a centrally dilated bronchus in the LLL  measuring 1.8cm in greatest dimension and in the medial aspect of the left lower lobe.   In the medial aspect of the RUL there is a spiculated nodular density with the central areas of aeration that measures 2.6cm by 1.3cm (neoplastic finding cannot be excluded)  Thereafter underwent a new CT Chest this admission that revealed Patent central airways.  Right upper lobe postsurgical   changes. Mosaic attenuation pattern to the bilateral upper lobes which may be related to edema or air trapping or small airways disease. Patchy   opacities in the left lower lobe and to a lesser extent in the right lower lobe which may represent infection.   Noted to be febrile overnight  In terms of etiology of the mosaic attenuation/air trapping/vascular disease on CT Chest, the thought process is that it is likely small airway disease   Appreciate ID recommendations  Will start Symbicort 2 puffs BID and Spiriva     Will discuss further with hospitalist service
85 y/o male with a PMHx of polycythemia vera, prostate CA, rheumatic fever, CAD s/p CABG,  Afib s/p PPM , HTN, admitted on 2/14  c/o generalized weakness, and  SOB. The pt has been feeling extreme weakness and SOB associated with fever for the past four days; associated with inability to stand, walk. The patient was recently on chemotherapy for lung cancer and about 6 months ago had a GI bleed. Patient is poor historian and history per medical record. Is requiring small doses of pressors since admission.     1. Patient admitted with pneumonia, patient at risk for gram negative rods and other resistant bacteria and is on pressor support  - follow up cultures   - serial cbc and monitor temperature   - iv hydration and supportive care   - oxygen and nebs as needed   - reviewed prior medical records to evaluate for resistant or atypical pathogens   - will continue cefepime as ordered which will cover gram negative rods including Pseudomonas  - steroids per medicine  - hold on further vancomycin for now  2. other issues: per medicine
86 year old male with active lung cancer on chemotherapy is admitted for PNA with septic shock     Respiraoty distress   -has not progresesd to respirtory fialure   -SOB has imorved with suplmental oxygen   -obtain ABG     septic shock  -30 cc/kg fluid challenge without improvement in blood pressure  -patient currently started on Levophed, will titrate for MAP 65-70  -repeat lactate  -blood/urine/sputum cultures, then initiate broad spectrum antibiotics  -follow cultures and narrow antibiotics as able  -goal UOP >0.5 cc/kg/hr  -procalcitonin     pneumonia   -Initial broad spectrum coverage for MDR organisms including staph aureus and gram negatives.   -Follow up sputum culture the narrow spectrum based on culture  sensitivities     -elevated troponin   -no cardiac symptoms including Chest pain   -likely demand ischemia secondary to respiraory distess  -will trend     -mild lower extremity edema  with ProBNP of 5332  -conervative fluids   -contiue lasix   -beta blocker for AFib ( amiodarone due to toxicity )  -B12 def Folate for anemia   -DVT prophylaxis with eliquis  -Code status is Full code, MOLST signed and in chart       Critical Care time: 35 mins assessing presenting problems of acute illness that poses high probability of life threatening deterioration or end organ damage/dysfunction.  Medical decision making inculding Initiating plan of care, reviewing data, reviewing radiology,direct patient bedside evaluation and interpretation of vital signs, any necessary ventilator management , discusion with multidisciplinary team, discussing goals of care with patient/family, all non inclusive of procedures, COVID 19 specific considerations and therapeutic  options based on the available and rapidly changing literature       
  86 year old man with polycythemia vera, CAD s/p CABG, afib, pacemaker, lung cancer on chemo, admitted with septic shock from pneumonia consulted for anemia.     Patient with anemia to hgb 6's with excellent response to blood, this in the setting of one dark BM and in the setting of septic shock from pneumonia.   Most likely this was stress gastritis. He currently is HD stable without overt red bleeding, therefore an endoscopy is most likely diagnostic and not therapeutic. Despite blood loss, I don't think with his age and comorbidities, with active pneumonia, that an EGD is indicated; I am not debating whether or not he had bleeding, his hgb dropped two points and had dark stools, I just don't think an endoscopy that is highly unlikely to be therapeutic is indicated at this point.     Please continue diet.   Can give him BID PPI for now, will continue for 8 weeks and we can decide on follow up what to do.   If there are any changes in clinical status please do not hesitate to call.

## 2022-02-20 NOTE — PROGRESS NOTE ADULT - SUBJECTIVE AND OBJECTIVE BOX
INTERVAL HPI/OVERNIGHT EVENTS:  Patient S&E at bedside.  Continues to spike high grade temps   O/N 101.4     Vital Signs Last 24 Hrs  T(C): 36.7 (20 Feb 2022 08:45), Max: 38.6 (19 Feb 2022 14:40)  T(F): 98 (20 Feb 2022 08:45), Max: 101.4 (19 Feb 2022 14:40)  HR: 72 (19 Feb 2022 19:35) (69 - 72)  BP: 97/51 (20 Feb 2022 08:45) (97/51 - 128/64)  BP(mean): --  RR: 18 (20 Feb 2022 08:45) (17 - 18)  SpO2: 100% (19 Feb 2022 19:35) (98% - 100%)    PHYSICAL EXAM:    Constitutional: NAD  Eyes: EOMI, sclera non-icteric  Respiratory: no rhonchi or wheezing   Cardiovascular: RRR, no M/R/G  Gastrointestinal: soft, NTND, no masses palpable, + BS, no hepatosplenomegaly  Extremities: no c/c/e  Neurological: AAOx3      MEDICATIONS  (STANDING):  apixaban 5 milliGRAM(s) Oral every 12 hours  atorvastatin 40 milliGRAM(s) Oral at bedtime  budesonide 160 MICROgram(s)/formoterol 4.5 MICROgram(s) Inhaler 2 Puff(s) Inhalation two times a day  digoxin     Tablet 250 MICROGram(s) Oral daily  metoprolol tartrate 25 milliGRAM(s) Oral two times a day  pantoprazole    Tablet 40 milliGRAM(s) Oral two times a day  potassium chloride    Tablet ER 10 milliEquivalent(s) Oral daily  tiotropium 18 MICROgram(s) Capsule 1 Capsule(s) Inhalation daily    MEDICATIONS  (PRN):  acetaminophen     Tablet .. 650 milliGRAM(s) Oral every 6 hours PRN Temp greater or equal to 38.5C (101.3F), Moderate Pain (4 - 6)      Allergies    No Known Allergies    Intolerances        LABS:                        8.3    4.14  )-----------( 256      ( 20 Feb 2022 07:19 )             25.6     02-20    136  |  103  |  15  ----------------------------<  91  3.2<L>   |  27  |  0.72    Ca    7.9      20 Feb 2022 07:19  Phos  2.6     02-19  Mg     2.3     02-19    TPro  6.3  /  Alb  2.3<L>  /  TBili  1.4<H>  /  DBili  x   /  AST  50<H>  /  ALT  61  /  AlkPhos  74  02-19          RADIOLOGY & ADDITIONAL TESTS:  Studies reviewed.    ASSESSMENT & PLAN:

## 2022-02-20 NOTE — PROGRESS NOTE ADULT - ASSESSMENT
87 y/o male with a PMHx of polycythemia vera, prostate CA, rheumatic fever, CAD s/p CABG,  Afib s/p PPM , HTN, ? Prostate CA  presents to the ED c/o generalized weakness, and  SOB.     # Severe sepsis and acute respiratory failure  -pt with fever overnight possibly neoplastic  -Pulse ox 98 on 2L  -lactate 3.1->2L IVF->1.4  -F/U Bcx and UCx NGTD  -Completed cefepime day 7#. Observe off antibiotics.  -pulmonology consult-Dr Montemayor     # Chronic CHF  -stable, no sxs of overt CHF  -BNP 5332  -TTE: EF 55-60%, mod LA dilation     #Elevated troponin  - Type 2 MI  -Probably 2/2 to demand ischemia     #Acute Blood Loss Macrocytic anemia   -B12 mildly elevated, folate wnl   -s/p 1 U PRBC 2/17 after report of bloody stool, Hgb drop to 6.8  -GI consult appreciated: PPI BID x 8 weeks, possible EGD later as outpatient   -Hgb stable today. No further GIB episodes since eliquis was restarted      # Hyponatremia  -mild, received IVF, will monitor     #Cad, s/p CABG   -cont bb, Lasix   - recent cath negative    #Chronic Afib   -AV pacing on monitor   -cont Digoxin, level 1.42 on 2/19  -cont  lopressor 25 mg BID   -Pt is not taking amiodarone due to toxicity  -[id number]: 5  -Hgb continues stable, will restart eliquis and continue to monitor     #DVT prophylaxis   -eliquis     #GOC  -DNR/DNI  -MOLST form signed and in chart          85 y/o male with a PMHx of polycythemia vera, prostate CA, rheumatic fever, CAD s/p CABG,  Afib s/p PPM , HTN, ? Prostate CA  presents to the ED c/o generalized weakness, and  SOB.     # Severe sepsis and acute respiratory failure  -pt with fever overnight possibly neoplastic  -Pulse ox 98 on 2L  -lactate 3.1->2L IVF->1.4  -F/U Bcx and UCx NGTD  -Completed cefepime day 7#. Observe off antibiotics.  -pulmonology consult-Dr Montemayor     # Chronic CHF  -stable, no sxs of overt CHF  -BNP 5332  -TTE: EF 55-60%, mod LA dilation     #Elevated troponin  - Type 2 MI  -Probably 2/2 to demand ischemia     #Acute Blood Loss Macrocytic anemia   -B12 mildly elevated, folate wnl   -s/p 1 U PRBC 2/17 after report of bloody stool, Hgb drop to 6.8  -GI consult appreciated: PPI BID x 8 weeks, possible EGD later as outpatient   -Hgb 8.3 today from 10. No further GIB episodes since eliquis was restarted.  -Stop Eliquis if GI bleeding occurs again. Repeat H/H later and treat if less than 7        # Hyponatremia  -mild, received IVF, will monitor     #Cad, s/p CABG   -cont bb, Lasix   - recent cath negative    #Chronic Afib   -AV pacing on monitor   -cont Digoxin, level 1.42 on 2/19  -cont  lopressor 25 mg BID   -Pt is not taking amiodarone due to toxicity  -[id number]: 5      #DVT prophylaxis   -eliquis     #GOC  -DNR/DNI  -MOLST form signed and in chart

## 2022-02-20 NOTE — PROGRESS NOTE ADULT - ASSESSMENT
85 y/o male with a PMHx of polycythemia vera, prostate CA, rheumatic fever, CAD s/p CABG,  Afib s/p PPM , HTN, admitted on 2/14  c/o generalized weakness, and  SOB. The pt has been feeling extreme weakness and SOB associated with fever for the past four days; associated with inability to stand, walk. The patient was recently on chemotherapy for lung cancer and about 6 months ago had a GI bleed. Patient is poor historian and history per medical record. Is requiring small doses of pressors since admission.     1. Patient admitted with pneumonia, patient at risk for gram negative rods and other resistant bacteria and is on pressor support  - follow up cultures   - serial cbc and monitor temperature   - iv hydration and supportive care   - oxygen and nebs as needed   - reviewed prior medical records to evaluate for resistant or atypical pathogens   - completed 7 days cefepime, in lieu of this the patient has intermittent fever; the patient does not clinically appear to have pneumonia, therefore will stop cefepime  - fever may be secondary to immunotherapy versus gastritis and/or GI bleed  2. other issues: per medicine

## 2022-02-20 NOTE — PROGRESS NOTE ADULT - SUBJECTIVE AND OBJECTIVE BOX
85 y/o male with a PMHx of polycythemia vera, prostate CA, rheumatic fever, CAD s/p CABG,  Afib s/p PPM , HTN, ? Prostate CA  admitted with septic shock 2/2 HAP s/p pressors in CCU now in floors.     Progress: Pt seen and evaluated at bedside. Patient reports weakness, improvement in sob.  Denies cp, abd pain , ha, n/v.     Review of Systems  CONSTITUTIONAL: no fever or chill, +tired   EYES/ENT: No visual changes;  No vertigo or throat pain   NECK: No pain or stiffness  RESPIRATORY: No cough +sob, wheezing, hemoptysis  CARDIOVASCULAR: No chest pain or palpitations  GASTROINTESTINAL: No abdominal or epigastric pain. No nausea, vomiting, or hematemesis; No diarrhea or constipation. No melena or hematochezia.  GENITOURINARY: No dysuria, frequency or hematuria  NEUROLOGICAL: No numbness or weakness  SKIN: No itching, rashes    Physical Exam   GENERAL: NAD, Lying in the bed comfortably  HEAD:  Atraumatic, Normocephalic  EYES: EOMI, PERRLA, conjunctiva and sclera clear  ENT: Moist mucous membranes  NECK: Supple, No JVD  CHEST/LUNG: No crackles. +wheezing diffuse.  Unlabored respirations.   HEART: RRR.  No murmurs, rubs, or gallops  ABDOMEN: Bowel sounds present; Soft, Nontender, Nondistended. No hepatomegaly  EXTREMITIES:  2+ Peripheral Pulses  NERVOUS SYSTEM:  Alert & Oriented X3, speech clear. No deficits   MSK: FROM all 4 extremities, full and equal strength  SKIN: Ecchymosis noted on the upper extremities bilaterally      Vital Signs Last 24 Hrs  T(C): 37.8 (19 Feb 2022 10:20), Max: 38.7 (19 Feb 2022 05:25)  T(F): 100 (19 Feb 2022 10:20), Max: 101.6 (19 Feb 2022 05:25)  HR: 74 (19 Feb 2022 08:15) (69 - 75)  BP: 113/61 (19 Feb 2022 08:15) (104/42 - 123/53)  BP(mean): --  RR: 18 (19 Feb 2022 08:15) (18 - 22)  SpO2: 94% (19 Feb 2022 08:15) (94% - 96%)          MEDICATIONS  (STANDING):  atorvastatin 40 milliGRAM(s) Oral at bedtime  cefepime  Injectable. 1000 milliGRAM(s) IV Push every 8 hours  digoxin     Tablet 250 MICROGram(s) Oral daily  metoprolol tartrate 25 milliGRAM(s) Oral two times a day  pantoprazole    Tablet 40 milliGRAM(s) Oral two times a day  potassium chloride    Tablet ER 10 milliEquivalent(s) Oral daily        LABS: All Labs Reviewed:                                            `                  8.3    4.14  )-----------( 256      ( 20 Feb 2022 07:19 )             25.6     02-20    136  |  103  |  15  ----------------------------<  91  3.2<L>   |  27  |  0.72    Ca    7.9      20 Feb 2022 07:19  Phos  2.6     02-19  Mg     2.3     02-19    TPro  6.3  /  Alb  2.3<L>  /  TBili  1.4<H>  /  DBili  x   /  AST  50<H>  /  ALT  61  /  AlkPhos  74  02-19      Radiology:    < from: CT Angio Chest PE Protocol w/ IV Cont (02.13.22 @ 23:16) >  IMPRESSION:    No pulmonary embolism.    Mosaic attenuation pattern to the bilateral upper lobes which may be   related to edema or air trapping or small airways disease. Patchy   opacities in the left lower lobe and to a lesser extent in the right   lower lobe which may represent infection. Correlate clinically. Follow   these findings to resolution to exclude other processes.    < end of copied text >

## 2022-02-20 NOTE — CONSULT NOTE ADULT - REASON FOR ADMISSION
acute hypoxic respiratory failure  PNA

## 2022-02-20 NOTE — PROGRESS NOTE ADULT - SUBJECTIVE AND OBJECTIVE BOX
Date of service: 02-20-22 @ 10:29      Patient lying in bed; nephew at bedside who is a cardiothoracic surgeon, has many questions about patient care; including his chemotherapy, immunotherapy  Patient denies any complaints, noted with fever yesterday to 101, no cough is comfortable, no other specific complaints      ROS unable to obtain secondary to patient medical condition     MEDICATIONS  (STANDING):  apixaban 5 milliGRAM(s) Oral every 12 hours  atorvastatin 40 milliGRAM(s) Oral at bedtime  budesonide 160 MICROgram(s)/formoterol 4.5 MICROgram(s) Inhaler 2 Puff(s) Inhalation two times a day  digoxin     Tablet 250 MICROGram(s) Oral daily  metoprolol tartrate 25 milliGRAM(s) Oral two times a day  pantoprazole    Tablet 40 milliGRAM(s) Oral two times a day  potassium chloride    Tablet ER 10 milliEquivalent(s) Oral daily  tiotropium 18 MICROgram(s) Capsule 1 Capsule(s) Inhalation daily    MEDICATIONS  (PRN):  acetaminophen     Tablet .. 650 milliGRAM(s) Oral every 6 hours PRN Temp greater or equal to 38.5C (101.3F), Moderate Pain (4 - 6)      Vital Signs Last 24 Hrs  T(C): 36.7 (20 Feb 2022 08:45), Max: 38.6 (19 Feb 2022 14:40)  T(F): 98 (20 Feb 2022 08:45), Max: 101.4 (19 Feb 2022 14:40)  HR: 72 (19 Feb 2022 19:35) (69 - 72)  BP: 97/51 (20 Feb 2022 08:45) (97/51 - 128/64)  BP(mean): --  RR: 18 (20 Feb 2022 08:45) (17 - 18)  SpO2: 100% (19 Feb 2022 19:35) (98% - 100%)        Physical Exam:          Constitutional: frail looking  HEENT: NC/AT, EOMI, PERRLA, conjunctivae clear; ears and nose atraumatic; pharynx clear  Neck: supple; thyroid not palpable  Back: no tenderness  Respiratory: respiratory effort normal; clear to auscultation bilaterally  Cardiovascular: S1S2 regular, no murmurs  Abdomen: soft, not tender, not distended, positive BS; no liver or spleen organomegaly  Genitourinary: no suprapubic tenderness  Musculoskeletal: no muscle tenderness, no joint swelling or tenderness  Neurological/ Psychiatric:  moving all extremities  Skin: no rashes; no palpable lesions; multiple ecchymosis    Labs: all available labs reviewed                         Labs:           Labs:                        8.3    4.14  )-----------( 256      ( 20 Feb 2022 07:19 )             25.6     02-20    136  |  103  |  15  ----------------------------<  91  3.2<L>   |  27  |  0.72    Ca    7.9      20 Feb 2022 07:19  Phos  2.6     02-19  Mg     2.3     02-19    TPro  6.3  /  Alb  2.3<L>  /  TBili  1.4<H>  /  DBili  x   /  AST  50<H>  /  ALT  61  /  AlkPhos  74  02-19           Cultures:       Culture - Blood (collected 02-19-22 @ 06:41)  Source: .Blood None  Preliminary Report (02-20-22 @ 09:01):    No growth to date.    Culture - Blood (collected 02-19-22 @ 06:41)  Source: .Blood None  Preliminary Report (02-20-22 @ 09:01):    No growth to date.    Culture - Blood (collected 02-14-22 @ 06:52)  Source: .Blood None  Final Report (02-19-22 @ 13:00):    No Growth Final    Culture - Blood (collected 02-13-22 @ 23:37)  Source: .Blood None  Final Report (02-19-22 @ 09:01):    No Growth Final    Culture - Urine (collected 02-13-22 @ 23:37)  Source: Clean Catch None  Final Report (02-15-22 @ 07:51):    No growth        < from: Xray Chest 1 View-PORTABLE IMMEDIATE (Xray Chest 1 View-PORTABLE IMMEDIATE .) (02.19.22 @ 06:33) >  ACC: [id number] EXAM:  XR CHEST PORTABLE IMMED 1V                          PROCEDURE DATE:  02/19/2022          INTERPRETATION:  Clinical Information: Dyspnea    Technique: AP chest x-ray(s).    Comparison: None    Findings/  Impression: Status post CABG. status post pacemaker. No lung   consolidations. Trace right pleural effusion.    < end of copied text >                < from: CT Angio Chest PE Protocol w/ IV Cont (02.13.22 @ 23:16) >    ACC: [id number] EXAM:  CT ANGIO CHEST PULM GAVIN DEAN                          PROCEDURE DATE:  02/13/2022          INTERPRETATION:  CLINICAL INFORMATION: Lung cancer, hypoxia, evaluate for   pulmonary embolism    COMPARISON: Chest x-ray 2/13/2022. CTabdomen pelvis 9/12/2014    CONTRAST/COMPLICATIONS:  IV Contrast: Omnipaque 350  90 cc administered   10 cc discarded  Oral Contrast: NONE  Complications: None reported at time of study completion    PROCEDURE:  CT Angiography of the Chest.  [place] coronal reformats were performed as well as 3D (MIP)   reconstructions.    FINDINGS:    LUNGS AND AIRWAYS: Patent central airways.  Right upper lobe postsurgical   changes. Mosaic attenuation pattern to the bilateral upper lobes which   may be related to edema or air trapping or small airways disease. Patchy   opacities in the left lower lobe and to a lesser extent in the right   lower lobe which may represent infection. Correlate clinically. Follow   these findings to resolution to exclude other processes.  PLEURA: Trace bilateral pleural fluid.  MEDIASTINUM AND KATELYN: No lymphadenopathy.  VESSELS: No pulmonary embolism.  HEART: Cardiomegaly. Coronary artery calcifications. No pericardial   effusion.  CHEST WALL AND LOWER NECK: Within normal limits.  VISUALIZED UPPER ABDOMEN: Within normal limits.  BONES: Sternotomy. Degenerative changes.    IMPRESSION:    No pulmonary embolism.    Mosaic attenuation pattern to the bilateral upper lobes which may be   related to edema or air trapping or small airways disease. Patchy   opacities in the left lower lobe and to a lesser extent in the right   lower lobe which may represent infection. Correlate clinically. Follow   these findings to resolution to exclude other processes.    < end of copied text >        Radiology: all available radiological tests reviewed    Advanced directives addressed: full resuscitation

## 2022-02-21 LAB
ANION GAP SERPL CALC-SCNC: 5 MMOL/L — SIGNIFICANT CHANGE UP (ref 5–17)
BUN SERPL-MCNC: 14 MG/DL — SIGNIFICANT CHANGE UP (ref 7–23)
CALCIUM SERPL-MCNC: 8 MG/DL — LOW (ref 8.5–10.1)
CHLORIDE SERPL-SCNC: 102 MMOL/L — SIGNIFICANT CHANGE UP (ref 96–108)
CO2 SERPL-SCNC: 29 MMOL/L — SIGNIFICANT CHANGE UP (ref 22–31)
CREAT SERPL-MCNC: 0.84 MG/DL — SIGNIFICANT CHANGE UP (ref 0.5–1.3)
FERRITIN SERPL-MCNC: 1202 NG/ML — HIGH (ref 30–400)
GLUCOSE SERPL-MCNC: 132 MG/DL — HIGH (ref 70–99)
HCT VFR BLD CALC: 26.3 % — LOW (ref 39–50)
HGB BLD-MCNC: 8.8 G/DL — LOW (ref 13–17)
IRON SATN MFR SERPL: 19 % — SIGNIFICANT CHANGE UP (ref 16–55)
IRON SATN MFR SERPL: 26 UG/DL — LOW (ref 45–165)
LACTATE SERPL-SCNC: 1.8 MMOL/L — SIGNIFICANT CHANGE UP (ref 0.7–2)
MCHC RBC-ENTMCNC: 33.5 GM/DL — SIGNIFICANT CHANGE UP (ref 32–36)
MCHC RBC-ENTMCNC: 33.5 PG — SIGNIFICANT CHANGE UP (ref 27–34)
MCV RBC AUTO: 100 FL — SIGNIFICANT CHANGE UP (ref 80–100)
PLATELET # BLD AUTO: 300 K/UL — SIGNIFICANT CHANGE UP (ref 150–400)
POTASSIUM SERPL-MCNC: 3.7 MMOL/L — SIGNIFICANT CHANGE UP (ref 3.5–5.3)
POTASSIUM SERPL-SCNC: 3.7 MMOL/L — SIGNIFICANT CHANGE UP (ref 3.5–5.3)
RBC # BLD: 2.63 M/UL — LOW (ref 4.2–5.8)
RBC # FLD: 20 % — HIGH (ref 10.3–14.5)
SODIUM SERPL-SCNC: 136 MMOL/L — SIGNIFICANT CHANGE UP (ref 135–145)
TIBC SERPL-MCNC: 134 UG/DL — LOW (ref 220–430)
UIBC SERPL-MCNC: 108 UG/DL — LOW (ref 110–370)
WBC # BLD: 4.36 K/UL — SIGNIFICANT CHANGE UP (ref 3.8–10.5)
WBC # FLD AUTO: 4.36 K/UL — SIGNIFICANT CHANGE UP (ref 3.8–10.5)

## 2022-02-21 PROCEDURE — 99233 SBSQ HOSP IP/OBS HIGH 50: CPT

## 2022-02-21 RX ADMIN — Medication 10 MILLIEQUIVALENT(S): at 11:07

## 2022-02-21 RX ADMIN — Medication 250 MICROGRAM(S): at 11:08

## 2022-02-21 RX ADMIN — Medication 650 MILLIGRAM(S): at 15:47

## 2022-02-21 RX ADMIN — TIOTROPIUM BROMIDE 1 CAPSULE(S): 18 CAPSULE ORAL; RESPIRATORY (INHALATION) at 09:52

## 2022-02-21 RX ADMIN — ATORVASTATIN CALCIUM 40 MILLIGRAM(S): 80 TABLET, FILM COATED ORAL at 21:16

## 2022-02-21 RX ADMIN — BUDESONIDE AND FORMOTEROL FUMARATE DIHYDRATE 2 PUFF(S): 160; 4.5 AEROSOL RESPIRATORY (INHALATION) at 09:51

## 2022-02-21 RX ADMIN — APIXABAN 5 MILLIGRAM(S): 2.5 TABLET, FILM COATED ORAL at 21:16

## 2022-02-21 RX ADMIN — PANTOPRAZOLE SODIUM 40 MILLIGRAM(S): 20 TABLET, DELAYED RELEASE ORAL at 11:07

## 2022-02-21 RX ADMIN — PANTOPRAZOLE SODIUM 40 MILLIGRAM(S): 20 TABLET, DELAYED RELEASE ORAL at 21:16

## 2022-02-21 RX ADMIN — APIXABAN 5 MILLIGRAM(S): 2.5 TABLET, FILM COATED ORAL at 11:08

## 2022-02-21 RX ADMIN — Medication 25 MILLIGRAM(S): at 21:16

## 2022-02-21 RX ADMIN — BUDESONIDE AND FORMOTEROL FUMARATE DIHYDRATE 2 PUFF(S): 160; 4.5 AEROSOL RESPIRATORY (INHALATION) at 20:11

## 2022-02-21 NOTE — PROGRESS NOTE ADULT - SUBJECTIVE AND OBJECTIVE BOX
INTERVAL HPI/OVERNIGHT EVENTS:  Patient S&E at bedside. No o/n events,     PAST MEDICAL & SURGICAL HISTORY:  Atrial fibrillation    Hypertension    CAD (coronary artery disease)  stent in 2007    Pacemaker    Rheumatic fever  child traylor    Prostate ca  radiation treatment 5yrs ago    Symptomatic bradycardia    Polycythemia vera    Artificial cardiac pacemaker  2006    History of PTCA  with stent to RCA 2007        FAMILY HISTORY:  Family history of bronchiectasis (Mother)        VITAL SIGNS:  T(F): 99.5 (02-21-22 @ 08:00)  HR: 70 (02-21-22 @ 08:00)  BP: 101/47 (02-21-22 @ 08:00)  RR: 17 (02-21-22 @ 08:00)  SpO2: 99% (02-21-22 @ 08:00)  Wt(kg): --    PHYSICAL EXAM:    Constitutional: NAD, on nc eating breakfast  Respiratory: cta b/l  Cardiovascular: irregular rhythm  Gastrointestinal: soft, NTND,  Extremities: no c/c/e  Neurological: AAOx3      MEDICATIONS  (STANDING):  apixaban 5 milliGRAM(s) Oral every 12 hours  atorvastatin 40 milliGRAM(s) Oral at bedtime  budesonide 160 MICROgram(s)/formoterol 4.5 MICROgram(s) Inhaler 2 Puff(s) Inhalation two times a day  digoxin     Tablet 250 MICROGram(s) Oral daily  metoprolol tartrate 25 milliGRAM(s) Oral two times a day  pantoprazole    Tablet 40 milliGRAM(s) Oral two times a day  potassium chloride    Tablet ER 10 milliEquivalent(s) Oral daily  tiotropium 18 MICROgram(s) Capsule 1 Capsule(s) Inhalation daily    MEDICATIONS  (PRN):  acetaminophen     Tablet .. 650 milliGRAM(s) Oral every 6 hours PRN Temp greater or equal to 38.5C (101.3F), Moderate Pain (4 - 6)      Allergies    No Known Allergies    Intolerances        LABS:                        8.3    x     )-----------( x        ( 20 Feb 2022 18:01 )             25.0     02-20    136  |  103  |  15  ----------------------------<  91  3.2<L>   |  27  |  0.72    Ca    7.9      20 Feb 2022 07:19            RADIOLOGY & ADDITIONAL TESTS:  Studies reviewed.   INTERVAL HPI/OVERNIGHT EVENTS:  Patient S&E at bedside. No o/n events, pt up and eating this morning, reports feeling better. REmains on NC. Had fever yesterfday of 102.2 at 4 pm yesterday and 100.7 at 530, afebrile since.     PAST MEDICAL & SURGICAL HISTORY:  Atrial fibrillation    Hypertension    CAD (coronary artery disease)  stent in 2007    Pacemaker    Rheumatic fever  child traylor    Prostate ca  radiation treatment 5yrs ago    Symptomatic bradycardia    Polycythemia vera    Artificial cardiac pacemaker  2006    History of PTCA  with stent to RCA 2007        FAMILY HISTORY:  Family history of bronchiectasis (Mother)        VITAL SIGNS:  T(F): 99.5 (02-21-22 @ 08:00)  HR: 70 (02-21-22 @ 08:00)  BP: 101/47 (02-21-22 @ 08:00)  RR: 17 (02-21-22 @ 08:00)  SpO2: 99% (02-21-22 @ 08:00)  Wt(kg): --    PHYSICAL EXAM:    Constitutional: NAD, on nc eating breakfast  Respiratory: cta b/l  Cardiovascular: irregular rhythm  Gastrointestinal: soft, NTND,  Extremities: no c/c/e  Neurological: AAOx3      MEDICATIONS  (STANDING):  apixaban 5 milliGRAM(s) Oral every 12 hours  atorvastatin 40 milliGRAM(s) Oral at bedtime  budesonide 160 MICROgram(s)/formoterol 4.5 MICROgram(s) Inhaler 2 Puff(s) Inhalation two times a day  digoxin     Tablet 250 MICROGram(s) Oral daily  metoprolol tartrate 25 milliGRAM(s) Oral two times a day  pantoprazole    Tablet 40 milliGRAM(s) Oral two times a day  potassium chloride    Tablet ER 10 milliEquivalent(s) Oral daily  tiotropium 18 MICROgram(s) Capsule 1 Capsule(s) Inhalation daily    MEDICATIONS  (PRN):  acetaminophen     Tablet .. 650 milliGRAM(s) Oral every 6 hours PRN Temp greater or equal to 38.5C (101.3F), Moderate Pain (4 - 6)      Allergies    No Known Allergies    Intolerances        LABS:                        8.3    x     )-----------( x        ( 20 Feb 2022 18:01 )             25.0     02-20    136  |  103  |  15  ----------------------------<  91  3.2<L>   |  27  |  0.72    Ca    7.9      20 Feb 2022 07:19            RADIOLOGY & ADDITIONAL TESTS:  Studies reviewed.

## 2022-02-21 NOTE — PROGRESS NOTE ADULT - ASSESSMENT
2/15/22: Pt with above history and lung CA on chemoRx with SOB but likely not of cardiac origin.  Being treated for PNA in the face of underlying lung CA but clinically appears better.  His AF is rate controlled with an underlying PPM for bradycardia protection.  I would continue his current meds including the Digoxin as it does not appear to be near toxic range.  Continue as outlined by medicine and ID etal.  Will follow as work-up and treatment progresses.    2/16/22:  Less SOB.  No anginal chest pains or other cardiac changes.  A-V pacing on the monitor.  Continue as outlined by medicine and ID etal.  Will follow as work-up and treatment progresses.    2/17/22:  Slowly improving.  Remains afebrile.  H&H low but no obvious bleeding.  A-v pacing on the monitor.  No new cardiac symptoms.  Continue as outlined by medicine and ID etal.  Will continue to follow as treatment progresses.    2/18/22:  Feeling good.  A-V pacing on the monitor. No new cardiac symptoms.  Was scheduled for chemoRx at [place] next week but this was cancelled due to his PNA.  Continue as outlined by medicine and ID etal.  I will be away this weekend.  Dr Quintero will be covering me if needed.    2/21/22;  Feeling better with less SOB.  A-V pacing on the monitor with occasional AF with controlled VR.  No new cardiac symptoms.  H/H 8.3 twice yesterday.  Continue as outlined by medicine and ID etal.  Will continue to follow intermittently prn.

## 2022-02-21 NOTE — PROGRESS NOTE ADULT - ASSESSMENT
This is an 86 year old male with history of NSCLC ADENOCARINOMA on Pemetrexed MONOTHERAPY now admitted for PNA     NSCLC   - now on active treatment with Dr. Frausto   - patient on PEMETREXED   - PATIENT HAS NOT RECEIVED IMMUNOTHERAPY SINCE beginning of JANUARY, Doubt I/O as source of recurrent fevers  - patient likely cytopenic from recent chemotherapy administration - w/ pemetrexed    Sepsis secondary to PNA   - CEFEPIME has been discontinued- afebrile last 24 hrs, tmax 99.5*F  - possible fever secondary to underlying disease yet would consider atypical organisms perhaps allowing cultures to continue to grow   - ID currently following - discussed with ID - CEFEPIME d/lady  - CTA No pulmonary embolism. Patchy opacities in the left lower lobe and to a lesser extent in the right lower lobe which may represent infection.   - re-culture if spikes another fever    ANEMIA   - possibly secondary to antineoplastic therapy vs GIB- pt has not received pRBC transfusion during therapy before  - seen by GI no plan for endoscopy FOBT negative- c/w protonix  - Hb dropped yesterday from 10.1->8.3, was 8.3 on repeat at night- continue to trend as pt back on eliquis    DISCUSSED WITH SON    Dr. Chad Cristina  cell: [phone]  Weekends and nights please call [phone] for MD on call  [facility]  Hematology/Oncology  This is an 86 year old male with history of NSCLC ADENOCARINOMA on Pemetrexed MONOTHERAPY now admitted for PNA     NSCLC   - now on active treatment with Dr. Frausto   - patient on PEMETREXED   - PATIENT HAS NOT RECEIVED IMMUNOTHERAPY SINCE beginning of JANUARY, Doubt I/O as source of recurrent fevers  - patient likely cytopenic from recent chemotherapy administration - w/ pemetrexed    Sepsis secondary to PNA   - CEFEPIME has been discontinued- afebrile last 24 hrs, tmax 99.5*F  - possible fever secondary to underlying disease yet would consider atypical organisms perhaps allowing cultures to continue to grow   - ID currently following - discussed with ID - CEFEPIME d/lady  - CTA No pulmonary embolism. Patchy opacities in the left lower lobe and to a lesser extent in the right lower lobe which may represent infection.   - re-culture if spikes another fever    ANEMIA   - possibly secondary to antineoplastic therapy vs GIB- pt has not received pRBC transfusion during therapy before  - seen by GI no plan for endoscopy FOBT negative- c/w protonix  - Hb dropped yesterday from 10.1->8.3, was 8.3 --> - Hb 8.8 today- continue to trend as pt back on eliquis    Dr. Chad Cristina  cell: [phone]  Weekends and nights please call [phone] for MD on call  [facility]  Hematology/Oncology

## 2022-02-21 NOTE — PROGRESS NOTE ADULT - SUBJECTIVE AND OBJECTIVE BOX
HPI: 87 y/o male with a PMHx of polycythemia vera, prostate CA, rheumatic fever, CAD s/p CABG,  Afib s/p PPM , HTN, ? Prostate CA  admitted with septic shock 2/2 HAP s/p pressors in CCU now in floors.    Subjective: No events overnight-- pt was monitored off abx and had no episodes of fevers or chills. Today pt is "feeling better," endorsing improvement in breathing although he c/o some weakness. No ha, dizziness, SOB, chest pain, n/v. No other symptoms or complaints reported. ROS as stated above.    Vital Signs Last 24 Hrs  T(C): 37.5 (21 Feb 2022 08:00), Max: 39 (20 Feb 2022 16:21)  T(F): 99.5 (21 Feb 2022 08:00), Max: 102.2 (20 Feb 2022 16:21)  HR: 70 (21 Feb 2022 15:10) (70 - 78)  BP: 116/51 (21 Feb 2022 15:10) (95/47 - 119/61)  BP(mean): --  RR: 17 (21 Feb 2022 15:10) (17 - 18)  SpO2: 95% (21 Feb 2022 15:10) (91% - 99%)    GENERAL: NAD, Lying in the bed comfortably  HEAD:  Atraumatic, Normocephalic  EYES: EOMI, PERRLA, conjunctiva and sclera clear  ENT: Moist mucous membranes  NECK: Supple, No JVD  CHEST/LUNG: No crackles. +wheezing diffuse.  Unlabored respirations.   HEART: RRR.  No murmurs, rubs, or gallops  ABDOMEN: Bowel sounds present; Soft, Nontender, Nondistended. No hepatomegaly  EXTREMITIES:  2+ Peripheral Pulses  NERVOUS SYSTEM:  Alert & Oriented X3, speech clear. No deficits   MSK: FROM all 4 extremities, full and equal strength  SKIN: Ecchymosis noted on the upper extremities bilaterally    MEDICATIONS:  MEDICATIONS  (STANDING):  apixaban 5 milliGRAM(s) Oral every 12 hours  atorvastatin 40 milliGRAM(s) Oral at bedtime  budesonide 160 MICROgram(s)/formoterol 4.5 MICROgram(s) Inhaler 2 Puff(s) Inhalation two times a day  digoxin     Tablet 250 MICROGram(s) Oral daily  metoprolol tartrate 25 milliGRAM(s) Oral two times a day  pantoprazole    Tablet 40 milliGRAM(s) Oral two times a day  potassium chloride    Tablet ER 10 milliEquivalent(s) Oral daily  tiotropium 18 MICROgram(s) Capsule 1 Capsule(s) Inhalation daily    MEDICATIONS  (PRN):  acetaminophen     Tablet .. 650 milliGRAM(s) Oral every 6 hours PRN Temp greater or equal to 38.5C (101.3F), Moderate Pain (4 - 6)      LABS: All Labs Reviewed:                        8.8    4.36  )-----------( 300      ( 21 Feb 2022 11:34 )             26.3     02-21    136  |  102  |  14  ----------------------------<  132<H>  3.7   |  29  |  0.84    Ca    8.0<L>      21 Feb 2022 11:34            Blood Culture: 02-19 @ 08:14  Organism --  Gram Stain Blood -- Gram Stain --  Specimen Source Clean Catch Clean Catch (Midstream)  Culture-Blood --    02-19 @ 06:41  Organism --  Gram Stain Blood -- Gram Stain --  Specimen Source .Blood None  Culture-Blood --      I&O's Summary    20 Feb 2022 07:01  -  21 Feb 2022 07:00  --------------------------------------------------------  IN: 0 mL / OUT: 700 mL / NET: -700 mL      CAPILLARY BLOOD GLUCOSE      EKG/RADIOLOGY  < from: Xray Chest 1 View-PORTABLE IMMEDIATE (Xray Chest 1 View-PORTABLE IMMEDIATE .) (02.19.22 @ 06:33) >  Findings/  Impression: Status post CABG. status post pacemaker. No lung   consolidations. Trace right pleural effusion.    --- End of Report ---    < end of copied text >  < from: CT Angio Chest PE Protocol w/ IV Cont (02.13.22 @ 23:16) >  IMPRESSION:    No pulmonary embolism.    Mosaic attenuation pattern to the bilateral upper lobes which may be   related to edema or air trapping or small airways disease. Patchy   opacities in the left lower lobe and to a lesser extent in the right   lower lobe which may represent infection. Correlate clinically. Follow   these findings to resolution to exclude other processes.    < end of copied text >  < from: Xray Chest 1 View- PORTABLE-Urgent (Xray Chest 1 View- PORTABLE-Urgent .) (02.13.22 @ 22:49) >  IMPRESSION:   No radiographic evidence of active chest disease.    --- End of Report ---      < end of copied text >

## 2022-02-21 NOTE — PROGRESS NOTE ADULT - SUBJECTIVE AND OBJECTIVE BOX
CHIEF COMPLAINT:  Patient is a 86y old  Male who presents with a chief complaint of acute hypoxic respiratory failure  PNA (15 Feb 2022 16:25)      HPI: 2/15/22:  87 y/o male known to me with a PMHx of polycythemia vera, prostate CA, rheumatic fever, CAD s/p CABG,  chronic Afib s/p PPM , HTN, ? Prostate CA  presents to the ED c/o generalized weakness, and  SOB. The pt has been feeling extreme weakness and SOB associated with fever for the past four days; hence, his wife advised him to visit [facility] for further evaluation. He claims that his weakness was so severe that he could not get up from his chair or walk properly. In addition, his PO intake decreased drastically . Pt denied chest pain, nausea, vomiting, loc, history of fall, diarrhea, and SC bleeding.   Pt received chemotherapy 3 To 4 weeks back for his lung cancer, and subsequent scheduled chemotherapy is in one week. Then, last October, he was admitted to [facility] for GI bleeding.   Currently he denies anginal chest pains, palpitations, dizziness or syncope.  He had an echo today showing normal LV systolic function and LVEF approx. 55-60% with moderately dilated LA and mild MR, TR, AI and PI.  He has no new cardiac symptoms otherwise.    22:  Less SOB.  No anginal chest pains or other cardiac changes.  A-V pacing on the monitor.    22:  Slowly improving.  Remains afebrile.  H&H low but no obvious bleeding.  A-v pacing on the monitor.  No new cardiac symptoms.    22:  Feeling good.  A-V pacing on the monitor. No new cardiac symptoms.  Was scheduled for chemoRx at [place] next week but this was cancelled due to his PNA.    22;  Feeling better with less SOB.  A-V pacing on the monitor with occasional AF with controlled VR.  No new cardiac symptoms.  H/H 8.3 twice yesterday.        PMHx:  PAST MEDICAL & SURGICAL HISTORY:  Atrial fibrillation  Hypertension  CAD (coronary artery disease)-stent in   CABG  Pacemaker  Rheumatic fever-child traylor  Prostate ca-radiation treatment 5yrs ago  Symptomatic bradycardia  Polycythemia vera  Artificial cardiac pacemaker-  History of PTCA-with stent to RCA       FAMILY HISTORY:   FAMILY HISTORY:-  Family history of bronchiectasis (Mother)      ALLERGIES:  Allergies  No Known Allergies      REVIEW OF SYSTEMS:  10 point ROS was obtained  Pertinent positives and negatives are as above  All other review of systems is negative unless indicated above      Vital Signs Last 24 Hrs  T(C): 37.5 (2022 06:00), Max: 39 (2022 16:21)  T(F): 99.5 (2022 06:00), Max: 102.2 (2022 16:21)  HR: 77 (2022 06:00) (70 - 77)  BP: 119/61 (2022 06:00) (97/51 - 119/61)  RR: 17 (2022 06:00) (17 - 18)  SpO2: 98% (2022 19:35) (98% - 98%)      I&O's Summary    2022 07:01  -  15 Feb 2022 07:00  --------------------------------------------------------  IN: 829 mL / OUT: 1975 mL / NET: -1146 mL    15 Feb 2022 07:01  -  15 Feb 2022 20:06  --------------------------------------------------------  IN: 60.7 mL / OUT: 0 mL / NET: 60.7 mL        PHYSICAL EXAM:   Constitutional: NAD, awake and alert, well-developed  HEENT: PERR, EOMI, Normal Hearing, MMM  Neck: Soft and supple, No LAD, No JVD  Respiratory: Breath sounds are clear bilaterally, No wheezing, rales or rhonchi, PPM in left chest wall  Cardiovascular: S1 and S2, irregular rate and irregular rhythm, soft ANA M at LLSB and base as before, no gallops or rubs  Gastrointestinal: Bowel Sounds present, soft, nontender, nondistended, no guarding, no rebound  Extremities: No peripheral edema  Vascular: 2+ peripheral pulses  Neurological: A/O x 3, no focal deficits  Musculoskeletal: 5/5 strength b/l upper and lower extremities      MEDICATIONS  (STANDING):  apixaban 5 milliGRAM(s) Oral every 12 hours  atorvastatin 40 milliGRAM(s) Oral at bedtime  budesonide 160 MICROgram(s)/formoterol 4.5 MICROgram(s) Inhaler 2 Puff(s) Inhalation two times a day  digoxin     Tablet 250 MICROGram(s) Oral daily  metoprolol tartrate 25 milliGRAM(s) Oral two times a day  pantoprazole    Tablet 40 milliGRAM(s) Oral two times a day  potassium chloride    Tablet ER 10 milliEquivalent(s) Oral daily  tiotropium 18 MICROgram(s) Capsule 1 Capsule(s) Inhalation daily    MEDICATIONS  (PRN):  acetaminophen     Tablet .. 650 milliGRAM(s) Oral every 6 hours PRN Temp greater or equal to 38.5C (101.3F), Moderate Pain (4 - 6)      LABS: All Labs Reviewed:                        8.3    x     )-----------( x        ( 2022 18:01 )             25.0                           6.8    6.15  )-----------( 148      ( 2022 06:23 )             20.8                           7.9    4.49  )-----------( 96       ( 15 Feb 2022 06:59 )             23.9       02-20    136  |  103  |  15  ----------------------------<  91  3.2<L>   |  27  |  0.72    Ca    7.9      2022 07:19        02-17    139  |  109<H>  |  24<H>  ----------------------------<  143<H>  4.3   |  25  |  1.02    Ca    8.5      2022 06:23      02-16    135  |  104  |  23  ----------------------------<  167<H>  3.8   |  23  |  0.88    Ca    8.2<L>      2022 06:51    TPro  5.5<L>  /  Alb  1.8<L>  /  TBili  1.0  /  DBili  0.4<H>  /  AST  35  /  ALT  33  /  AlkPhos  57  02-15      02-15    135  |  105  |  17  ----------------------------<  161<H>  3.6   |  22  |  0.73    Ca    8.0<L>      15 Feb 2022 06:59    TPro  5.5<L>  /  Alb  1.8<L>  /  TBili  1.0  /  DBili  0.4<H>  /  AST  35  /  ALT  33  /  AlkPhos  57  02-15    PT/INR - ( 15 Feb 2022 06:59 )   PT: 18.3 sec;   INR: 1.61 ratio       PTT - ( 2022 21:55 )  PTT:33.8 sec    Troponin I, High Sensitivity (22 @ 21:55): 91.23    Serum Pro-Brain Natriuretic Peptide: 4288 pg/mL (02-15 @ 07:08)  Serum Pro-Brain Natriuretic Peptide: 5332 pg/mL ( @ 21:55)    Digoxin Level, Serum (. @ 06:41): 1.42 ng/mL   Digoxin Level, Serum (02.15. @ 06:59): .86 ng/mL     CULTURES:   22 & 22:  Organism --  Gram Stain Blood -- Gram Stain --  Specimen Source .Blood None  Culture-Blood --No growth to date.     22:  Organism --  Gram Stain Urine -- Gram Stain --  Specimen Source Clean Catch None  Culture-Urine --No growth       LIPID PROFILE     RADIOLOGY:    CXR:   Findings/  Impression: Status post CABG. status post pacemaker. No lung   consolidations. Trace right pleural effusion.    CXR: 22:  FINDINGS:  CATHETERS AND TUBES: None  PULMONARY: The visualized lungs are clear of airspace consolidations or effusions. No pneumothorax.  HEART/VASCULAR: The  heart is mildly enlarged in transverse diameter. Status postcoronary artery bypass graft procedure. Cardiac device wire leads are within right atrium and right ventricle. .  BONES: Visualized osseous structures are intact.  IMPRESSION:   No radiographic evidence of active chest disease.      CTA of Chest: 22:  FINDINGS:  LUNGS AND AIRWAYS: Patent central airways.  Right upper lobe postsurgical changes. Mosaic attenuation pattern to the bilateral upper lobes which may be related to edema or air trapping or small airways disease. Patchy opacities in the left lower lobe and to a lesser extent in the right lower lobe which may represent infection. Correlate clinically. Follow these findings to resolution to exclude other processes.  PLEURA: Trace bilateral pleural fluid.  MEDIASTINUM AND KATELYN: No lymphadenopathy.  VESSELS: No pulmonary embolism.  HEART: Cardiomegaly. Coronary artery calcifications. No pericardial effusion.  CHEST WALL AND LOWER NECK: Within normal limits.  VISUALIZED UPPER ABDOMEN: Within normal limits.  BONES: Sternotomy. Degenerative changes.  IMPRESSION:  No pulmonary embolism.  Mosaic attenuation pattern to the bilateral upper lobes which may be related to edema or air trapping or small airways disease. Patchy opacities in the left lower lobe and to a lesser extent in the right lower lobe which may represent infection. Correlate clinically. Follow these findings to resolution to exclude other processes.      EK22:  Wide QRS tachycardia  Right bundle branch block      TELEMETRY:  A-V pacing with underlying AF    ECHO:  2/15/22:  M-Mode Measurements (cm):   LVEDd: 5.66 cm            LVESd: 4.04 cm   IVSEd: 0.85 cm   LVPWd: 0.97 cm            AO Root Dimension: 3.8 cm                       ACS: 2.1 cm                             LA: 5.3 cm                             LVOT: 2.2 cm  Doppler Measurements:   AV Velocity:195 cm/s                MV Peak E-Wave: 68.6 cm/s   AV Peak Gradient: 15.21 mmHg        MV Peak A-Wave: 52.3 cm/s                                       MV E/A Ratio: 1.31 %   TR Velocity:305 cm/s                MV Peak Gradient: 1.88 mmHg   TR Gradient:37.21 mmHg   Estimated RAP:5 mmHg   RVSP:42 mmHg    Findings:  Mitral Valve:   Fibrocalcific changes noted to the mitral valve leaflets with preserved leaflet excursion.   Mild mitral regurgitation is present.    Aortic Valve:   Fibrocalcific changes noted to the Aortic valve leaflets with mildly reduced mobility but does not meet criteria for aortic stenosis.   Trace to mild aortic regurgitation is present.    Tricuspid Valve:   Normal appearing tricuspid valve structure.   Mild (1+) tricuspid valve regurgitation is present.   Mild pulmonary hypertension.    Pulmonic Valve:   Normal appearing pulmonic valve structure and function.    Left Atrium:   The left atrium is moderately dilated.    Left Ventricle:   The left ventricle is normal in size, wall thickness, wall motion and contractility.   Estimated left ventricular ejection fraction is 55-60 %.    Right Atrium:   Normal appearing right atrium.    Right Ventricle:   Normal appearing right ventricle structure and function.   A device wire is seen in the RV and RA.    Pericardial Effusion:   No evidence of pericardial effusion.    Miscellaneous:   IVC was not visualized.   No subcostal window seen.    Summary:   The left ventricle is normal in size, wall thickness, wall motion and contractility.   Estimated left ventricular ejection fraction is 55-60 %.   The left atrium is moderately dilated.   Normal appearing right atrium.   Normal appearing right ventricle structure and function.   A device wire is seen in the RV and RA.   Fibrocalcific changes noted to the Aortic valve leaflets with mildly reduced mobility but does not meet criteria for aortic stenosis.   Trace to mild aortic regurgitation is present.   Normal appearing tricuspid valve structure.   Mild (1+) tricuspid valve regurgitation is present.   Mild pulmonary hypertension.   No evidence of pericardial effusion.    Signature:   ----------------------------------------------------------------   Electronically signed by Kory Ibarra MD(Interpreting physician)   on 02/15/2022 06:19 PM   ----------------------------------------------------------------

## 2022-02-21 NOTE — PROGRESS NOTE ADULT - ASSESSMENT
87 y/o male with a PMHx of polycythemia vera, prostate CA, rheumatic fever, CAD s/p CABG,  Afib s/p PPM , HTN, ? Prostate CA c/o generalized weakness, and  SOB. He is evaluated for:    # Severe sepsis and acute respiratory failure (Improved)  - Currently improved with better breathing, SaO2 in 90s on 3 L NC  - WBC WNL, BCX Urine Cx negative, Lactate now WNL  - S/p 7 day course of cefepime. Continue to observe off antibiotics as per ID  - If pt spikes fever, will re-culture as per Dr. Cristina  - C/w pulmonology recs-Dr Montemayor  - C/w cardiology recs-  Dr. Wyatt    # Chronic CHF  -stable, no sxs of overt CHF  -Downtrending BNP 5332->4288  -TTE: EF 55-60%, mod LA dilation  - Continue to monitor     #Elevated troponin  - Type 2 MI  -Probably 2/2 to demand ischemia     #Acute Blood Loss Macrocytic anemia   -B12 mildly elevated, folate wnl   -Possibly 2/2 antineoplastics vs GIB  -s/p 1 U PRBC 2/17 after report of bloody stool, Hgb drop to 6.8  -GI recs: PPI BID x 8 weeks, no EGD now; possible EGD later as outpatient   -Hgb 10>8.3>8.8. No further GIB episodes since eliquis was restarted.  -Stop Eliquis if GI bleeding occurs again. If HgB less than 7, will transfuse      # Hyponatremia  -mild, received IVF, will monitor     #Cad, s/p CABG   - recent cath negative  - C/w beta blocker    #Chronic Afib   -AV pacing on monitor   -cont Digoxin, level 1.42 on 2/19. Continue to monitor  -cont  lopressor 25 mg BID   -Pt is not taking amiodarone due to toxicity  -[id number]: 5      #DVT prophylaxis   -Eliquis     #GOC  -DNR/DNI  -MOLST form signed and in chart     Case discussed with Dr. Fuentes

## 2022-02-22 LAB
ANION GAP SERPL CALC-SCNC: 5 MMOL/L — SIGNIFICANT CHANGE UP (ref 5–17)
BUN SERPL-MCNC: 10 MG/DL — SIGNIFICANT CHANGE UP (ref 7–23)
CALCIUM SERPL-MCNC: 8 MG/DL — LOW (ref 8.5–10.1)
CHLORIDE SERPL-SCNC: 103 MMOL/L — SIGNIFICANT CHANGE UP (ref 96–108)
CO2 SERPL-SCNC: 27 MMOL/L — SIGNIFICANT CHANGE UP (ref 22–31)
CREAT SERPL-MCNC: 0.72 MG/DL — SIGNIFICANT CHANGE UP (ref 0.5–1.3)
DIGOXIN SERPL-MCNC: 1.51 NG/ML — SIGNIFICANT CHANGE UP (ref 0.8–2)
GLUCOSE SERPL-MCNC: 95 MG/DL — SIGNIFICANT CHANGE UP (ref 70–99)
HCT VFR BLD CALC: 28.8 % — LOW (ref 39–50)
HGB BLD-MCNC: 9.1 G/DL — LOW (ref 13–17)
MAGNESIUM SERPL-MCNC: 2.1 MG/DL — SIGNIFICANT CHANGE UP (ref 1.6–2.6)
MCHC RBC-ENTMCNC: 31.5 PG — SIGNIFICANT CHANGE UP (ref 27–34)
MCHC RBC-ENTMCNC: 31.6 GM/DL — LOW (ref 32–36)
MCV RBC AUTO: 99.7 FL — SIGNIFICANT CHANGE UP (ref 80–100)
PHOSPHATE SERPL-MCNC: 2 MG/DL — LOW (ref 2.5–4.5)
PLATELET # BLD AUTO: 344 K/UL — SIGNIFICANT CHANGE UP (ref 150–400)
POTASSIUM SERPL-MCNC: 4.1 MMOL/L — SIGNIFICANT CHANGE UP (ref 3.5–5.3)
POTASSIUM SERPL-SCNC: 4.1 MMOL/L — SIGNIFICANT CHANGE UP (ref 3.5–5.3)
RBC # BLD: 2.89 M/UL — LOW (ref 4.2–5.8)
RBC # FLD: 19.9 % — HIGH (ref 10.3–14.5)
SARS-COV-2 RNA SPEC QL NAA+PROBE: SIGNIFICANT CHANGE UP
SODIUM SERPL-SCNC: 135 MMOL/L — SIGNIFICANT CHANGE UP (ref 135–145)
WBC # BLD: 5.13 K/UL — SIGNIFICANT CHANGE UP (ref 3.8–10.5)
WBC # FLD AUTO: 5.13 K/UL — SIGNIFICANT CHANGE UP (ref 3.8–10.5)

## 2022-02-22 PROCEDURE — 99233 SBSQ HOSP IP/OBS HIGH 50: CPT

## 2022-02-22 PROCEDURE — 71045 X-RAY EXAM CHEST 1 VIEW: CPT | Mod: 26

## 2022-02-22 PROCEDURE — 71250 CT THORAX DX C-: CPT | Mod: 26

## 2022-02-22 RX ORDER — AZITHROMYCIN 500 MG/1
500 TABLET, FILM COATED ORAL EVERY 24 HOURS
Refills: 0 | Status: DISCONTINUED | OUTPATIENT
Start: 2022-02-22 | End: 2022-02-23

## 2022-02-22 RX ORDER — FUROSEMIDE 40 MG
40 TABLET ORAL DAILY
Refills: 0 | Status: DISCONTINUED | OUTPATIENT
Start: 2022-02-22 | End: 2022-02-25

## 2022-02-22 RX ADMIN — BUDESONIDE AND FORMOTEROL FUMARATE DIHYDRATE 2 PUFF(S): 160; 4.5 AEROSOL RESPIRATORY (INHALATION) at 08:07

## 2022-02-22 RX ADMIN — Medication 250 MICROGRAM(S): at 11:18

## 2022-02-22 RX ADMIN — PANTOPRAZOLE SODIUM 40 MILLIGRAM(S): 20 TABLET, DELAYED RELEASE ORAL at 11:17

## 2022-02-22 RX ADMIN — Medication 40 MILLIGRAM(S): at 15:03

## 2022-02-22 RX ADMIN — APIXABAN 5 MILLIGRAM(S): 2.5 TABLET, FILM COATED ORAL at 11:18

## 2022-02-22 RX ADMIN — PANTOPRAZOLE SODIUM 40 MILLIGRAM(S): 20 TABLET, DELAYED RELEASE ORAL at 21:25

## 2022-02-22 RX ADMIN — BUDESONIDE AND FORMOTEROL FUMARATE DIHYDRATE 2 PUFF(S): 160; 4.5 AEROSOL RESPIRATORY (INHALATION) at 20:36

## 2022-02-22 RX ADMIN — TIOTROPIUM BROMIDE 1 CAPSULE(S): 18 CAPSULE ORAL; RESPIRATORY (INHALATION) at 08:39

## 2022-02-22 RX ADMIN — Medication 10 MILLIEQUIVALENT(S): at 11:17

## 2022-02-22 RX ADMIN — APIXABAN 5 MILLIGRAM(S): 2.5 TABLET, FILM COATED ORAL at 21:25

## 2022-02-22 RX ADMIN — ATORVASTATIN CALCIUM 40 MILLIGRAM(S): 80 TABLET, FILM COATED ORAL at 21:25

## 2022-02-22 NOTE — PROGRESS NOTE ADULT - SUBJECTIVE AND OBJECTIVE BOX
INTERVAL HPI/OVERNIGHT EVENTS:  Patient S&E at bedside. Overnight, pt still spiking fevers 101.8 yesterday at 3 pm, 100.8 at 6 am and again at 845 am, BP 90s/50s. Labs reviewed, WBC 5, Hb 9.1, plt 344k. Pt reports having more energy today and feeling stronger despite fever. Good appetite.     PAST MEDICAL & SURGICAL HISTORY:  Atrial fibrillation    Hypertension    CAD (coronary artery disease)  stent in 2007    Pacemaker    Rheumatic fever  child traylor    Prostate ca  radiation treatment 5yrs ago    Symptomatic bradycardia    Polycythemia vera    Artificial cardiac pacemaker  2006    History of PTCA  with stent to RCA 2007        FAMILY HISTORY:  Family history of bronchiectasis (Mother)        VITAL SIGNS:  T(F): 100.8 (02-22-22 @ 08:42)  HR: 72 (02-22-22 @ 11:15)  BP: 96/59 (02-22-22 @ 11:15)  RR: 17 (02-22-22 @ 08:42)  SpO2: 93% (02-22-22 @ 08:42)  Wt(kg): --    PHYSICAL EXAM:    Constitutional: NAD, on nc eating breakfast  Respiratory: cta b/l  Cardiovascular: irregular rhythm  Gastrointestinal: soft, NTND,  Extremities: no c/c/e  Neurological: AAOx3      MEDICATIONS  (STANDING):  apixaban 5 milliGRAM(s) Oral every 12 hours  atorvastatin 40 milliGRAM(s) Oral at bedtime  budesonide 160 MICROgram(s)/formoterol 4.5 MICROgram(s) Inhaler 2 Puff(s) Inhalation two times a day  digoxin     Tablet 250 MICROGram(s) Oral daily  furosemide    Tablet 40 milliGRAM(s) Oral daily  metoprolol tartrate 25 milliGRAM(s) Oral two times a day  pantoprazole    Tablet 40 milliGRAM(s) Oral two times a day  potassium chloride    Tablet ER 10 milliEquivalent(s) Oral daily  tiotropium 18 MICROgram(s) Capsule 1 Capsule(s) Inhalation daily    MEDICATIONS  (PRN):  acetaminophen     Tablet .. 650 milliGRAM(s) Oral every 6 hours PRN Temp greater or equal to 38.5C (101.3F), Moderate Pain (4 - 6)      Allergies    No Known Allergies    Intolerances        LABS:                        9.1    5.13  )-----------( 344      ( 22 Feb 2022 07:59 )             28.8     02-22    135  |  103  |  10  ----------------------------<  95  4.1   |  27  |  0.72    Ca    8.0<L>      22 Feb 2022 07:59  Phos  2.0     02-22  Mg     2.1     02-22            RADIOLOGY & ADDITIONAL TESTS:  Studies reviewed.

## 2022-02-22 NOTE — PROGRESS NOTE ADULT - ATTENDING COMMENTS
Lung Cancer with METS  SP Cefepime for PNA  Still spiking Temps  ID to FU as per ONC  Repeat fever mallory pending.  CT shows edema and pleural effusion  Start Lasix and monitor cr.

## 2022-02-22 NOTE — PROGRESS NOTE ADULT - ASSESSMENT
87 y/o male with a PMHx of polycythemia vera, prostate CA, rheumatic fever, CAD s/p CABG,  Afib s/p PPM , HTN, NSCLC adenocarcinoma stage 4 on chemo, presented on 02/13 with generalized weakness, and  SOB. Patient admitted with sepsis and acute respiratory failure, treated for HAP with IV cefepime x7 days. Patient afebrile yesterday but febrile this morning to 38.2C rectally, SpO2 dropped to 93% on 3LNC (normally 98% on 3LNC). Repeat chest xray and CT chest ordered to r/o infectious process or lung abscess s/p PNA treatment, results pending. Pending results patient to remain on unit vs discharge to subacute rehab.     # Severe sepsis and acute respiratory failure (Improved)  - Currently improved with better breathing, SaO2 in 90s on 3 L NC  - WBC WNL, BCX Urine Cx negative, Lactate now WNL  - S/p 7 day course of cefepime. Continue to observe off antibiotics as per ID  - If pt spikes fever, will re-culture as per Dr. Cristina  - C/w pulmonology recs-Dr Montemayor  - C/w cardiology recs-  Dr. Wyatt  - fever 2/22AM, repeat CXR and CT chest, results pending    # Chronic CHF  -stable, no sxs of overt CHF  -Downtrending BNP 5332->4288  -TTE: EF 55-60%, mod LA dilation  - Continue to monitor     #Elevated troponin  - Type 2 MI  -Probably 2/2 to demand ischemia     #Acute Blood Loss Macrocytic anemia   -B12 mildly elevated, folate wnl   -Possibly 2/2 antineoplastics vs GIB  -s/p 1 U PRBC 2/17 after report of bloody stool, Hgb drop to 6.8  -GI recs: PPI BID x 8 weeks, no EGD now; possible EGD later as outpatient   -Hgb 10>8.3>8.8. No further GIB episodes since eliquis was restarted.  -Stop Eliquis if GI bleeding occurs again. If HgB less than 7, will transfuse      # Hyponatremia  -mild, received IVF, will monitor     #Cad, s/p CABG   - recent cath negative  - C/w beta blocker    #Chronic Afib   -AV pacing on monitor   -cont Digoxin, level 1.42 on 2/19. Continue to monitor  -cont  lopressor 25 mg BID   -Pt is not taking amiodarone due to toxicity  -[id number]: 5      #DVT prophylaxis   -Eliquis  - SCD    #Code Status  -DNR/DNI  -MOLST form signed and in chart  85 y/o male with a PMHx of polycythemia vera, prostate CA, rheumatic fever, CAD s/p CABG,  Afib s/p PPM , HTN, NSCLC adenocarcinoma stage 4 on chemo, presented on 02/13 with generalized weakness, and  SOB. Patient admitted with sepsis and acute respiratory failure, treated for HAP with IV cefepime x7 days. Patient afebrile yesterday but febrile this morning to 38.2C rectally, SpO2 dropped to 93% on 3LNC (normally 98% on 3LNC). Repeat chest xray and CT chest ordered to r/o infectious process or lung abscess s/p PNA treatment. CT chest results show increase pulmonary edema and pleural effusions, will start patient on furosemide for light diuresis. Plan for patient to remain in hospital pending repeat blood and urine cultures 2/2 fever this AM.     # Severe sepsis and acute respiratory failure (Improved)  - Currently improved with better breathing, SaO2 in 90s on 3 L NC  - WBC WNL, BCX Urine Cx negative, Lactate now WNL  - S/p 7 day course of cefepime. Continue to observe off antibiotics as per ID  - If pt spikes fever, will re-culture as per Dr. Cristina  - C/w pulmonology recs-Dr Montemayor  - C/w cardiology recs-  Dr. Wyatt  - fever 2/22AM, repeat CXR and CT chest showing increased pulmonary edema and pleural effusions from 2/13 CTA chest, will start patient on oral furosemide  - repeat blood and urine cultures/UA pending    # Chronic CHF  -stable, no sxs of overt CHF  -Downtrending BNP 5332->4288  -TTE: EF 55-60%, mod LA dilation  - Continue to monitor     #Elevated troponin  - Type 2 MI  -Probably 2/2 to demand ischemia     #Acute Blood Loss Macrocytic anemia   -B12 mildly elevated, folate wnl   -Possibly 2/2 antineoplastics vs GIB  -s/p 1 U PRBC 2/17 after report of bloody stool, Hgb drop to 6.8  -GI recs: PPI BID x 8 weeks, no EGD now; possible EGD later as outpatient   -Hgb 10>8.3>8.8. No further GIB episodes since eliquis was restarted.  -Stop Eliquis if GI bleeding occurs again. If HgB less than 7, will transfuse      # Hyponatremia  -mild, received IVF, will monitor     #Cad, s/p CABG   - recent cath negative  - C/w beta blocker    #Chronic Afib   -AV pacing on monitor   -cont Digoxin, level 1.42 on 2/19. Continue to monitor  -cont  lopressor 25 mg BID   -Pt is not taking amiodarone due to toxicity  -[id number]: 5      #DVT prophylaxis   -Eliquis  - SCD    #Code Status  -DNR/DNI  -MOLST form signed and in chart

## 2022-02-22 NOTE — PROGRESS NOTE ADULT - SUBJECTIVE AND OBJECTIVE BOX
Patient is a 86y old  Male who presents with a chief complaint of acute hypoxic respiratory failure  PNA (19 Feb 2022 13:52)      HPI:  87 y/o male with a PMHx of polycythemia vera, prostate CA, rheumatic fever, CAD s/p CABG,  Afib s/p PPM , HTN, ? Prostate CA  presents to the ED c/o generalized weakness, and  SOB. The pt has been feeling extreme weakness and SOB associated with fever for the past four days; hence, his wife advised him to visit [facility] for further evaluation.  Received Keytruda in the past for lung cancer  Per chart review from [facility], patient was seen by Dr Chow 12/2021  History of lung cancer, s/p right upper wedge resection 8/2016 with Dr Solorio.  Noted to have Adenocarcinoma that was 1.2cm  Treated at [facility] with chemotherapy and immunotherapy.  Developed rash after chemotherapy and it was stopped.  History of PAF as well, sees Dr Jett, treated with amiodarone as well.  PFT revealed mild obstructive ventilatory defect. no air trapping hyperinflation, DLCO moderately reduced.  CT Chest at [facility] 1/2022 revealed that in the left lung there is a groundglass density with a centrally dilated bronchus in the LLL  measuring 1.8cm in greatest dimension and in the medial aspect of the left lower lobe.   In the medial aspect of the RUL there is a spiculated nodular density with the central areas of aeration that measures 2.6cm by 1.3cm (neoplastic finding cannot be excluded)  Thereafter underwent a new CT Chest this admission that revealed Patent central airways.  Right upper lobe postsurgical   changes. Mosaic attenuation pattern to the bilateral upper lobes which may be related to edema or air trapping or small airways disease. Patchy   opacities in the left lower lobe and to a lesser extent in the right lower lobe which may represent infection.   Noted to be febrile overnight    2/22  Patient evaluated this morning, still noted to have febrile episodes overnight   CT Chest performed revealed Increased lung findings since 2/13/2022, favored to represent pulmonary edema.  Increased small pleural effusions. Stable subcentimeter nodules in the right lower lobe.     PAST MEDICAL & SURGICAL HISTORY:  Atrial fibrillation    Hypertension    CAD (coronary artery disease)  stent in 2007    Pacemaker    Rheumatic fever  child traylor    Prostate ca  radiation treatment 5yrs ago    Symptomatic bradycardia    Polycythemia vera    Artificial cardiac pacemaker  2006    History of PTCA  with stent to RCA 2007        MEDICATIONS  (STANDING):  apixaban 5 milliGRAM(s) Oral every 12 hours  atorvastatin 40 milliGRAM(s) Oral at bedtime  cefepime  Injectable. 1000 milliGRAM(s) IV Push every 8 hours  digoxin     Tablet 250 MICROGram(s) Oral daily  metoprolol tartrate 25 milliGRAM(s) Oral two times a day  pantoprazole    Tablet 40 milliGRAM(s) Oral two times a day  potassium chloride    Tablet ER 10 milliEquivalent(s) Oral daily    MEDICATIONS  (PRN):  acetaminophen     Tablet .. 650 milliGRAM(s) Oral every 6 hours PRN Temp greater or equal to 38.5C (101.3F), Moderate Pain (4 - 6)        FAMILY HISTORY:  Family history of bronchiectasis (Mother)        SOCIAL HISTORY:  ***    REVIEW OF SYSTEM:  dyspnea, fatigue    Vital Signs Last 24 Hrs  T(C): 38.3 (20 Feb 2022 05:15), Max: 38.6 (19 Feb 2022 14:40)  T(F): 101 (20 Feb 2022 05:15), Max: 101.4 (19 Feb 2022 14:40)  HR: 72 (19 Feb 2022 19:35) (69 - 72)  BP: 128/64 (19 Feb 2022 19:35) (97/55 - 128/64)  BP(mean): --  RR: 18 (19 Feb 2022 19:35) (17 - 18)  SpO2: 100% (19 Feb 2022 19:35) (98% - 100%)    I&O's Summary    19 Feb 2022 07:01  -  20 Feb 2022 07:00  --------------------------------------------------------  IN: 0 mL / OUT: 2400 mL / NET: -2400 mL      PHYSICAL EXAM  General Appearance: cooperative, no acute distress,   HEENT: PERRL, conjunctiva clear, EOM's intact, non injected pharynx, no exudate, TM   normal  Neck: Supple, , no adenopathy, thyroid: not enlarged, no carotid bruit or JVD  Back: Symmetric, no  tenderness,no soft tissue tenderness  Lungs: coarse bilaterally   Heart: Regular rate and rhythm, S1, S2 normal, no murmur, rub or gallop  Abdomen: Soft, non-tender, bowel sounds active , no hepatosplenomegaly  Extremities: no cyanosis or edema, no joint swelling  Skin: Skin color, texture normal, no rashes   Neurologic: Alert and oriented X3 , cranial nerves intact, sensory and motor normal,    ECG:    LABS:                          8.3    4.14  )-----------( 256      ( 20 Feb 2022 07:19 )             25.6     02-19    134<L>  |  102  |  15  ----------------------------<  96  4.8   |  25  |  0.90    Ca    8.6      19 Feb 2022 06:41  Phos  2.6     02-19  Mg     2.3     02-19    TPro  6.3  /  Alb  2.3<L>  /  TBili  1.4<H>  /  DBili  x   /  AST  50<H>  /  ALT  61  /  AlkPhos  74  02-19          Pro BNP  4288 02-15 @ 07:08  D Dimer  -- 02-15 @ 07:08  Pro BNP  5332 02-13 @ 21:55  D Dimer  -- 02-13 @ 21:55              RADIOLOGY & ADDITIONAL STUDIES:

## 2022-02-22 NOTE — PROGRESS NOTE ADULT - SUBJECTIVE AND OBJECTIVE BOX
JESSICA HOWARD  86y  [id number]    CHIEF COMPLAINT: SOB, weakness    INTERVAL HISTORY: Patient is AOx3, well appearing and cheerful. Patient c/o generalized weakness this morning, denies any other complaints. Patient denies CP, SOB, HA, dizziness, chills, abdominal pain, N/V/D, back pain, leg pain or swelling. Patient febrile this morning rectal temp 38.2C.    ROS: All other systems reviewed and found to be negative with the exception of what has been described above.    VITALS: T(C): 38.2 (02-22-22 @ 08:42), Max: 38.8 (02-21-22 @ 15:10)  HR: 82 (02-22-22 @ 08:52) (70 - 82)  BP: 99/55 (02-22-22 @ 08:42) (95/47 - 118/59)  RR: 17 (02-22-22 @ 08:42) (16 - 18)  SpO2: 93% (02-22-22 @ 08:42) (93% - 98%)  PHYSICAL EXAM: VITALS:  T(F): 100.8 (02-22-22 @ 08:42), Max: 101.8 (02-21-22 @ 15:10)  HR: 82 (02-22-22 @ 08:52) (70 - 82)  BP: 99/55 (02-22-22 @ 08:42) (95/47 - 118/59)  RR: 17 (02-22-22 @ 08:42) (16 - 18)  SpO2: 93% (02-22-22 @ 08:42) (93% - 98%)  Wt(kg): --    I&O's Summary      CAPILLARY BLOOD GLUCOSE          PHYSICAL EXAM:  GENERAL: well appearing 85yo male  HEENT:  pupils equal and reactive, EOMI, no oropharyngeal lesions, erythema, exudates, oral thrush  NECK:   supple, no carotid bruits, no palpable lymph nodes, no thyromegaly  CV:  +S1, +S2, regular, no murmurs or rubs; left chest wall PPM  RESP:   expiratory wheeze LLL/RLL, breathing non-labored  BREAST:  not examined  GI:  abdomen soft, non-tender, non-distended, normal BS, no bruits, no abdominal masses, no palpable masses  RECTAL:  not examined  :  not examined  MSK:   normal muscle tone, no atrophy, no rigidity, no contractions  EXT:  no clubbing, no cyanosis, no edema, no calf pain, swelling or erythema  VASCULAR:  pulses equal and symmetric in the upper and lower extremities  NEURO:  AAOX3, no focal neurological deficits, follows all commands, able to move extremities spontaneously  SKIN:  no ulcers, lesions or rashes, skin tear noted to left elbow, xeroform in place    LABS:                            9.1    5.13  )-----------( 344      ( 22 Feb 2022 07:59 )             28.8     02-22    135  |  103  |  10  ----------------------------<  95  4.1   |  27  |  0.72    Ca    8.0<L>      22 Feb 2022 07:59  Phos  2.0     02-22  Mg     2.1     02-22                  Lactate, Blood: 1.8 mmol/L (02-21 @ 18:43)                              MICRO:      IMAGING:    CARDIAC TESTING:    PROCEDURES:    MEDS: MEDICATIONS  (STANDING):  apixaban 5 milliGRAM(s) Oral every 12 hours  atorvastatin 40 milliGRAM(s) Oral at bedtime  budesonide 160 MICROgram(s)/formoterol 4.5 MICROgram(s) Inhaler 2 Puff(s) Inhalation two times a day  digoxin     Tablet 250 MICROGram(s) Oral daily  metoprolol tartrate 25 milliGRAM(s) Oral two times a day  pantoprazole    Tablet 40 milliGRAM(s) Oral two times a day  potassium chloride    Tablet ER 10 milliEquivalent(s) Oral daily  tiotropium 18 MICROgram(s) Capsule 1 Capsule(s) Inhalation daily    MEDICATIONS  (PRN):  acetaminophen     Tablet .. 650 milliGRAM(s) Oral every 6 hours PRN Temp greater or equal to 38.5C (101.3F), Moderate Pain (4 - 6)          Code status: DNR/DNI  DVT px: Eliquis / SCD  Dispo: JESSICA HOWARD  86y  [id number]    CHIEF COMPLAINT: SOB, weakness    INTERVAL HISTORY: Patient is AOx3, well appearing and cheerful. Patient c/o generalized weakness this morning, denies any other complaints. Patient denies CP, SOB, HA, dizziness, chills, abdominal pain, N/V/D, back pain, leg pain or swelling. Patient febrile this morning rectal temp 38.2C.    ROS: All other systems reviewed and found to be negative with the exception of what has been described above.    VITALS: T(C): 38.2 (02-22-22 @ 08:42), Max: 38.8 (02-21-22 @ 15:10)  HR: 82 (02-22-22 @ 08:52) (70 - 82)  BP: 99/55 (02-22-22 @ 08:42) (95/47 - 118/59)  RR: 17 (02-22-22 @ 08:42) (16 - 18)  SpO2: 93% (02-22-22 @ 08:42) (93% - 98%)  PHYSICAL EXAM: VITALS:  T(F): 100.8 (02-22-22 @ 08:42), Max: 101.8 (02-21-22 @ 15:10)  HR: 82 (02-22-22 @ 08:52) (70 - 82)  BP: 99/55 (02-22-22 @ 08:42) (95/47 - 118/59)  RR: 17 (02-22-22 @ 08:42) (16 - 18)  SpO2: 93% (02-22-22 @ 08:42) (93% - 98%)  Wt(kg): --    I&O's Summary      CAPILLARY BLOOD GLUCOSE          PHYSICAL EXAM:  GENERAL: well appearing 87yo male  HEENT:  pupils equal and reactive, EOMI, no oropharyngeal lesions, erythema, exudates, oral thrush  NECK:   supple, no carotid bruits, no palpable lymph nodes, no thyromegaly  CV:  +S1, +S2, regular, no murmurs or rubs; left chest wall PPM  RESP:   expiratory wheeze LLL/RLL, breathing non-labored  BREAST:  not examined  GI:  abdomen soft, non-tender, non-distended, normal BS, no bruits, no abdominal masses, no palpable masses  RECTAL:  not examined  :  not examined  MSK:   normal muscle tone, no atrophy, no rigidity, no contractions  EXT:  no clubbing, no cyanosis, no edema, no calf pain, swelling or erythema  VASCULAR:  pulses equal and symmetric in the upper and lower extremities  NEURO:  AAOX3, no focal neurological deficits, follows all commands, able to move extremities spontaneously  SKIN:  no ulcers, lesions or rashes, skin tear noted to left elbow, xeroform in place    LABS:                            9.1    5.13  )-----------( 344      ( 22 Feb 2022 07:59 )             28.8     02-22    135  |  103  |  10  ----------------------------<  95  4.1   |  27  |  0.72    Ca    8.0<L>      22 Feb 2022 07:59  Phos  2.0     02-22  Mg     2.1     02-22                  Lactate, Blood: 1.8 mmol/L (02-21 @ 18:43)                              MICRO:      IMAGING:    CARDIAC TESTING:    PROCEDURES:    MEDS: MEDICATIONS  (STANDING):  apixaban 5 milliGRAM(s) Oral every 12 hours  atorvastatin 40 milliGRAM(s) Oral at bedtime  budesonide 160 MICROgram(s)/formoterol 4.5 MICROgram(s) Inhaler 2 Puff(s) Inhalation two times a day  digoxin     Tablet 250 MICROGram(s) Oral daily  furosemide Tablet 40 milliGRAM(s) Oral daily  metoprolol tartrate 25 milliGRAM(s) Oral two times a day  pantoprazole    Tablet 40 milliGRAM(s) Oral two times a day  potassium chloride    Tablet ER 10 milliEquivalent(s) Oral daily  tiotropium 18 MICROgram(s) Capsule 1 Capsule(s) Inhalation daily    MEDICATIONS  (PRN):  acetaminophen     Tablet .. 650 milliGRAM(s) Oral every 6 hours PRN Temp greater or equal to 38.5C (101.3F), Moderate Pain (4 - 6)          Code status: DNR/DNI  DVT px: Eliquis / SCD  Dispo:

## 2022-02-22 NOTE — PROGRESS NOTE ADULT - ASSESSMENT
1) Dyspnea  2) Adenocarcinoma (unclear full staging)  3) Abnormal CT Chest  4) COPD    87 y/o male with a PMHx of polycythemia vera, prostate CA, rheumatic fever, CAD s/p CABG,  Afib s/p PPM , HTN, ? Prostate CA  presents to the ED c/o generalized weakness, and  SOB. The pt has been feeling extreme weakness and SOB associated with fever for the past four days; hence, his wife advised him to visit [facility] for further evaluation.  Received Keytruda in the past for lung cancer  Per chart review from [facility], patient was seen by Dr Chow 12/2021  History of lung cancer, s/p right upper wedge resection 8/2016 with Dr Solorio.  Noted to have Adenocarcinoma that was 1.2cm  Treated at [facility] with chemotherapy and immunotherapy.  Developed rash after chemotherapy and it was stopped.  History of PAF as well, sees Dr Jett, treated with amiodarone as well.  PFT revealed mild obstructive ventilatory defect. no air trapping hyperinflation, DLCO moderately reduced.  CT Chest at [facility] 1/2022 revealed that in the left lung there is a groundglass density with a centrally dilated bronchus in the LLL  measuring 1.8cm in greatest dimension and in the medial aspect of the left lower lobe.   In the medial aspect of the RUL there is a spiculated nodular density with the central areas of aeration that measures 2.6cm by 1.3cm (neoplastic finding cannot be excluded)  Thereafter underwent a new CT Chest this admission that revealed Patent central airways.  Right upper lobe postsurgical changes. Mosaic attenuation pattern to the bilateral upper lobes which may be related to edema or air trapping or small airways disease. Patchy opacities in the left lower lobe and to a lesser extent in the right lower lobe which may represent infection.   CT Chest revealed Patient evaluated this morning, still noted to have febrile episodes overnight   CT Chest performed revealed Increased lung findings since 2/13/2022, favored to represent pulmonary edema.  Increased small pleural effusions. Stable subcentimeter nodules in the right lower lobe.   In terms of etiology of the mosaic attenuation/air trapping/vascular disease on CT Chest, the thought process is that it is likely small airway disease   Appreciate ID recommendations  Patient states that the Symbicort 2 puffs BID and Spiriva have been helping  May need CT Abdomen if he continues to have febrile episodes. It is also possible for fevers to occur two months after the last dose of immunotherapy so if the fevers do in fact persist, will consider starting Steroids   Will discuss further with hospitalist service

## 2022-02-22 NOTE — PROGRESS NOTE ADULT - ASSESSMENT
This is an 86 year old male with history of NSCLC ADENOCARINOMA on Pemetrexed MONOTHERAPY now admitted for PNA     NSCLC   - now on active treatment with Dr. Frausto   - patient on PEMETREXED   - PATIENT HAS NOT RECEIVED IMMUNOTHERAPY SINCE beginning of JANUARY, Doubt I/O as source of recurrent fevers  - patient likely cytopenic from recent chemotherapy administration - w/ pemetrexed    Sepsis secondary to PNA   - CEFEPIME has been discontinued- pt still spiking fevers overnight   - possible fever secondary to underlying disease yet would consider atypical organisms perhaps allowing cultures to continue to grow   - ID currently following - discussed with ID - CEFEPIME d/lady  - CTA No pulmonary embolism. Patchy opacities in the left lower lobe and to a lesser extent in the right lower lobe which may represent infection.   - re-cultured today- consider atypical coverage with azithromycin  - ID to see again- discussed with Dr. Verdugo (she will see pt in am)  - CXR ordered  - CT chest- Increased largely central ground-glass and interlobular septal thickening in all lobes of the right lung and left upper lobe. Stable few subcentimeter nodules in the right lower lobe. Increased small pleural effusions.  - lasix given    ANEMIA   - possibly secondary to antineoplastic therapy vs GIB- pt has not received pRBC transfusion during therapy before  - seen by GI no plan for endoscopy FOBT negative- c/w protonix  - Hb dropped yesterday from 10.1->8.3, was 8.3 --> Hb 8.8->9.1 today- continue to trend as pt back on eliquis    Dr. Chad Cristina  cell: [phone]  Weekends and nights please call [phone] for MD on call  [facility]  Hematology/Oncology

## 2022-02-22 NOTE — PROGRESS NOTE ADULT - SUBJECTIVE AND OBJECTIVE BOX
CHIEF COMPLAINT:  Patient is a 86y old  Male who presents with a chief complaint of acute hypoxic respiratory failure  PNA (15 Feb 2022 16:25)      HPI: 2/15/22:  87 y/o male known to me with a PMHx of polycythemia vera, prostate CA, rheumatic fever, CAD s/p CABG,  chronic Afib s/p PPM , HTN, ? Prostate CA  presents to the ED c/o generalized weakness, and  SOB. The pt has been feeling extreme weakness and SOB associated with fever for the past four days; hence, his wife advised him to visit [facility] for further evaluation. He claims that his weakness was so severe that he could not get up from his chair or walk properly. In addition, his PO intake decreased drastically . Pt denied chest pain, nausea, vomiting, loc, history of fall, diarrhea, and IA bleeding.   Pt received chemotherapy 3 To 4 weeks back for his lung cancer, and subsequent scheduled chemotherapy is in one week. Then, last October, he was admitted to [facility] for GI bleeding.   Currently he denies anginal chest pains, palpitations, dizziness or syncope.  He had an echo today showing normal LV systolic function and LVEF approx. 55-60% with moderately dilated LA and mild MR, TR, AI and PI.  He has no new cardiac symptoms otherwise.    22:  Less SOB.  No anginal chest pains or other cardiac changes.  A-V pacing on the monitor.    22:  Slowly improving.  Remains afebrile.  H&H low but no obvious bleeding.  A-v pacing on the monitor.  No new cardiac symptoms.    22:  Feeling good.  A-V pacing on the monitor. No new cardiac symptoms.  Was scheduled for chemoRx at [place] next week but this was cancelled due to his PNA.    22;  Feeling better with less SOB.  A-V pacing on the monitor with occasional AF with controlled VR.  No new cardiac symptoms.  H/H 8.3 twice yesterday.    22:  Continue to feel good.  Hgb up to 8.8 yesterday.  Maintaining A-V pacing on the monitor.  No new cardiac issues so far.  Eager to go home.        PMHx:  PAST MEDICAL & SURGICAL HISTORY:  Atrial fibrillation  Hypertension  CAD (coronary artery disease)-stent in   CABG  Pacemaker  Rheumatic fever-child traylor  Prostate ca-radiation treatment 5yrs ago  Symptomatic bradycardia  Polycythemia vera  Artificial cardiac pacemaker-  History of PTCA-with stent to RCA       FAMILY HISTORY:   FAMILY HISTORY:-  Family history of bronchiectasis (Mother)      ALLERGIES:  Allergies  No Known Allergies      REVIEW OF SYSTEMS:  10 point ROS was obtained  Pertinent positives and negatives are as above  All other review of systems is negative unless indicated above      Vital Signs Last 24 Hrs  T(C): 38.2 (2022 06:41), Max: 38.8 (2022 15:10)  T(F): 100.8 (2022 06:41), Max: 101.8 (2022 15:10)  HR: 73 (2022 00:00) (70 - 78)  BP: 118/59 (2022 00:00) (95/47 - 118/59)  BP(mean): --  RR: 16 (2022 00:00) (16 - 18)  SpO2: 98% (2022 00:00) (91% - 99%)      I&O's Summary    2022 07:01  -  15 Feb 2022 07:00  --------------------------------------------------------  IN: 829 mL / OUT: 1975 mL / NET: -1146 mL    15 Feb 2022 07:01  -  15 Feb 2022 20:06  --------------------------------------------------------  IN: 60.7 mL / OUT: 0 mL / NET: 60.7 mL        PHYSICAL EXAM:   Constitutional: NAD, awake and alert, well-developed  HEENT: PERR, EOMI, Normal Hearing, MMM  Neck: Soft and supple, No LAD, No JVD  Respiratory: Breath sounds are clear bilaterally, No wheezing, rales or rhonchi, PPM in left chest wall  Cardiovascular: S1 and S2, regular rate and regular rhythm, soft ANA M at LLSB and base as before, no gallops or rubs  Gastrointestinal: Bowel Sounds present, soft, nontender, nondistended, no guarding, no rebound  Extremities: No peripheral edema  Vascular: 2+ peripheral pulses  Neurological: A/O x 3, no focal deficits  Musculoskeletal: 5/5 strength b/l upper and lower extremities      MEDICATIONS  (STANDING):  apixaban 5 milliGRAM(s) Oral every 12 hours  atorvastatin 40 milliGRAM(s) Oral at bedtime  budesonide 160 MICROgram(s)/formoterol 4.5 MICROgram(s) Inhaler 2 Puff(s) Inhalation two times a day  digoxin     Tablet 250 MICROGram(s) Oral daily  metoprolol tartrate 25 milliGRAM(s) Oral two times a day  pantoprazole    Tablet 40 milliGRAM(s) Oral two times a day  potassium chloride    Tablet ER 10 milliEquivalent(s) Oral daily  tiotropium 18 MICROgram(s) Capsule 1 Capsule(s) Inhalation daily    MEDICATIONS  (PRN):  acetaminophen     Tablet .. 650 milliGRAM(s) Oral every 6 hours PRN Temp greater or equal to 38.5C (101.3F), Moderate Pain (4 - 6)      LABS: All Labs Reviewed:                        8.8    4.36  )-----------( 300      ( 2022 11:34 )             26.3                           8.3    x     )-----------( x        ( 2022 18:01 )             25.0                           6.8    6.15  )-----------( 148      ( 2022 06:23 )             20.8                           7.9    4.49  )-----------( 96       ( 15 Feb 2022 06:59 )             23.9       02-    136  |  102  |  14  ----------------------------<  132<H>  3.7   |  29  |  0.84    Ca    8.0<L>      2022 11:34      02-20    136  |  103  |  15  ----------------------------<  91  3.2<L>   |  27  |  0.72    Ca    7.9      2022 07:19            139  |  109<H>  |  24<H>  ----------------------------<  143<H>  4.3   |  25  |  1.02    Ca    8.5      2022 06:23      -    135  |  104  |  23  ----------------------------<  167<H>  3.8   |  23  |  0.88    Ca    8.2<L>      2022 06:51    TPro  5.5<L>  /  Alb  1.8<L>  /  TBili  1.0  /  DBili  0.4<H>  /  AST  35  /  ALT  33  /  AlkPhos  57  02-15      02-15    135  |  105  |  17  ----------------------------<  161<H>  3.6   |  22  |  0.73    Ca    8.0<L>      15 Feb 2022 06:59    TPro  5.5<L>  /  Alb  1.8<L>  /  TBili  1.0  /  DBili  0.4<H>  /  AST  35  /  ALT  33  /  AlkPhos  57  02-15    PT/INR - ( 15 Feb 2022 06:59 )   PT: 18.3 sec;   INR: 1.61 ratio       PTT - ( 2022 21:55 )  PTT:33.8 sec    Troponin I, High Sensitivity (22 @ 21:55): 91.23    Serum Pro-Brain Natriuretic Peptide: 4288 pg/mL (02-15 @ 07:08)  Serum Pro-Brain Natriuretic Peptide: 5332 pg/mL ( @ 21:55)    Digoxin Level, Serum (22 @ 06:41): 1.42 ng/mL   Digoxin Level, Serum (02.15.22 @ 06:59): .86 ng/mL     CULTURES:   22 & 22:  Organism --  Gram Stain Blood -- Gram Stain --  Specimen Source .Blood None  Culture-Blood --No growth to date.     22:  Organism --  Gram Stain Urine -- Gram Stain --  Specimen Source Clean Catch None  Culture-Urine --No growth       LIPID PROFILE     RADIOLOGY:    CXR: 22:  Findings/  Impression: Status post CABG. status post pacemaker. No lung   consolidations. Trace right pleural effusion.    CXR: 22:  FINDINGS:  CATHETERS AND TUBES: None  PULMONARY: The visualized lungs are clear of airspace consolidations or effusions. No pneumothorax.  HEART/VASCULAR: The  heart is mildly enlarged in transverse diameter. Status postcoronary artery bypass graft procedure. Cardiac device wire leads are within right atrium and right ventricle. .  BONES: Visualized osseous structures are intact.  IMPRESSION:   No radiographic evidence of active chest disease.      CTA of Chest: 22:  FINDINGS:  LUNGS AND AIRWAYS: Patent central airways.  Right upper lobe postsurgical changes. Mosaic attenuation pattern to the bilateral upper lobes which may be related to edema or air trapping or small airways disease. Patchy opacities in the left lower lobe and to a lesser extent in the right lower lobe which may represent infection. Correlate clinically. Follow these findings to resolution to exclude other processes.  PLEURA: Trace bilateral pleural fluid.  MEDIASTINUM AND KATELYN: No lymphadenopathy.  VESSELS: No pulmonary embolism.  HEART: Cardiomegaly. Coronary artery calcifications. No pericardial effusion.  CHEST WALL AND LOWER NECK: Within normal limits.  VISUALIZED UPPER ABDOMEN: Within normal limits.  BONES: Sternotomy. Degenerative changes.  IMPRESSION:  No pulmonary embolism.  Mosaic attenuation pattern to the bilateral upper lobes which may be related to edema or air trapping or small airways disease. Patchy opacities in the left lower lobe and to a lesser extent in the right lower lobe which may represent infection. Correlate clinically. Follow these findings to resolution to exclude other processes.      EK22:  Wide QRS tachycardia  Right bundle branch block      TELEMETRY:  A-V pacing with underlying AF    ECHO:  2/15/22:  M-Mode Measurements (cm):   LVEDd: 5.66 cm            LVESd: 4.04 cm   IVSEd: 0.85 cm   LVPWd: 0.97 cm            AO Root Dimension: 3.8 cm                       ACS: 2.1 cm                             LA: 5.3 cm                             LVOT: 2.2 cm  Doppler Measurements:   AV Velocity:195 cm/s                MV Peak E-Wave: 68.6 cm/s   AV Peak Gradient: 15.21 mmHg        MV Peak A-Wave: 52.3 cm/s                                       MV E/A Ratio: 1.31 %   TR Velocity:305 cm/s                MV Peak Gradient: 1.88 mmHg   TR Gradient:37.21 mmHg   Estimated RAP:5 mmHg   RVSP:42 mmHg    Findings:  Mitral Valve:   Fibrocalcific changes noted to the mitral valve leaflets with preserved leaflet excursion.   Mild mitral regurgitation is present.    Aortic Valve:   Fibrocalcific changes noted to the Aortic valve leaflets with mildly reduced mobility but does not meet criteria for aortic stenosis.   Trace to mild aortic regurgitation is present.    Tricuspid Valve:   Normal appearing tricuspid valve structure.   Mild (1+) tricuspid valve regurgitation is present.   Mild pulmonary hypertension.    Pulmonic Valve:   Normal appearing pulmonic valve structure and function.    Left Atrium:   The left atrium is moderately dilated.    Left Ventricle:   The left ventricle is normal in size, wall thickness, wall motion and contractility.   Estimated left ventricular ejection fraction is 55-60 %.    Right Atrium:   Normal appearing right atrium.    Right Ventricle:   Normal appearing right ventricle structure and function.   A device wire is seen in the RV and RA.    Pericardial Effusion:   No evidence of pericardial effusion.    Miscellaneous:   IVC was not visualized.   No subcostal window seen.    Summary:   The left ventricle is normal in size, wall thickness, wall motion and contractility.   Estimated left ventricular ejection fraction is 55-60 %.   The left atrium is moderately dilated.   Normal appearing right atrium.   Normal appearing right ventricle structure and function.   A device wire is seen in the RV and RA.   Fibrocalcific changes noted to the Aortic valve leaflets with mildly reduced mobility but does not meet criteria for aortic stenosis.   Trace to mild aortic regurgitation is present.   Normal appearing tricuspid valve structure.   Mild (1+) tricuspid valve regurgitation is present.   Mild pulmonary hypertension.   No evidence of pericardial effusion.    Signature:   ----------------------------------------------------------------   Electronically signed by Kory Ibarra MD(Interpreting physician)   on 02/15/2022 06:19 PM   ----------------------------------------------------------------

## 2022-02-22 NOTE — PROGRESS NOTE ADULT - ASSESSMENT
2/15/22: Pt with above history and lung CA on chemoRx with SOB but likely not of cardiac origin.  Being treated for PNA in the face of underlying lung CA but clinically appears better.  His AF is rate controlled with an underlying PPM for bradycardia protection.  I would continue his current meds including the Digoxin as it does not appear to be near toxic range.  Continue as outlined by medicine and ID etal.  Will follow as work-up and treatment progresses.    2/16/22:  Less SOB.  No anginal chest pains or other cardiac changes.  A-V pacing on the monitor.  Continue as outlined by medicine and ID etal.  Will follow as work-up and treatment progresses.    2/17/22:  Slowly improving.  Remains afebrile.  H&H low but no obvious bleeding.  A-v pacing on the monitor.  No new cardiac symptoms.  Continue as outlined by medicine and ID etal.  Will continue to follow as treatment progresses.    2/18/22:  Feeling good.  A-V pacing on the monitor. No new cardiac symptoms.  Was scheduled for chemoRx at [place] next week but this was cancelled due to his PNA.  Continue as outlined by medicine and ID etal.  I will be away this weekend.  Dr Quintero will be covering me if needed.    2/21/22;  Feeling better with less SOB.  A-V pacing on the monitor with occasional AF with controlled VR.  No new cardiac symptoms.  H/H 8.3 twice yesterday.  Continue as outlined by medicine and ID etal.  Will continue to follow intermittently prn.    2/22/22:  Continue to feel good.  Hgb up to 8.8 yesterday.  Maintaining A-V pacing on the monitor.  No new cardiac issues so far.  Eager to go home.  Continue as outlined by medicine,oncology and ID etal.  Will continue to follow intermittently prn.  Follow up at [facility] an needed.

## 2022-02-23 ENCOUNTER — TRANSCRIPTION ENCOUNTER (OUTPATIENT)
Age: 87
End: 2022-02-23

## 2022-02-23 LAB
ANION GAP SERPL CALC-SCNC: 7 MMOL/L — SIGNIFICANT CHANGE UP (ref 5–17)
APPEARANCE UR: CLEAR — SIGNIFICANT CHANGE UP
BILIRUB UR-MCNC: NEGATIVE — SIGNIFICANT CHANGE UP
BUN SERPL-MCNC: 10 MG/DL — SIGNIFICANT CHANGE UP (ref 7–23)
CALCIUM SERPL-MCNC: 8.2 MG/DL — LOW (ref 8.5–10.1)
CHLORIDE SERPL-SCNC: 101 MMOL/L — SIGNIFICANT CHANGE UP (ref 96–108)
CO2 SERPL-SCNC: 28 MMOL/L — SIGNIFICANT CHANGE UP (ref 22–31)
COLOR SPEC: YELLOW — SIGNIFICANT CHANGE UP
CREAT SERPL-MCNC: 0.81 MG/DL — SIGNIFICANT CHANGE UP (ref 0.5–1.3)
DIFF PNL FLD: NEGATIVE — SIGNIFICANT CHANGE UP
GLUCOSE SERPL-MCNC: 93 MG/DL — SIGNIFICANT CHANGE UP (ref 70–99)
GLUCOSE UR QL: NEGATIVE — SIGNIFICANT CHANGE UP
HCT VFR BLD CALC: 28 % — LOW (ref 39–50)
HGB BLD-MCNC: 9 G/DL — LOW (ref 13–17)
KETONES UR-MCNC: NEGATIVE — SIGNIFICANT CHANGE UP
LEUKOCYTE ESTERASE UR-ACNC: ABNORMAL
MAGNESIUM SERPL-MCNC: 2.1 MG/DL — SIGNIFICANT CHANGE UP (ref 1.6–2.6)
MCHC RBC-ENTMCNC: 31.8 PG — SIGNIFICANT CHANGE UP (ref 27–34)
MCHC RBC-ENTMCNC: 32.1 GM/DL — SIGNIFICANT CHANGE UP (ref 32–36)
MCV RBC AUTO: 98.9 FL — SIGNIFICANT CHANGE UP (ref 80–100)
NITRITE UR-MCNC: NEGATIVE — SIGNIFICANT CHANGE UP
PH UR: 8 — SIGNIFICANT CHANGE UP (ref 5–8)
PHOSPHATE SERPL-MCNC: 2.3 MG/DL — LOW (ref 2.5–4.5)
PLATELET # BLD AUTO: 373 K/UL — SIGNIFICANT CHANGE UP (ref 150–400)
POTASSIUM SERPL-MCNC: 3.9 MMOL/L — SIGNIFICANT CHANGE UP (ref 3.5–5.3)
POTASSIUM SERPL-SCNC: 3.9 MMOL/L — SIGNIFICANT CHANGE UP (ref 3.5–5.3)
PROT UR-MCNC: 30 MG/DL
RBC # BLD: 2.83 M/UL — LOW (ref 4.2–5.8)
RBC # FLD: 19.9 % — HIGH (ref 10.3–14.5)
SARS-COV-2 RNA SPEC QL NAA+PROBE: SIGNIFICANT CHANGE UP
SODIUM SERPL-SCNC: 136 MMOL/L — SIGNIFICANT CHANGE UP (ref 135–145)
SP GR SPEC: 1.01 — SIGNIFICANT CHANGE UP (ref 1.01–1.02)
UROBILINOGEN FLD QL: 4
WBC # BLD: 5.75 K/UL — SIGNIFICANT CHANGE UP (ref 3.8–10.5)
WBC # FLD AUTO: 5.75 K/UL — SIGNIFICANT CHANGE UP (ref 3.8–10.5)

## 2022-02-23 PROCEDURE — 99233 SBSQ HOSP IP/OBS HIGH 50: CPT

## 2022-02-23 RX ORDER — AZITHROMYCIN 500 MG/1
500 TABLET, FILM COATED ORAL EVERY 24 HOURS
Refills: 0 | Status: DISCONTINUED | OUTPATIENT
Start: 2022-02-23 | End: 2022-02-23

## 2022-02-23 RX ADMIN — PANTOPRAZOLE SODIUM 40 MILLIGRAM(S): 20 TABLET, DELAYED RELEASE ORAL at 21:39

## 2022-02-23 RX ADMIN — Medication 25 MILLIGRAM(S): at 09:20

## 2022-02-23 RX ADMIN — Medication 100 MILLIGRAM(S): at 21:40

## 2022-02-23 RX ADMIN — APIXABAN 5 MILLIGRAM(S): 2.5 TABLET, FILM COATED ORAL at 09:21

## 2022-02-23 RX ADMIN — TIOTROPIUM BROMIDE 1 CAPSULE(S): 18 CAPSULE ORAL; RESPIRATORY (INHALATION) at 08:28

## 2022-02-23 RX ADMIN — BUDESONIDE AND FORMOTEROL FUMARATE DIHYDRATE 2 PUFF(S): 160; 4.5 AEROSOL RESPIRATORY (INHALATION) at 08:28

## 2022-02-23 RX ADMIN — Medication 25 MILLIGRAM(S): at 21:39

## 2022-02-23 RX ADMIN — Medication 250 MICROGRAM(S): at 09:20

## 2022-02-23 RX ADMIN — APIXABAN 5 MILLIGRAM(S): 2.5 TABLET, FILM COATED ORAL at 21:39

## 2022-02-23 RX ADMIN — ATORVASTATIN CALCIUM 40 MILLIGRAM(S): 80 TABLET, FILM COATED ORAL at 21:39

## 2022-02-23 RX ADMIN — Medication 10 MILLIEQUIVALENT(S): at 13:56

## 2022-02-23 RX ADMIN — PANTOPRAZOLE SODIUM 40 MILLIGRAM(S): 20 TABLET, DELAYED RELEASE ORAL at 09:20

## 2022-02-23 RX ADMIN — Medication 40 MILLIGRAM(S): at 09:20

## 2022-02-23 NOTE — DISCHARGE NOTE PROVIDER - PROVIDER TOKENS
PROVIDER:[TOKEN:[[id number]],ESTABLISHEDPATIENT:[T]],PROVIDER:[[id number]],ESTABLISHEDPATIENT:[T]],PROVIDER:[TOKEN:[[id number]],ESTABLISHEDPATIENT:[T]],PROVIDER:[TOKEN:[[id number]],ESTABLISHEDPATIENT:[T]] PROVIDER:[TOKEN:[[id number]],ESTABLISHEDPATIENT:[T]],PROVIDER:[[id number]],ESTABLISHEDPATIENT:[T]],PROVIDER:[TOKEN:[[id number]],ESTABLISHEDPATIENT:[T]],PROVIDER:[TOKEN:[[id number]],ESTABLISHEDPATIENT:[T]],PROVIDER:[TOKEN:[[id number]]]

## 2022-02-23 NOTE — PROGRESS NOTE ADULT - ASSESSMENT
2/15/22: Pt with above history and lung CA on chemoRx with SOB but likely not of cardiac origin.  Being treated for PNA in the face of underlying lung CA but clinically appears better.  His AF is rate controlled with an underlying PPM for bradycardia protection.  I would continue his current meds including the Digoxin as it does not appear to be near toxic range.  Continue as outlined by medicine and ID etal.  Will follow as work-up and treatment progresses.    2/16/22:  Less SOB.  No anginal chest pains or other cardiac changes.  A-V pacing on the monitor.  Continue as outlined by medicine and ID etal.  Will follow as work-up and treatment progresses.    2/17/22:  Slowly improving.  Remains afebrile.  H&H low but no obvious bleeding.  A-v pacing on the monitor.  No new cardiac symptoms.  Continue as outlined by medicine and ID etal.  Will continue to follow as treatment progresses.    2/18/22:  Feeling good.  A-V pacing on the monitor. No new cardiac symptoms.  Was scheduled for chemoRx at [facility] next week but this was cancelled due to his PNA.  Continue as outlined by medicine and ID etal.  I will be away this weekend.  Dr Quintero will be covering me if needed.    2/21/22;  Feeling better with less SOB.  A-V pacing on the monitor with occasional AF with controlled VR.  No new cardiac symptoms.  H/H 8.3 twice yesterday.  Continue as outlined by medicine and ID etal.  Will continue to follow intermittently prn.    2/22/22:  Continue to feel good.  Hgb up to 8.8 yesterday.  Maintaining A-V pacing on the monitor.  No new cardiac issues so far.  Eager to go home.  Continue as outlined by medicine, oncology and ID etal.  Will continue to follow intermittently prn.  Follow up at [facility] an needed.    2/23/22:  Still with low grade temps to 100.8 & 100.1 but now 99.6.  Maintaining A-V pacing on the monitor.  Hgb=9.1 yesterday and continues to improve. CT of Chest as below.  No new cardiac issues so far.  Continue as outlined by medicine, pulmonary, oncology and ID etal.  Will continue to follow intermittently prn.

## 2022-02-23 NOTE — PROGRESS NOTE ADULT - ASSESSMENT
1) Dyspnea  2) Adenocarcinoma (unclear full staging)  3) Abnormal CT Chest  4) COPD    85 y/o male with a PMHx of polycythemia vera, prostate CA, rheumatic fever, CAD s/p CABG,  Afib s/p PPM , HTN, ? Prostate CA  presents to the ED c/o generalized weakness, and  SOB. The pt has been feeling extreme weakness and SOB associated with fever for the past four days; hence, his wife advised him to visit [facility] for further evaluation.  Received Keytruda in the past for lung cancer  Per chart review from [facility], patient was seen by Dr Chow 12/2021  History of lung cancer, s/p right upper wedge resection 8/2016 with Dr Solorio.  Noted to have Adenocarcinoma that was 1.2cm  [place] [facility] [place].  Developed rash after chemotherapy and it was stopped.  History of PAF as well, sees Dr Jett, treated with amiodarone as well.  PFT revealed mild obstructive ventilatory defect. no air trapping hyperinflation, DLCO moderately reduced.  CT Chest at [facility] 1/2022 revealed that in the left lung there is a groundglass density with a centrally dilated bronchus in the LLL  measuring 1.8cm in greatest dimension and in the medial aspect of the left lower lobe.   In the medial aspect of the RUL there is a spiculated nodular density with the central areas of aeration that measures 2.6cm by 1.3cm (neoplastic finding cannot be excluded)  Thereafter underwent a new CT Chest this admission that revealed Patent central airways.  Right upper lobe postsurgical changes. Mosaic attenuation pattern to the bilateral upper lobes which may be related to edema or air trapping or small airways disease. Patchy opacities in the left lower lobe and to a lesser extent in the right lower lobe which may represent infection.   CT Chest revealed Patient evaluated this morning, still noted to have febrile episodes overnight   CT Chest performed revealed Increased lung findings since 2/13/2022, favored to represent pulmonary edema.  Increased small pleural effusions. Stable subcentimeter nodules in the right lower lobe.   In terms of etiology of the mosaic attenuation/air trapping/vascular disease on CT Chest, the thought process is that it is likely small airway disease   Appreciate ID recommendations  Patient states that the Symbicort 2 puffs BID and Spiriva have been helping  May need CT Abdomen if he continues to have febrile episodes. It is also possible for fevers to occur two months after the last dose of immunotherapy so if the fevers do in fact persist, will consider starting Steroids   Will discuss further with hospitalist service. Discussed with ID

## 2022-02-23 NOTE — PROGRESS NOTE ADULT - SUBJECTIVE AND OBJECTIVE BOX
JESSICA HOWARD  86y  [id number]    CHIEF COMPLAINT: weakness, SOB    INTERVAL HISTORY: Patient AOx3, denies any complaints this morning. Patient denies CP, SOB, HA, dizziness, N/V/D, urinary complaints. Patient reports he "feels even better than yesterday." No acute events overnight.     ROS: All other systems reviewed and found to be negative with the exception of what has been described above.    VITALS: T(C): 36.4 (02-23-22 @ 08:06), Max: 37.8 (02-22-22 @ 23:00)  HR: 70 (02-23-22 @ 08:31) (70 - 80)  BP: 106/51 (02-23-22 @ 08:06) (93/47 - 106/51)  RR: 18 (02-23-22 @ 08:06) (17 - 18)  SpO2: 95% (02-23-22 @ 08:06) (94% - 95%)  PHYSICAL EXAM: VITALS:  T(F): 97.6 (02-23-22 @ 08:06), Max: 100.1 (02-22-22 @ 23:00)  HR: 70 (02-23-22 @ 08:31) (70 - 80)  BP: 106/51 (02-23-22 @ 08:06) (93/47 - 106/51)  RR: 18 (02-23-22 @ 08:06) (17 - 18)  SpO2: 95% (02-23-22 @ 08:06) (94% - 95%)  Wt(kg): --    I&O's Summary    22 Feb 2022 07:01  -  23 Feb 2022 07:00  --------------------------------------------------------  IN: 0 mL / OUT: 300 mL / NET: -300 mL        CAPILLARY BLOOD GLUCOSE          PHYSICAL EXAM:  GENERAL: well appearing 87yo male   HEENT:  pupils equal and reactive, EOMI, no oropharyngeal lesions, erythema, exudates, oral thrush  NECK:   supple, no carotid bruits, no palpable lymph nodes, no thyromegaly  CV:  +S1, +S2, regular, no murmurs or rubs, left chest wall PPM  RESP:   RLL + crackles, left lung clear all fields, good aeration breathing non-labored  BREAST:  not examined  GI:  abdomen soft, non-tender, non-distended, normal BS, no bruits, no abdominal masses, no palpable masses  RECTAL:  not examined  :  not examined  MSK:   normal muscle tone, no atrophy, no rigidity, no contractions  EXT:  no clubbing, no cyanosis, no edema, no calf pain, swelling or erythema  VASCULAR:  pulses equal and symmetric in the upper and lower extremities  NEURO:  AAOX3, no focal neurological deficits, follows all commands, able to move extremities spontaneously  SKIN:  no ulcers, lesions or rashes, skin tear to left elbow with xeroform in place    LABS:                            9.0    5.75  )-----------( 373      ( 23 Feb 2022 07:08 )             28.0     02-23    136  |  101  |  10  ----------------------------<  93  3.9   |  28  |  0.81    Ca    8.2<L>      23 Feb 2022 07:08  Phos  2.3     02-23  Mg     2.1     02-23                                              MICRO:      IMAGING:    CARDIAC TESTING:    PROCEDURES:    MEDS: MEDICATIONS  (STANDING):  apixaban 5 milliGRAM(s) Oral every 12 hours  atorvastatin 40 milliGRAM(s) Oral at bedtime  azithromycin  IVPB 500 milliGRAM(s) IV Intermittent every 24 hours  budesonide 160 MICROgram(s)/formoterol 4.5 MICROgram(s) Inhaler 2 Puff(s) Inhalation two times a day  digoxin     Tablet 250 MICROGram(s) Oral daily  furosemide    Tablet 40 milliGRAM(s) Oral daily  metoprolol tartrate 25 milliGRAM(s) Oral two times a day  pantoprazole    Tablet 40 milliGRAM(s) Oral two times a day  potassium chloride    Tablet ER 10 milliEquivalent(s) Oral daily  tiotropium 18 MICROgram(s) Capsule 1 Capsule(s) Inhalation daily    MEDICATIONS  (PRN):  acetaminophen     Tablet .. 650 milliGRAM(s) Oral every 6 hours PRN Temp greater or equal to 38.5C (101.3F), Moderate Pain (4 - 6)          Code status: DNR/DNI  DVT px: Eliquis / SCD  Dispo:

## 2022-02-23 NOTE — PROGRESS NOTE ADULT - ASSESSMENT
This is an 86 year old male with history of NSCLC ADENOCARINOMA on Pemetrexed MONOTHERAPY now admitted for PNA with recurrent fevers after course of antibiotics    NSCLC   - now on active treatment with Dr. Frausto   - patient on PEMETREXED   - PATIENT HAS NOT RECEIVED IMMUNOTHERAPY SINCE beginning of JANUARY, Doubt I/O as source of recurrent fevers  - patient likely cytopenic from recent chemotherapy administration - w/ pemetrexed    Sepsis secondary to PNA   - CEFEPIME has been discontinued- pt still spiking fevers yesterday am- none since 8 am on 2/22  - possible fever secondary to underlying disease yet would consider atypical organisms perhaps allowing cultures to continue to grow - given azithromycin   - ID currently following - discussed with ID - to reconsult today  - CTA No pulmonary embolism. Patchy opacities in the left lower lobe and to a lesser extent in the right lower lobe which may represent infection.   - re-cultured 2/21 ntd- ordered atypical coverage with azithromycin  - ID to see again today- discussed with Dr. Verdugo  - CXR w/ right upper lobe infiltrate  - CT chest- Increased largely central ground-glass and interlobular septal thickening in all lobes of the right lung and left upper lobe. Stable few subcentimeter nodules in the right lower lobe. Increased small pleural effusions.  - lasix given    ANEMIA   - possibly secondary to antineoplastic therapy vs GIB- pt has not received pRBC transfusion during therapy before  - seen by GI no plan for endoscopy FOBT negative- c/w protonix  - Hb dropped yesterday from 10.1->8.3, was 8.3 --> Hb 8.8->9.1->9 today- continue to trend as pt back on eliquis    Dr. Chad Cristina  cell: [phone]  Weekends and nights please call [phone] for MD on call  [facility]  Hematology/Oncology

## 2022-02-23 NOTE — PROGRESS NOTE ADULT - SUBJECTIVE AND OBJECTIVE BOX
Date of service: 02-23-22 @ 13:26      Patient seen sitting in chair; has been having intermittent fevers, even when on cefepime, has no specific complaints, however the wife at bedside notes he has no appetite except for desert and that his mentation appears a little cloudy      ROS: no  chills; denies dizziness, no HA, no SOB or cough, no abdominal pain, no diarrhea or constipation; no dysuria, no urinary frequency, no legs pain, no rashes    MEDICATIONS  (STANDING):  apixaban 5 milliGRAM(s) Oral every 12 hours  atorvastatin 40 milliGRAM(s) Oral at bedtime  budesonide 160 MICROgram(s)/formoterol 4.5 MICROgram(s) Inhaler 2 Puff(s) Inhalation two times a day  digoxin     Tablet 250 MICROGram(s) Oral daily  doxycycline hyclate Capsule 100 milliGRAM(s) Oral every 12 hours  furosemide    Tablet 40 milliGRAM(s) Oral daily  metoprolol tartrate 25 milliGRAM(s) Oral two times a day  pantoprazole    Tablet 40 milliGRAM(s) Oral two times a day  potassium chloride    Tablet ER 10 milliEquivalent(s) Oral daily  tiotropium 18 MICROgram(s) Capsule 1 Capsule(s) Inhalation daily    MEDICATIONS  (PRN):  acetaminophen     Tablet .. 650 milliGRAM(s) Oral every 6 hours PRN Temp greater or equal to 38.5C (101.3F), Moderate Pain (4 - 6)      Vital Signs Last 24 Hrs  T(C): 36.4 (23 Feb 2022 08:06), Max: 37.8 (22 Feb 2022 23:00)  T(F): 97.6 (23 Feb 2022 08:06), Max: 100.1 (22 Feb 2022 23:00)  HR: 70 (23 Feb 2022 08:31) (70 - 80)  BP: 106/51 (23 Feb 2022 08:06) (93/47 - 106/51)  BP(mean): 55 (22 Feb 2022 20:12) (55 - 55)  RR: 18 (23 Feb 2022 08:06) (17 - 18)  SpO2: 95% (23 Feb 2022 08:06) (94% - 95%)        Physical Exam:          Constitutional: frail looking  HEENT: NC/AT, EOMI, PERRLA, conjunctivae clear; ears and nose atraumatic; pharynx clear  Neck: supple; thyroid not palpable  Back: no tenderness  Respiratory: respiratory effort normal; clear to auscultation bilaterally  Cardiovascular: S1S2 regular, no murmurs  Abdomen: soft, not tender, not distended, positive BS; no liver or spleen organomegaly  Genitourinary: no suprapubic tenderness  Musculoskeletal: no muscle tenderness, no joint swelling or tenderness  Neurological/ Psychiatric:  moving all extremities  Skin: no rashes; no palpable lesions; multiple ecchymosis    Labs: all available labs reviewed                         Labs:           Labs:                     Labs:                        9.0    5.75  )-----------( 373      ( 23 Feb 2022 07:08 )             28.0     02-23    136  |  101  |  10  ----------------------------<  93  3.9   |  28  |  0.81    Ca    8.2<L>      23 Feb 2022 07:08  Phos  2.3     02-23  Mg     2.1     02-23             Cultures:       Culture - Blood (collected 02-21-22 @ 18:43)  Source: .Blood None  Preliminary Report (02-22-22 @ 23:01):    No growth to date.    Culture - Blood (collected 02-21-22 @ 18:43)  Source: .Blood None  Preliminary Report (02-22-22 @ 23:01):    No growth to date.    Culture - Urine (collected 02-19-22 @ 08:14)  Source: Clean Catch Clean Catch (Midstream)  Final Report (02-20-22 @ 14:19):    No growth    Culture - Blood (collected 02-19-22 @ 06:41)  Source: .Blood None  Preliminary Report (02-20-22 @ 09:01):    No growth to date.    Culture - Blood (collected 02-19-22 @ 06:41)  Source: .Blood None  Preliminary Report (02-20-22 @ 09:01):    No growth to date.      Ferritin, Serum: 1202 ng/mL (02-21-22 @ 11:34)        < from: Xray Chest 1 View-PORTABLE IMMEDIATE (Xray Chest 1 View-PORTABLE IMMEDIATE .) (02.19.22 @ 06:33) >  ACC: [id number] EXAM:  XR CHEST PORTABLE IMMED 1V                          PROCEDURE DATE:  02/19/2022          INTERPRETATION:  Clinical Information: Dyspnea    Technique: AP chest x-ray(s).    Comparison: None    Findings/  Impression: Status post CABG. status post pacemaker. No lung   consolidations. Trace right pleural effusion.    < end of copied text >                < from: CT Angio Chest PE Protocol w/ IV Cont (02.13.22 @ 23:16) >    ACC: [id number] EXAM:  CT ANGIO CHEST PULM ART [facility]                          PROCEDURE DATE:  02/13/2022          INTERPRETATION:  CLINICAL INFORMATION: Lung cancer, hypoxia, evaluate for   pulmonary embolism    COMPARISON: Chest x-ray 2/13/2022. CTabdomen pelvis 9/12/2014    CONTRAST/COMPLICATIONS:  IV Contrast: Omnipaque 350  90 cc administered   10 cc discarded  Oral Contrast: NONE  Complications: None reported at time of study completion    PROCEDURE:  CT Angiography of the Chest.  [place] coronal reformats were performed as well as 3D (MIP)   reconstructions.    FINDINGS:    LUNGS AND AIRWAYS: Patent central airways.  Right upper lobe postsurgical   changes. Mosaic attenuation pattern to the bilateral upper lobes which   may be related to edema or air trapping or small airways disease. Patchy   opacities in the left lower lobe and to a lesser extent in the right   lower lobe which may represent infection. Correlate clinically. Follow   these findings to resolution to exclude other processes.  PLEURA: Trace bilateral pleural fluid.  MEDIASTINUM AND KATELYN: No lymphadenopathy.  VESSELS: No pulmonary embolism.  HEART: Cardiomegaly. Coronary artery calcifications. No pericardial   effusion.  CHEST WALL AND LOWER NECK: Within normal limits.  VISUALIZED UPPER ABDOMEN: Within normal limits.  BONES: Sternotomy. Degenerative changes.    IMPRESSION:    No pulmonary embolism.    Mosaic attenuation pattern to the bilateral upper lobes which may be   related to edema or air trapping or small airways disease. Patchy   opacities in the left lower lobe and to a lesser extent in the right   lower lobe which may represent infection. Correlate clinically. Follow   these findings to resolution to exclude other processes.    < end of copied text >        Radiology: all available radiological tests reviewed    Advanced directives addressed: full resuscitation

## 2022-02-23 NOTE — DISCHARGE NOTE PROVIDER - NSDCMRMEDTOKEN_GEN_ALL_CORE_FT
atorvastatin 40 mg oral tablet: 1 tab(s) orally once a day  Augmentin 500 mg-125 mg oral tablet: 1 tab(s) orally 3 times a day  ***Unable to confirm if course completed***  Breo Ellipta 100 mcg-25 mcg/inh inhalation powder: 1 puff(s) inhaled once a day  ***unable to verify if pt uses inhaler***  Eliquis 5 mg oral tablet: 1 tab(s) orally 2 times a day  ferrous sulfate 324 mg (65 mg elemental iron) oral tablet: 1 tab(s) orally once a day  folic acid 1 mg oral tablet: 1 tab(s) orally once a day  furosemide 40 mg oral tablet: 1 tab(s) orally once a day  Metoprolol Tartrate 50 mg oral tablet: 1 tab(s) orally 2 times a day  multivitamin: 1 tab(s) orally once a day  Potassium Chloride (Eqv-Klor-Con M20) 20 mEq oral tablet, extended release: 1 tab(s) orally once a day  predniSONE 10 mg oral tablet: Taper as directed  ***unable to confirm if completed***  ramipril 5 mg oral capsule: 1 cap(s) orally once a day  Vitamin D3 25 mcg (1000 intl units) oral tablet: 1 tab(s) orally once a day    atorvastatin 40 mg oral tablet: 1 tab(s) orally once a day  Augmentin 500 mg-125 mg oral tablet: 1 tab(s) orally 3 times a day  ***Unable to confirm if course completed***  Breo Ellipta 100 mcg-25 mcg/inh inhalation powder: 1 puff(s) inhaled once a day  ***unable to verify if pt uses inhaler***  Eliquis 5 mg oral tablet: 1 tab(s) orally 2 times a day  ferrous sulfate 324 mg (65 mg elemental iron) oral tablet: 1 tab(s) orally once a day  folic acid 1 mg oral tablet: 1 tab(s) orally once a day  furosemide 40 mg oral tablet: 1 tab(s) orally once a day  Metoprolol Tartrate 50 mg oral tablet: 1 tab(s) orally 2 times a day  multivitamin: 1 tab(s) orally once a day  Potassium Chloride (Eqv-Klor-Con M20) 20 mEq oral tablet, extended release: 1 tab(s) orally once a day  predniSONE 10 mg oral tablet: Taper as directed  ***unable to confirm if completed***  Vitamin D3 25 mcg (1000 intl units) oral tablet: 1 tab(s) orally once a day    atorvastatin 40 mg oral tablet: 1 tab(s) orally once a day  Breo Ellipta 100 mcg-25 mcg/inh inhalation powder: 1 puff(s) inhaled once a day  ***unable to verify if pt uses inhaler***  Eliquis 5 mg oral tablet: 1 tab(s) orally 2 times a day  ferrous sulfate 324 mg (65 mg elemental iron) oral tablet: 1 tab(s) orally once a day  folic acid 1 mg oral tablet: 1 tab(s) orally once a day  furosemide 40 mg oral tablet: 1 tab(s) orally once a day  Metoprolol Tartrate 50 mg oral tablet: 1 tab(s) orally 2 times a day  multivitamin: 1 tab(s) orally once a day  Potassium Chloride (Eqv-Klor-Con M20) 20 mEq oral tablet, extended release: 1 tab(s) orally once a day  predniSONE 10 mg oral tablet: Taper as directed  ***unable to confirm if completed***  tiotropium 18 mcg inhalation capsule: 1 cap(s) inhaled once a day  Vitamin D3 25 mcg (1000 intl units) oral tablet: 1 tab(s) orally once a day    atorvastatin 40 mg oral tablet: 1 tab(s) orally once a day  Breo Ellipta 100 mcg-25 mcg/inh inhalation powder: 1 puff(s) inhaled once a day  ***unable to verify if pt uses inhaler***  digoxin 250 mcg (0.25 mg) oral tablet: 1 tab(s) orally once a day  doxycycline monohydrate 100 mg oral capsule: 1 cap(s) orally every 12 hours  Eliquis 5 mg oral tablet: 1 tab(s) orally 2 times a day  ferrous sulfate 324 mg (65 mg elemental iron) oral tablet: 1 tab(s) orally once a day  folic acid 1 mg oral tablet: 1 tab(s) orally once a day  furosemide 40 mg oral tablet: 1 tab(s) orally once a day  Metoprolol Tartrate 50 mg oral tablet: 1 tab(s) orally 2 times a day  multivitamin: 1 tab(s) orally once a day  Potassium Chloride (Eqv-Klor-Con M20) 20 mEq oral tablet, extended release: 1 tab(s) orally once a day  tiotropium 18 mcg inhalation capsule: 1 cap(s) inhaled once a day  Vitamin D3 25 mcg (1000 intl units) oral tablet: 1 tab(s) orally once a day

## 2022-02-23 NOTE — PROGRESS NOTE ADULT - SUBJECTIVE AND OBJECTIVE BOX
Patient is a 86y old  Male who presents with a chief complaint of acute hypoxic respiratory failure  PNA (19 Feb 2022 13:52)      HPI:  85 y/o male with a PMHx of polycythemia vera, prostate CA, rheumatic fever, CAD s/p CABG,  Afib s/p PPM , HTN, ? Prostate CA  presents to the ED c/o generalized weakness, and  SOB. The pt has been feeling extreme weakness and SOB associated with fever for the past four days; hence, his wife advised him to visit [facility] for further evaluation.  Received Keytruda in the past for lung cancer  Per chart review from [facility], patient was seen by Dr Chow 12/2021  History of lung cancer, s/p right upper wedge resection 8/2016 with Dr Solorio.  Noted to have Adenocarcinoma that was 1.2cm  Treated at [facility] with chemotherapy and immunotherapy.  Developed rash after chemotherapy and it was stopped.  History of PAF as well, sees Dr Jett, treated with amiodarone as well.  PFT revealed mild obstructive ventilatory defect. no air trapping hyperinflation, DLCO moderately reduced.  CT Chest at [facility] 1/2022 revealed that in the left lung there is a groundglass density with a centrally dilated bronchus in the LLL  measuring 1.8cm in greatest dimension and in the medial aspect of the left lower lobe.   In the medial aspect of the RUL there is a spiculated nodular density with the central areas of aeration that measures 2.6cm by 1.3cm (neoplastic finding cannot be excluded)  Thereafter underwent a new CT Chest this admission that revealed Patent central airways.  Right upper lobe postsurgical   changes. Mosaic attenuation pattern to the bilateral upper lobes which may be related to edema or air trapping or small airways disease. Patchy   opacities in the left lower lobe and to a lesser extent in the right lower lobe which may represent infection.   Noted to be febrile overnight    2/23  Patient evaluated this morning, still noted to have febrile episodes overnight but Tm is reducing  CT Chest performed revealed Increased lung findings since 2/13/2022, favored to represent pulmonary edema.  Increased small pleural effusions. Stable subcentimeter nodules in the right lower lobe.     PAST MEDICAL & SURGICAL HISTORY:  Atrial fibrillation    Hypertension    CAD (coronary artery disease)  stent in 2007    Pacemaker    Rheumatic fever  child traylor    Prostate ca  radiation treatment 5yrs ago    Symptomatic bradycardia    Polycythemia vera    Artificial cardiac pacemaker  2006    History of PTCA  with stent to RCA 2007        MEDICATIONS  (STANDING):  apixaban 5 milliGRAM(s) Oral every 12 hours  atorvastatin 40 milliGRAM(s) Oral at bedtime  cefepime  Injectable. 1000 milliGRAM(s) IV Push every 8 hours  digoxin     Tablet 250 MICROGram(s) Oral daily  metoprolol tartrate 25 milliGRAM(s) Oral two times a day  pantoprazole    Tablet 40 milliGRAM(s) Oral two times a day  potassium chloride    Tablet ER 10 milliEquivalent(s) Oral daily    MEDICATIONS  (PRN):  acetaminophen     Tablet .. 650 milliGRAM(s) Oral every 6 hours PRN Temp greater or equal to 38.5C (101.3F), Moderate Pain (4 - 6)        FAMILY HISTORY:  Family history of bronchiectasis (Mother)        SOCIAL HISTORY:  ***    REVIEW OF SYSTEM:  dyspnea, fatigue    Vital Signs Last 24 Hrs  T(C): 36.4 (23 Feb 2022 08:06), Max: 37.8 (22 Feb 2022 23:00)  T(F): 97.6 (23 Feb 2022 08:06), Max: 100.1 (22 Feb 2022 23:00)  HR: 70 (23 Feb 2022 08:31) (70 - 80)  BP: 106/51 (23 Feb 2022 08:06) (93/47 - 106/51)  BP(mean): 55 (22 Feb 2022 20:12) (55 - 55)  RR: 18 (23 Feb 2022 08:06) (17 - 18)  SpO2: 95% (23 Feb 2022 08:06) (94% - 95%)    I&O's Summary    19 Feb 2022 07:01  -  20 Feb 2022 07:00  --------------------------------------------------------  IN: 0 mL / OUT: 2400 mL / NET: -2400 mL      PHYSICAL EXAM  General Appearance: cooperative, no acute distress,   HEENT: PERRL, conjunctiva clear, EOM's intact, non injected pharynx, no exudate, TM   normal  Neck: Supple, , no adenopathy, thyroid: not enlarged, no carotid bruit or JVD  Back: Symmetric, no  tenderness,no soft tissue tenderness  Lungs: coarse bilaterally   Heart: Regular rate and rhythm, S1, S2 normal, no murmur, rub or gallop  Abdomen: Soft, non-tender, bowel sounds active , no hepatosplenomegaly  Extremities: no cyanosis or edema, no joint swelling  Skin: Skin color, texture normal, no rashes   Neurologic: Alert and oriented X3 , cranial nerves intact, sensory and motor normal,    ECG:    LABS:                          8.3    4.14  )-----------( 256      ( 20 Feb 2022 07:19 )             25.6     02-19    134<L>  |  102  |  15  ----------------------------<  96  4.8   |  25  |  0.90    Ca    8.6      19 Feb 2022 06:41  Phos  2.6     02-19  Mg     2.3     02-19    TPro  6.3  /  Alb  2.3<L>  /  TBili  1.4<H>  /  DBili  x   /  AST  50<H>  /  ALT  61  /  AlkPhos  74  02-19          Pro BNP  4288 02-15 @ 07:08  D Dimer  -- 02-15 @ 07:08  Pro BNP  5332 02-13 @ 21:55  D Dimer  -- 02-13 @ 21:55              RADIOLOGY & ADDITIONAL STUDIES:

## 2022-02-23 NOTE — DISCHARGE NOTE PROVIDER - NSDCCAREPROVSEEN_GEN_ALL_CORE_FT
Vesna, Sonali Smith, Edy De La Cruz, Trip Gilmore, Radames Jimenez, Gagan Montemayor, Mazin Bran, Christofer Espinosa, Duane Chow, José Gonzales, Kristopher Parada, Joseph Verdugo, Minal Pace, Albert Holliday, Casandra Fuentes, Rick Cristina, Chad

## 2022-02-23 NOTE — PROGRESS NOTE ADULT - SUBJECTIVE AND OBJECTIVE BOX
INTERVAL HPI/OVERNIGHT EVENTS:  Patient S&E at bedside. No o/n events, afebrile last night. Out of bed yesterday, reports feeling stronger VS reviewed, fever 100.8 at 8 42 am yesterday. Tolerating diet well. Labs reviewed and stable. Pt given azithromycin yest. CT chest repeated yest Increased lung findings since 2022, favored to represent pulmonary edema. Increased small pleural effusions. Stable subcentimeter nodules in the right lower lobe.     PAST MEDICAL & SURGICAL HISTORY:  Atrial fibrillation    Hypertension    CAD (coronary artery disease)  stent in     Pacemaker    Rheumatic fever  child traylor    Prostate ca  radiation treatment 5yrs ago    Symptomatic bradycardia    Polycythemia vera    Artificial cardiac pacemaker  2006    History of PTCA  with stent to RCA         FAMILY HISTORY:  Family history of bronchiectasis (Mother)        VITAL SIGNS:  T(F): 97.6 (22 @ 08:06)  HR: 70 (22 @ 08:31)  BP: 106/51 (22 @ 08:06)  RR: 18 (22 @ 08:06)  SpO2: 95% (22 @ 08:06)  Wt(kg): --    PHYSICAL EXAM:    Constitutional: NAD, on nc eating breakfast  Respiratory: mild rhonchi on right  Cardiovascular: irregular rhythm  Gastrointestinal: soft, NTND,  Extremities: no c/c/e  Neurological: AAOx3      MEDICATIONS  (STANDING):  apixaban 5 milliGRAM(s) Oral every 12 hours  atorvastatin 40 milliGRAM(s) Oral at bedtime  azithromycin  IVPB 500 milliGRAM(s) IV Intermittent every 24 hours  budesonide 160 MICROgram(s)/formoterol 4.5 MICROgram(s) Inhaler 2 Puff(s) Inhalation two times a day  digoxin     Tablet 250 MICROGram(s) Oral daily  furosemide    Tablet 40 milliGRAM(s) Oral daily  metoprolol tartrate 25 milliGRAM(s) Oral two times a day  pantoprazole    Tablet 40 milliGRAM(s) Oral two times a day  potassium chloride    Tablet ER 10 milliEquivalent(s) Oral daily  tiotropium 18 MICROgram(s) Capsule 1 Capsule(s) Inhalation daily    MEDICATIONS  (PRN):  acetaminophen     Tablet .. 650 milliGRAM(s) Oral every 6 hours PRN Temp greater or equal to 38.5C (101.3F), Moderate Pain (4 - 6)      Allergies    No Known Allergies    Intolerances        LABS:                        9.0    5.75  )-----------( 373      ( 2022 07:08 )             28.0         136  |  101  |  10  ----------------------------<  93  3.9   |  28  |  0.81    Ca    8.2<L>      2022 07:08  Phos  2.3       Mg     2.1             Urinalysis Basic - ( 2022 08:50 )    Color: Yellow / Appearance: Clear / S.010 / pH: x  Gluc: x / Ketone: Negative  / Bili: Negative / Urobili: 4   Blood: x / Protein: 30 mg/dL / Nitrite: Negative   Leuk Esterase: Trace / RBC: x / WBC x   Sq Epi: x / Non Sq Epi: x / Bacteria: x        RADIOLOGY & ADDITIONAL TESTS:  Studies reviewed.

## 2022-02-23 NOTE — PROGRESS NOTE ADULT - TREATMENT GUIDELINE COMMENT
Goals of Care discussed with the patient. This conversation included current condition, prognosis, and options for therapy. Discussed desires by patient for further care and wishes were expressed . Advanced directives discussed.

## 2022-02-23 NOTE — PROGRESS NOTE ADULT - ASSESSMENT
87 y/o male with a PMHx of polycythemia vera, prostate CA, rheumatic fever, CAD s/p CABG,  Afib s/p PPM , HTN, admitted on 2/14  c/o generalized weakness, and  SOB. The pt has been feeling extreme weakness and SOB associated with fever for the past four days; associated with inability to stand, walk. The patient was recently on chemotherapy for lung cancer and about 6 months ago had a GI bleed. Patient is poor historian and history per medical record. Is requiring small doses of pressors since admission.     1. Patient admitted with pneumonia, patient at risk for gram negative rods and other resistant bacteria and is on pressor support  - follow up cultures   - serial cbc and monitor temperature   - iv hydration and supportive care   - oxygen and nebs as needed   - reviewed prior medical records to evaluate for resistant or atypical pathogens   - completed 7 days cefepime, in lieu of this the patient has intermittent fever  - will start doxycycline 100 mg po q 12 hours for 5 days; this antibiotic has antiinflammatory properties; no obvious striking infection apparent  - spoke with family practice resident to investigate his lack of appetite and mild mental status changes, possibly he is depressed?  - fever may be secondary to immunotherapy versus gastritis and/or GI bleed  2. other issues: per medicine

## 2022-02-23 NOTE — PROGRESS NOTE ADULT - ASSESSMENT
87 y/o male with a PMHx of polycythemia vera, prostate CA, rheumatic fever, CAD s/p CABG,  Afib s/p PPM , HTN, NSCLC adenocarcinoma stage 4 on chemo, presented on 02/13 with generalized weakness, and  SOB. Patient admitted with sepsis and acute respiratory failure, treated for HAP with IV cefepime x7 days. 2/22 CT chest results show increase pulmonary edema and pleural effusions, patient started on furosemide for light diuresis. IV azithromycin added for atypical coverage per Dr Cristina (hem onc), ID to reconsult on patient today. Patient afebrile this morning, urine sample to be sent per Primary RN, IV azithromycin to be re-ordered due to not given overnight for unknown reasons.     # Severe sepsis and acute respiratory failure (Improved)  - Currently improved with better breathing, SaO2 in 90s on 3 L NC  - WBC WNL, BCX Urine Cx negative, Lactate now WNL  - S/p 7 day course of cefepime. Continue to observe off antibiotics as per ID  - If pt spikes fever, will re-culture as per Dr. Cristina  - C/w pulmonology recs-Dr Montemayor  - C/w cardiology recs-  Dr. Wyatt  - fever 2/22AM, repeat CXR and CT chest showing increased pulmonary edema and pleural effusions from 2/13 CTA chest, will start patient on oral furosemide  - repeat blood and urine cultures/UA pending  - IV azithromycin ordered for atypical coverage 2/2 fever  - ID re-consulted    # Chronic CHF  -stable, no sxs of overt CHF  -Downtrending BNP 5332->4288  -TTE: EF 55-60%, mod LA dilation  - Continue to monitor     #Elevated troponin  - Type 2 MI  -Probably 2/2 to demand ischemia     #Acute Blood Loss Macrocytic anemia   -B12 mildly elevated, folate wnl   -Possibly 2/2 antineoplastics vs GIB  -s/p 1 U PRBC 2/17 after report of bloody stool, Hgb drop to 6.8  -GI recs: PPI BID x 8 weeks, no EGD now; possible EGD later as outpatient   -Hgb 10>8.3>8.8. No further GIB episodes since eliquis was restarted.  -Stop Eliquis if GI bleeding occurs again. If HgB less than 7, will transfuse      # Hyponatremia  -mild, received IVF, will monitor     #Cad, s/p CABG   - recent cath negative  - C/w beta blocker    #Chronic Afib   -AV pacing on monitor   -cont Digoxin, level 1.42 on 2/19. Continue to monitor  -cont  lopressor 25 mg BID   -Pt is not taking amiodarone due to toxicity  -[id number]: 5      #DVT prophylaxis   -Eliquis  - SCD    #Code Status  -DNR/DNI  -MOLST form signed and in chart

## 2022-02-23 NOTE — DISCHARGE NOTE PROVIDER - NPI NUMBER (FOR [employer] USE ONLY) :
[[id number]],[[id number]],[[id number]] [[id number]],[[id number]],[[id number]],[[id number]],[[id number]]

## 2022-02-23 NOTE — DISCHARGE NOTE PROVIDER - CARE PROVIDERS DIRECT ADDRESSES
,DirectAddress_Unknown,DirectAddress_Unknown,DirectAddress_Unknown,[email or web address] ,DirectAddress_Unknown,DirectAddress_Unknown,DirectAddress_Unknown,[email or web address],DirectAddress_Unknown

## 2022-02-23 NOTE — DISCHARGE NOTE PROVIDER - HOSPITAL COURSE
Pt is a 87 yo M, PMH of NSCLC adenocarcinoma on chemo, polycythemia vera, prostate CA, rheumatic fever, CAD s/p CABG,  Afib s/p PPM, presenting with increasing weakness and fatigue, who came to ED after inability to ambulate. In the ED, pt was found to be SOB, septic w/tachypnea, tachycardia, hypotension. WBC was at 11.46    He was given O2, fluids, and Vanc + Cefepime  However pt was hypotensive after Pt is a 87 yo M, PMH of NSCLC adenocarcinoma on chemo, polycythemia vera, prostate CA, rheumatic fever, CAD s/p CABG,  Afib s/p PPM, presenting with increasing weakness and fatigue, who came to [facility] after inability to ambulate. In the ED, pt was found to be SOB, septic w/tachypnea, tachycardia, hypotension, desaturating into 70s on RA. WBC was at 11.46; lactate elevated x2. CT angio was negative for PE but + for Mosaic pattern in b/l upper lobes and patchy opacities, lower L lobe > lower rt lobe.  TTE showed EF 55-60%, mod LA dilation. He was given O2, fluids, and Vanc + Cefepime, home meds and admitted for pna, however pt was hypotensive after 4 L NS and was brought up to ICU where he was given pressors, steroids and Cefepime. He improved under ICU care and was discharged to the floor. Pt did have episode of afib + rvr and was started on digoxin. He was followed by cardiology, ID, and heme-onc. GI saw pt due to ongoing low HgB and some dark schools, but recommended against scoping at this time. Pulmonary saw pt and starteed symbicort and spiriva for small airway disease. As pt clinically improved, with breathing closer to baseline, pt completed 7 day course of Cefepime to observe pt off abx, however he continued to have intermittent febrile episodes. CT chest was ordered and showed increased lung findings likely pulmonary edema and increased small pleural effusions. Pt was started on doxycycline. Pt is a 87 yo M, PMH of NSCLC adenocarcinoma on chemo, polycythemia vera, prostate CA, rheumatic fever, CAD s/p CABG,  Afib s/p PPM, presenting with increasing weakness and fatigue, who came to [facility] after inability to ambulate. In the ED, pt was found to be SOB, septic w/tachypnea, tachycardia, hypotension, desaturating into 70s on RA. WBC was at 11.46; lactate elevated x2. CT angio was negative for PE but + for Mosaic pattern in b/l upper lobes and patchy opacities, lower L lobe > lower rt lobe.  TTE showed EF 55-60%, mod LA dilation. He was given O2, fluids, and Vanc + Cefepime, home meds and admitted for pna, however pt was hypotensive after 4 L NS and was brought up to ICU where he was given pressors, steroids and Cefepime. He improved under ICU care and was discharged to the floor. Pt did have episode of afib + rvr and was started on digoxin. He was followed by cardiology, ID, and heme-onc. GI saw pt due to ongoing low HgB and some dark schools, but recommended against scoping at this time. Pulmonary saw pt and starteed symbicort and spiriva for small airway disease. As pt clinically improved, with breathing closer to baseline, pt completed 7 day course of Cefepime. He was observed pt off abx, however he continued to have intermittent febrile episodes. CT chest was ordered and showed increased lung findings likely pulmonary edema and increased small pleural effusions. Pt was started on doxycycline to good effect. Today pt is afebrile, hemodynamically stable, still with some fatigue but improved weakness. He will be discharged today to subacute rehab and also for outpatient follow up with his primary care provider, cardiologist, pulmonologist, and oncologist.    Subjective: Pt reports no acute events overnight. He endorses continual improvement on his weakness, but does desire further rehab to return to baseline as closely as possible. Pt reports that his breathing is fine with no dyspnea. Pt denies ha, dizziness, cp, n/v, changes in urination or bm. No other complaints or symptoms noted at this time.  ROS as stated above.    Vital Signs Last 24 Hrs  T(C): 36.3 (24 Feb 2022 07:29), Max: 37.2 (23 Feb 2022 22:45)  T(F): 97.3 (24 Feb 2022 07:29), Max: 98.9 (23 Feb 2022 22:45)  HR: 72 (24 Feb 2022 07:29) (69 - 72)  BP: 102/53 (24 Feb 2022 07:29) (93/52 - 104/52)  BP(mean): --  RR: 18 (24 Feb 2022 07:29) (18 - 18)  SpO2: 95% (24 Feb 2022 07:29) (95% - 99%)    Physical Exam  GENERAL: NAD, Lying in the bed comfortably  HEAD:  Atraumatic, Normocephalic  EYES: EOMI, PERRLA, conjunctiva and sclera clear  ENT: Moist mucous membranes  NECK: Supple, No JVD  CHEST/LUNG: Improved. lung sounds clear b/l with unlabored respirations.   HEART: RRR.  No murmurs, rubs, or gallops  ABDOMEN: Bowel sounds present; Soft, Nontender, Nondistended. No hepatomegaly  EXTREMITIES:  2+ Peripheral Pulses  NERVOUS SYSTEM:  Alert & Oriented X3, speech clear. No deficits   MSK: FROM all 4 extremities, full and equal strength  SKIN: Ecchymosis noted on the upper extremities bilaterally    EKG/RADIOLOGY  < from: Xray Chest 1 View-PORTABLE IMMEDIATE (Xray Chest 1 View-PORTABLE IMMEDIATE .) (02.19.22 @ 06:33) >  Findings/  Impression: Status post CABG. status post pacemaker. No lung   consolidations. Trace right pleural effusion.    --- End of Report ---    < from: CT Chest No Cont (02.22.22 @ 09:49) >  IMPRESSION:  Increased lung findings since 2/13/2022, favored to represent pulmonary   edema.  Increased small pleural effusions.  Stable subcentimeter nodules in the right lower lobe. 3 month follow-up   is recommended.    < from: CT Angio Chest PE Protocol w/ IV Cont (02.13.22 @ 23:16) >  IMPRESSION:  No pulmonary embolism.    Mosaic attenuation pattern to the bilateral upper lobes which may be   related to edema or air trapping or small airways disease. Patchy   opacities in the left lower lobe and to a lesser extent in the right   lower lobe which may represent infection. Correlate clinically. Follow   these findings to resolution to exclude other processes.    < end of copied text >  < from: Xray Chest 1 View- PORTABLE-Urgent (Xray Chest 1 View- PORTABLE-Urgent .) (02.13.22 @ 22:49) >  IMPRESSION:   No radiographic evidence of active chest disease. Pt is a 87 yo M, PMH of NSCLC adenocarcinoma on chemo, polycythemia vera, prostate CA, rheumatic fever, CAD s/p CABG,  Afib s/p PPM, presenting with increasing weakness and fatigue, who came to [facility] after inability to ambulate. In the ED, pt was found to be SOB, septic w/tachypnea, tachycardia, hypotension, desaturating into 70s on RA. WBC was at 11.46; lactate elevated x2. CT angio was negative for PE but + for Mosaic pattern in b/l upper lobes and patchy opacities, lower L lobe > lower rt lobe.  TTE showed EF 55-60%, mod LA dilation. He was given O2, fluids, and Vanc + Cefepime, home meds and admitted for pna, however pt was hypotensive after 4 L NS and was brought up to ICU where he was given pressors, steroids and Cefepime. He improved under ICU care and was discharged to the floor. Pt did have episode of afib + rvr and was started on digoxin. He was followed by cardiology, ID, and heme-onc. GI saw pt due to ongoing low HgB and some dark schools, but recommended against scoping at this time. Pulmonary saw pt and starteed symbicort and spiriva for small airway disease. As pt clinically improved, with breathing closer to baseline, pt completed 7 day course of Cefepime. He was observed pt off abx, however he continued to have intermittent febrile episodes. CT chest was ordered and showed increased lung findings likely pulmonary edema and increased small pleural effusions. Pt was started on doxycycline to good effect. Today pt is afebrile, hemodynamically stable, still with some fatigue but improved weakness. He will be discharged today to subacute rehab and also for outpatient follow up with his primary care provider, cardiologist, pulmonologist, and oncologist.    Subjective: Pt reports no acute events overnight. He endorses continual improvement on his weakness, but does desire further rehab to return to baseline as closely as possible. Pt reports that his breathing is fine with no dyspnea. Pt denies ha, dizziness, cp, n/v, changes in urination or bm. No other complaints or symptoms noted at this time.  ROS as stated above. Pt's family is at bedside. Discussed plan in detail, all questions and issues answered. Pt and family is amenable.    Vital Signs Last 24 Hrs  T(C): 36.4 (25 Feb 2022 09:30), Max: 37.6 (24 Feb 2022 22:50)  T(F): 97.6 (25 Feb 2022 09:30), Max: 99.6 (24 Feb 2022 22:50)  HR: 71 (25 Feb 2022 09:30) (69 - 73)  BP: 125/66 (25 Feb 2022 09:30) (99/57 - 125/66)  BP(mean): --  RR: 18 (25 Feb 2022 08:14) (16 - 18)  SpO2: 99% (25 Feb 2022 08:14) (96% - 99%)    Physical Exam  GENERAL: NAD, Lying in the bed comfortably  HEAD:  Atraumatic, Normocephalic  EYES: EOMI, PERRLA, conjunctiva and sclera clear  ENT: Moist mucous membranes  NECK: Supple, No JVD  CHEST/LUNG: Improved. lung sounds clear b/l with unlabored respirations.   HEART: RRR.  No murmurs, rubs, or gallops  ABDOMEN: Bowel sounds present; Soft, Nontender, Nondistended. No hepatomegaly  EXTREMITIES:  2+ Peripheral Pulses  NERVOUS SYSTEM:  Alert & Oriented X3, speech clear. No deficits   MSK: FROM all 4 extremities, full and equal strength  SKIN: Ecchymosis noted on the upper extremities bilaterally    EKG/RADIOLOGY  < from: Xray Chest 1 View-PORTABLE IMMEDIATE (Xray Chest 1 View-PORTABLE IMMEDIATE .) (02.19.22 @ 06:33) >  Findings/  Impression: Status post CABG. status post pacemaker. No lung   consolidations. Trace right pleural effusion.    < from: CT Chest No Cont (02.22.22 @ 09:49) >  IMPRESSION:  Increased lung findings since 2/13/2022, favored to represent pulmonary   edema.  Increased small pleural effusions.  Stable subcentimeter nodules in the right lower lobe. 3 month follow-up   is recommended.    < from: CT Angio Chest PE Protocol w/ IV Cont (02.13.22 @ 23:16) >  IMPRESSION:  No pulmonary embolism.    Mosaic attenuation pattern to the bilateral upper lobes which may be   related to edema or air trapping or small airways disease. Patchy   opacities in the left lower lobe and to a lesser extent in the right   lower lobe which may represent infection. Correlate clinically. Follow   these findings to resolution to exclude other processes.    < from: Xray Chest 1 View- PORTABLE-Urgent (Xray Chest 1 View- PORTABLE-Urgent .) (02.13.22 @ 22:49) >  IMPRESSION:   No radiographic evidence of active chest disease.    < from: CT Chest No Cont (02.22.22 @ 09:49) >  IMPRESSION:  Increased lung findings since 2/13/2022, favored to represent pulmonary   edema.  Increased small pleural effusions.  Stable subcentimeter nodules in the right lower lobe. 3 month follow-up   is recommended.    < from: Xray Chest 1 View- PORTABLE-Routine (Xray Chest 1 View- PORTABLE-Routine .) (02.24.22 @ 10:01) >  Frontal expiratory view of the chest shows the heart to be normal in   size. Sternal wires and left cardiac pacemaker are again noted.  The lungs show partial clearing of the lungs with similar small right   effusion and there is no evidence of pneumothorax nor left pleural   effusion.  IMPRESSION:  Resolving infiltrates.

## 2022-02-23 NOTE — PROGRESS NOTE ADULT - SUBJECTIVE AND OBJECTIVE BOX
CHIEF COMPLAINT:  Patient is a 86y old  Male who presents with a chief complaint of acute hypoxic respiratory failure  PNA (15 Feb 2022 16:25)      HPI: 2/15/22:  85 y/o male known to me with a PMHx of polycythemia vera, prostate CA, rheumatic fever, CAD s/p CABG,  chronic Afib s/p PPM , HTN, ? Prostate CA  presents to the ED c/o generalized weakness, and  SOB. The pt has been feeling extreme weakness and SOB associated with fever for the past four days; hence, his wife advised him to visit [facility] for further evaluation. He claims that his weakness was so severe that he could not get up from his chair or walk properly. In addition, his PO intake decreased drastically . Pt denied chest pain, nausea, vomiting, loc, history of fall, diarrhea, and ID bleeding.   Pt received chemotherapy 3 To 4 weeks back for his lung cancer, and subsequent scheduled chemotherapy is in one week. Then, last October, he was admitted to [facility] for GI bleeding.   Currently he denies anginal chest pains, palpitations, dizziness or syncope.  He had an echo today showing normal LV systolic function and LVEF approx. 55-60% with moderately dilated LA and mild MR, TR, AI and PI.  He has no new cardiac symptoms otherwise.    22:  Less SOB.  No anginal chest pains or other cardiac changes.  A-V pacing on the monitor.    22:  Slowly improving.  Remains afebrile.  H&H low but no obvious bleeding.  A-v pacing on the monitor.  No new cardiac symptoms.    22:  Feeling good.  A-V pacing on the monitor. No new cardiac symptoms.  Was scheduled for chemoRx at [place] next week but this was cancelled due to his PNA.    22;  Feeling better with less SOB.  A-V pacing on the monitor with occasional AF with controlled VR.  No new cardiac symptoms.  H/H 8.3 twice yesterday.    22:  Continue to feel good.  Hgb up to 8.8 yesterday.  Maintaining A-V pacing on the monitor.  No new cardiac issues so far.  Eager to go home.    22:  Still with low grade temps to 100.8 & 100.1 but now 99.6.  Maintaining A-V pacing on the monitor.  Hgb=9.1 yesterday and continues to improve. CT of Chest as below.  No new cardiac issues so far.        PMHx:  PAST MEDICAL & SURGICAL HISTORY:  Atrial fibrillation  Hypertension  CAD (coronary artery disease)-stent in   CABG  Pacemaker  Rheumatic fever-child traylor  Prostate ca-radiation treatment 5yrs ago  Symptomatic bradycardia  Polycythemia vera  Artificial cardiac pacemaker-  History of PTCA-with stent to RCA       FAMILY HISTORY:   FAMILY HISTORY:-  Family history of bronchiectasis (Mother)      ALLERGIES:  Allergies  No Known Allergies      REVIEW OF SYSTEMS:  10 point ROS was obtained  Pertinent positives and negatives are as above  All other review of systems is negative unless indicated above      Vital Signs Last 24 Hrs  T(C): 37.8 (2022 23:00), Max: 38.2 (2022 08:42)  T(F): 100.1 (2022 23:00), Max: 100.8 (2022 08:42)  HR: 80 (2022 20:38) (70 - 82)  BP: 93/47 (2022 20:12) (93/47 - 99/55)  BP(mean): 55 (2022 20:12) (55 - 55)  RR: 17 (2022 20:12) (17 - 17)  SpO2: 94% (2022 20:12) (93% - 94%)      I&O's Summary    2022 07:01  -  15 Feb 2022 07:00  --------------------------------------------------------  IN: 829 mL / OUT: 1975 mL / NET: -1146 mL    15 Feb 2022 07:01  -  15 Feb 2022 20:06  --------------------------------------------------------  IN: 60.7 mL / OUT: 0 mL / NET: 60.7 mL        PHYSICAL EXAM:   Constitutional: NAD, awake and alert, well-developed  HEENT: PERR, EOMI, Normal Hearing, MMM  Neck: Soft and supple, No LAD, No JVD  Respiratory: Breath sounds are clear bilaterally, No wheezing, rales or rhonchi, PPM in left chest wall  Cardiovascular: S1 and S2, regular rate and regular rhythm, soft ANA M at LLSB and base as before, no gallops or rubs  Gastrointestinal: Bowel Sounds present, soft, nontender, nondistended, no guarding, no rebound  Extremities: No peripheral edema  Vascular: 2+ peripheral pulses  Neurological: A/O x 3, no focal deficits  Musculoskeletal: 5/5 strength b/l upper and lower extremities      MEDICATIONS  (STANDING):  apixaban 5 milliGRAM(s) Oral every 12 hours  atorvastatin 40 milliGRAM(s) Oral at bedtime  azithromycin  IVPB 500 milliGRAM(s) IV Intermittent every 24 hours  budesonide 160 MICROgram(s)/formoterol 4.5 MICROgram(s) Inhaler 2 Puff(s) Inhalation two times a day  digoxin     Tablet 250 MICROGram(s) Oral daily  furosemide    Tablet 40 milliGRAM(s) Oral daily  metoprolol tartrate 25 milliGRAM(s) Oral two times a day  pantoprazole    Tablet 40 milliGRAM(s) Oral two times a day  potassium chloride    Tablet ER 10 milliEquivalent(s) Oral daily  tiotropium 18 MICROgram(s) Capsule 1 Capsule(s) Inhalation daily    MEDICATIONS  (PRN):  acetaminophen     Tablet .. 650 milliGRAM(s) Oral every 6 hours PRN Temp greater or equal to 38.5C (101.3F), Moderate Pain (4 - 6)      LABS: All Labs Reviewed:                        9.1    5.13  )-----------( 344      ( 2022 07:59 )             28.8                           8.8    4.36  )-----------( 300      ( 2022 11:34 )             26.3                           8.3    x     )-----------( x        ( 2022 18:01 )             25.0                           6.8    6.15  )-----------( 148      ( 2022 06:23 )             20.8                           7.9    4.49  )-----------( 96       ( 15 Feb 2022 06:59 )             23.9       02-    135  |  103  |  10  ----------------------------<  95  4.1   |  27  |  0.72    Ca    8.0<L>      2022 07:59  Phos  2.0     02-  Mg     2.1         136  |  102  |  14  ----------------------------<  132<H>  3.7   |  29  |  0.84    Ca    8.0<L>      2022 11:34      -    136  |  103  |  15  ----------------------------<  91  3.2<L>   |  27  |  0.72    Ca    7.9      2022 07:19        02-    139  |  109<H>  |  24<H>  ----------------------------<  143<H>  4.3   |  25  |  1.02    Ca    8.5      2022 06:23      02-    135  |  104  |  23  ----------------------------<  167<H>  3.8   |  23  |  0.88    Ca    8.2<L>      2022 06:51    TPro  5.5<L>  /  Alb  1.8<L>  /  TBili  1.0  /  DBili  0.4<H>  /  AST  35  /  ALT  33  /  AlkPhos  57  02-15      02-15    135  |  105  |  17  ----------------------------<  161<H>  3.6   |  22  |  0.73    Ca    8.0<L>      15 Feb 2022 06:59    TPro  5.5<L>  /  Alb  1.8<L>  /  TBili  1.0  /  DBili  0.4<H>  /  AST  35  /  ALT  33  /  AlkPhos  57  02-15    PT/INR - ( 15 Feb 2022 06:59 )   PT: 18.3 sec;   INR: 1.61 ratio       PTT - ( 2022 21:55 )  PTT:33.8 sec    Troponin I, High Sensitivity (22 @ 21:55): 91.23    Serum Pro-Brain Natriuretic Peptide: 4288 pg/mL (02-15 @ 07:08)  Serum Pro-Brain Natriuretic Peptide: 5332 pg/mL ( @ 21:55)    Digoxin Level, Serum (22 @ 06:41): 1.42 ng/mL   Digoxin Level, Serum (02.15.22 @ 06:59): .86 ng/mL     CULTURES:   22 & 22:  Organism --  Gram Stain Blood -- Gram Stain --  Specimen Source .Blood None  Culture-Blood --No growth to date.     22:  Organism --  Gram Stain Urine -- Gram Stain --  Specimen Source Clean Catch None  Culture-Urine --No growth       LIPID PROFILE     RADIOLOGY:   CXR: 22:  Frontal expiratory view of the chest shows the heart to be similar in size. Sternal wires and left cardiac pacemaker are again noted.  The lungs show progression of right upper lobe infiltrate and there is no evidence of pneumothorax nor pleural effusion.  IMPRESSION:  Right upper lobe infiltrate.    CT of Chest: 22:  FINDINGS:  LUNGS/AIRWAYS/PLEURA: Increased largely central ground glass and interlobular septal thickening in all lobes of the right lung and left upper lobe. Stable few subcentimeter nodules in the right lower lobe, for example, 4-198. Right upper lobe wedge resection. Patent trachea and bronchi. Increased small pleural effusions.  LYMPH NODES/MEDIASTINUM: No enlarged lymph nodes.  HEART/VASCULATURE: Normal heart size. Unremarkable pericardium. CABG. Mildly calcified aortic valve and aorta. Normal caliber aorta and main pulmonary artery. Appropriate course of dual-chamber pacemaker.  UPPER ABDOMEN: Cholelithiasis. Gastric fundal diverticulum.  BONES/SOFT TISSUES: Sternotomy. Degenerative changes of the spine.  IMPRESSION:  Increased lung findings since 2022, favored to represent pulmonary edema.  Increased small pleural effusions.  Stable subcentimeter nodules in the right lower lobe. 3 month follow-up is recommended.     CXR: 22:  Findings/  Impression: Status post CABG. status post pacemaker. No lung   consolidations. Trace right pleural effusion.    CXR: 22:  FINDINGS:  CATHETERS AND TUBES: None  PULMONARY: The visualized lungs are clear of airspace consolidations or effusions. No pneumothorax.  HEART/VASCULAR: The  heart is mildly enlarged in transverse diameter. Status postcoronary artery bypass graft procedure. Cardiac device wire leads are within right atrium and right ventricle. .  BONES: Visualized osseous structures are intact.  IMPRESSION:   No radiographic evidence of active chest disease.      CTA of Chest: 22:  FINDINGS:  LUNGS AND AIRWAYS: Patent central airways.  Right upper lobe postsurgical changes. Mosaic attenuation pattern to the bilateral upper lobes which may be related to edema or air trapping or small airways disease. Patchy opacities in the left lower lobe and to a lesser extent in the right lower lobe which may represent infection. Correlate clinically. Follow these findings to resolution to exclude other processes.  PLEURA: Trace bilateral pleural fluid.  MEDIASTINUM AND KATELYN: No lymphadenopathy.  VESSELS: No pulmonary embolism.  HEART: Cardiomegaly. Coronary artery calcifications. No pericardial effusion.  CHEST WALL AND LOWER NECK: Within normal limits.  VISUALIZED UPPER ABDOMEN: Within normal limits.  BONES: Sternotomy. Degenerative changes.  IMPRESSION:  No pulmonary embolism.  Mosaic attenuation pattern to the bilateral upper lobes which may be related to edema or air trapping or small airways disease. Patchy opacities in the left lower lobe and to a lesser extent in the right lower lobe which may represent infection. Correlate clinically. Follow these findings to resolution to exclude other processes.      EK22:  Wide QRS tachycardia  Right bundle branch block      TELEMETRY:  A-V pacing with underlying AF    ECHO:  2/15/22:  M-Mode Measurements (cm):   LVEDd: 5.66 cm            LVESd: 4.04 cm   IVSEd: 0.85 cm   LVPWd: 0.97 cm            AO Root Dimension: 3.8 cm                       ACS: 2.1 cm                             LA: 5.3 cm                             LVOT: 2.2 cm  Doppler Measurements:   AV Velocity:195 cm/s                MV Peak E-Wave: 68.6 cm/s   AV Peak Gradient: 15.21 mmHg        MV Peak A-Wave: 52.3 cm/s                                       MV E/A Ratio: 1.31 %   TR Velocity:305 cm/s                MV Peak Gradient: 1.88 mmHg   TR Gradient:37.21 mmHg   Estimated RAP:5 mmHg   RVSP:42 mmHg    Findings:  Mitral Valve:   Fibrocalcific changes noted to the mitral valve leaflets with preserved leaflet excursion.   Mild mitral regurgitation is present.    Aortic Valve:   Fibrocalcific changes noted to the Aortic valve leaflets with mildly reduced mobility but does not meet criteria for aortic stenosis.   Trace to mild aortic regurgitation is present.    Tricuspid Valve:   Normal appearing tricuspid valve structure.   Mild (1+) tricuspid valve regurgitation is present.   Mild pulmonary hypertension.    Pulmonic Valve:   Normal appearing pulmonic valve structure and function.    Left Atrium:   The left atrium is moderately dilated.    Left Ventricle:   The left ventricle is normal in size, wall thickness, wall motion and contractility.   Estimated left ventricular ejection fraction is 55-60 %.    Right Atrium:   Normal appearing right atrium.    Right Ventricle:   Normal appearing right ventricle structure and function.   A device wire is seen in the RV and RA.    Pericardial Effusion:   No evidence of pericardial effusion.    Miscellaneous:   IVC was not visualized.   No subcostal window seen.    Summary:   The left ventricle is normal in size, wall thickness, wall motion and contractility.   Estimated left ventricular ejection fraction is 55-60 %.   The left atrium is moderately dilated.   Normal appearing right atrium.   Normal appearing right ventricle structure and function.   A device wire is seen in the RV and RA.   Fibrocalcific changes noted to the Aortic valve leaflets with mildly reduced mobility but does not meet criteria for aortic stenosis.   Trace to mild aortic regurgitation is present.   Normal appearing tricuspid valve structure.   Mild (1+) tricuspid valve regurgitation is present.   Mild pulmonary hypertension.   No evidence of pericardial effusion.    Signature:   ----------------------------------------------------------------   Electronically signed by Kory Ibarra MD(Interpreting physician)   on 02/15/2022 06:19 PM   ----------------------------------------------------------------

## 2022-02-23 NOTE — DISCHARGE NOTE PROVIDER - NSDCCPCAREPLAN_GEN_ALL_CORE_FT
PRINCIPAL DISCHARGE DIAGNOSIS  Diagnosis: Pneumonia  Assessment and Plan of Treatment: You had increasing weakness and fatigue, and came to the hospital  after inability to ambulate. In the emergency department, you were septic with fast heart rate and respiratory rate with oxygen desaturating into 70s. Your blood lactate was elevated x2 and your CT angio imaging was negative for pulmonary embolism but positive for Mosaic pattern in b/l upper lobes and patchy opacities, lower L lobe > lower rt lobe.  Cardiac echo showed ejection fraction of55-60%, and moderate left atiral dilation. You were given O2, fluids, the antibiotics, vancomycin and cefepime, and home meds and admitted for pneumonia, however you had hypotension after 4 L of fluid and was brought up to ICU where he was given pressors, steroids and Cefepime. You improved under ICU care and was discharged to the floor. You did have episode of atrial fibrillation and was started on digoxin. You were followed by cardiology, infectious disease, and heme-onc. GI saw you due to ongoing low blood hemoglobin and some dark schools, but recommended against scoping at this time. Pulmonary saw you and starteed symbicort and spiriva for small airway disease. As you clinically improved, with breathing closer to baseline, you completed 7 day course of Cefepime and was observed off antibiotcs, but you continued to have intermittent febrile episodes. CT chest was imaging ordered and showed increased lung findings likely pulmonary edema and increased small pleural effusions. You were started on doxycycline to good effect. Today you are stable still with some fatigue but improved weakness. You will be discharged today to subacute rehab and also for outpatient follow up with your primary care provider, cardiologist, pulmonologist, and oncologist. Please make appointments with your doctors and take your medications as described. Thank you very much!      SECONDARY DISCHARGE DIAGNOSES  Diagnosis: Acute respiratory failure with hypoxia  Assessment and Plan of Treatment: The respiratory failure was due to pnumonia. See above.     PRINCIPAL DISCHARGE DIAGNOSIS  Diagnosis: Pneumonia  Assessment and Plan of Treatment: You had increasing weakness and fatigue, and came to the hospital  after inability to ambulate. In the emergency department, you were septic with fast heart rate and respiratory rate with oxygen desaturating into 70s. Your blood lactate was elevated x2 and your CT angio imaging was negative for pulmonary embolism but positive for Mosaic pattern in b/l upper lobes and patchy opacities, lower L lobe > lower rt lobe.  Cardiac echo showed ejection fraction of55-60%, and moderate left atiral dilation. You were given O2, fluids, the antibiotics, vancomycin and cefepime, and home meds and admitted for pneumonia, however you had hypotension after 4 L of fluid and was brought up to ICU where he was given pressors, steroids and Cefepime. You improved under ICU care and was discharged to the floor. You did have episode of atrial fibrillation and was started on digoxin. You were followed by cardiology, infectious disease, and heme-onc. GI saw you due to ongoing low blood hemoglobin and some dark schools, but recommended against scoping at this time. Pulmonary saw you and starteed symbicort and spiriva for small airway disease. As you clinically improved, with breathing closer to baseline, you completed 7 day course of Cefepime and was observed off antibiotcs, but you continued to have intermittent febrile episodes. CT chest was imaging ordered and showed increased lung findings likely pulmonary edema and increased small pleural effusions. You were started on doxycycline to good effect. Today you are stable still with some fatigue but improved weakness. You will be discharged today to subacute rehab and also for outpatient follow up with your primary care provider, cardiologist, pulmonologist, and oncologist. Please make appointments with your doctors and take your medications as described. This includes finishing your doxycycline abx (100 mg twice a day; you are on day 3/5)  and continuing your digoxin 250 mcg daily.  Thank you very much!      SECONDARY DISCHARGE DIAGNOSES  Diagnosis: Acute respiratory failure with hypoxia  Assessment and Plan of Treatment: The respiratory failure was due to pnumonia. See above.

## 2022-02-23 NOTE — PROGRESS NOTE ADULT - ATTENDING COMMENTS
Stage 4 lung CA  SP Cefepime for cancer  Now on doxycycline for 7 days  Still spiking temps  For placement

## 2022-02-24 LAB
ANION GAP SERPL CALC-SCNC: 6 MMOL/L — SIGNIFICANT CHANGE UP (ref 5–17)
BUN SERPL-MCNC: 10 MG/DL — SIGNIFICANT CHANGE UP (ref 7–23)
CALCIUM SERPL-MCNC: 8.1 MG/DL — LOW (ref 8.5–10.1)
CHLORIDE SERPL-SCNC: 103 MMOL/L — SIGNIFICANT CHANGE UP (ref 96–108)
CO2 SERPL-SCNC: 29 MMOL/L — SIGNIFICANT CHANGE UP (ref 22–31)
CREAT SERPL-MCNC: 0.64 MG/DL — SIGNIFICANT CHANGE UP (ref 0.5–1.3)
CULTURE RESULTS: NO GROWTH — SIGNIFICANT CHANGE UP
CULTURE RESULTS: SIGNIFICANT CHANGE UP
CULTURE RESULTS: SIGNIFICANT CHANGE UP
GLUCOSE SERPL-MCNC: 82 MG/DL — SIGNIFICANT CHANGE UP (ref 70–99)
HCT VFR BLD CALC: 25.9 % — LOW (ref 39–50)
HGB BLD-MCNC: 8.5 G/DL — LOW (ref 13–17)
MAGNESIUM SERPL-MCNC: 2.2 MG/DL — SIGNIFICANT CHANGE UP (ref 1.6–2.6)
MCHC RBC-ENTMCNC: 32.2 PG — SIGNIFICANT CHANGE UP (ref 27–34)
MCHC RBC-ENTMCNC: 32.8 GM/DL — SIGNIFICANT CHANGE UP (ref 32–36)
MCV RBC AUTO: 98.1 FL — SIGNIFICANT CHANGE UP (ref 80–100)
PHOSPHATE SERPL-MCNC: 2.5 MG/DL — SIGNIFICANT CHANGE UP (ref 2.5–4.5)
PLATELET # BLD AUTO: 367 K/UL — SIGNIFICANT CHANGE UP (ref 150–400)
POTASSIUM SERPL-MCNC: 3.7 MMOL/L — SIGNIFICANT CHANGE UP (ref 3.5–5.3)
POTASSIUM SERPL-SCNC: 3.7 MMOL/L — SIGNIFICANT CHANGE UP (ref 3.5–5.3)
RBC # BLD: 2.64 M/UL — LOW (ref 4.2–5.8)
RBC # FLD: 19.7 % — HIGH (ref 10.3–14.5)
SODIUM SERPL-SCNC: 138 MMOL/L — SIGNIFICANT CHANGE UP (ref 135–145)
SPECIMEN SOURCE: SIGNIFICANT CHANGE UP
WBC # BLD: 5.49 K/UL — SIGNIFICANT CHANGE UP (ref 3.8–10.5)
WBC # FLD AUTO: 5.49 K/UL — SIGNIFICANT CHANGE UP (ref 3.8–10.5)

## 2022-02-24 PROCEDURE — 99232 SBSQ HOSP IP/OBS MODERATE 35: CPT | Mod: GC

## 2022-02-24 PROCEDURE — 71045 X-RAY EXAM CHEST 1 VIEW: CPT | Mod: 26

## 2022-02-24 RX ORDER — RAMIPRIL 5 MG
1 CAPSULE ORAL
Qty: 0 | Refills: 0 | DISCHARGE

## 2022-02-24 RX ORDER — TIOTROPIUM BROMIDE 18 UG/1
1 CAPSULE ORAL; RESPIRATORY (INHALATION)
Qty: 0 | Refills: 0 | DISCHARGE
Start: 2022-02-24

## 2022-02-24 RX ADMIN — TIOTROPIUM BROMIDE 1 CAPSULE(S): 18 CAPSULE ORAL; RESPIRATORY (INHALATION) at 08:34

## 2022-02-24 RX ADMIN — Medication 25 MILLIGRAM(S): at 11:01

## 2022-02-24 RX ADMIN — Medication 40 MILLIGRAM(S): at 11:02

## 2022-02-24 RX ADMIN — Medication 250 MICROGRAM(S): at 11:49

## 2022-02-24 RX ADMIN — BUDESONIDE AND FORMOTEROL FUMARATE DIHYDRATE 2 PUFF(S): 160; 4.5 AEROSOL RESPIRATORY (INHALATION) at 19:48

## 2022-02-24 RX ADMIN — APIXABAN 5 MILLIGRAM(S): 2.5 TABLET, FILM COATED ORAL at 21:40

## 2022-02-24 RX ADMIN — Medication 100 MILLIGRAM(S): at 11:01

## 2022-02-24 RX ADMIN — Medication 25 MILLIGRAM(S): at 21:40

## 2022-02-24 RX ADMIN — Medication 100 MILLIGRAM(S): at 21:40

## 2022-02-24 RX ADMIN — BUDESONIDE AND FORMOTEROL FUMARATE DIHYDRATE 2 PUFF(S): 160; 4.5 AEROSOL RESPIRATORY (INHALATION) at 08:34

## 2022-02-24 RX ADMIN — PANTOPRAZOLE SODIUM 40 MILLIGRAM(S): 20 TABLET, DELAYED RELEASE ORAL at 11:02

## 2022-02-24 RX ADMIN — APIXABAN 5 MILLIGRAM(S): 2.5 TABLET, FILM COATED ORAL at 11:02

## 2022-02-24 RX ADMIN — Medication 10 MILLIEQUIVALENT(S): at 11:02

## 2022-02-24 RX ADMIN — ATORVASTATIN CALCIUM 40 MILLIGRAM(S): 80 TABLET, FILM COATED ORAL at 21:40

## 2022-02-24 RX ADMIN — PANTOPRAZOLE SODIUM 40 MILLIGRAM(S): 20 TABLET, DELAYED RELEASE ORAL at 21:40

## 2022-02-24 NOTE — PROGRESS NOTE ADULT - SUBJECTIVE AND OBJECTIVE BOX
HPI: 87 y/o male with a PMHx of polycythemia vera, prostate CA, rheumatic fever, CAD s/p CABG,  Afib s/p PPM , HTN, ? Prostate CA  admitted with septic shock 2/2 HAP s/p pressors in CCU now in floors.    Subjective: No events overnight-- pt was monitored off abx and had no episodes of fevers or chills. Today pt is "feeling better," endorsing improvement in breathing although he c/o some weakness. No ha, dizziness, SOB, chest pain, n/v. No other symptoms or complaints reported. ROS as stated above.    Vital Signs Last 24 Hrs  T(C): 37.5 (21 Feb 2022 08:00), Max: 39 (20 Feb 2022 16:21)  T(F): 99.5 (21 Feb 2022 08:00), Max: 102.2 (20 Feb 2022 16:21)  HR: 70 (21 Feb 2022 15:10) (70 - 78)  BP: 116/51 (21 Feb 2022 15:10) (95/47 - 119/61)  BP(mean): --  RR: 17 (21 Feb 2022 15:10) (17 - 18)  SpO2: 95% (21 Feb 2022 15:10) (91% - 99%)    GENERAL: NAD, Lying in the bed comfortably  HEAD:  Atraumatic, Normocephalic  EYES: EOMI, PERRLA, conjunctiva and sclera clear  ENT: Moist mucous membranes  NECK: Supple, No JVD  CHEST/LUNG: No crackles. +wheezing diffuse.  Unlabored respirations.   HEART: RRR.  No murmurs, rubs, or gallops  ABDOMEN: Bowel sounds present; Soft, Nontender, Nondistended. No hepatomegaly  EXTREMITIES:  2+ Peripheral Pulses  NERVOUS SYSTEM:  Alert & Oriented X3, speech clear. No deficits   MSK: FROM all 4 extremities, full and equal strength  SKIN: Ecchymosis noted on the upper extremities bilaterally    MEDICATIONS:  MEDICATIONS  (STANDING):  apixaban 5 milliGRAM(s) Oral every 12 hours  atorvastatin 40 milliGRAM(s) Oral at bedtime  budesonide 160 MICROgram(s)/formoterol 4.5 MICROgram(s) Inhaler 2 Puff(s) Inhalation two times a day  digoxin     Tablet 250 MICROGram(s) Oral daily  metoprolol tartrate 25 milliGRAM(s) Oral two times a day  pantoprazole    Tablet 40 milliGRAM(s) Oral two times a day  potassium chloride    Tablet ER 10 milliEquivalent(s) Oral daily  tiotropium 18 MICROgram(s) Capsule 1 Capsule(s) Inhalation daily    MEDICATIONS  (PRN):  acetaminophen     Tablet .. 650 milliGRAM(s) Oral every 6 hours PRN Temp greater or equal to 38.5C (101.3F), Moderate Pain (4 - 6)      LABS: All Labs Reviewed:                        8.8    4.36  )-----------( 300      ( 21 Feb 2022 11:34 )             26.3     02-21    136  |  102  |  14  ----------------------------<  132<H>  3.7   |  29  |  0.84    Ca    8.0<L>      21 Feb 2022 11:34            Blood Culture: 02-19 @ 08:14  Organism --  Gram Stain Blood -- Gram Stain --  Specimen Source Clean Catch Clean Catch (Midstream)  Culture-Blood --    02-19 @ 06:41  Organism --  Gram Stain Blood -- Gram Stain --  Specimen Source .Blood None  Culture-Blood --      I&O's Summary    20 Feb 2022 07:01  -  21 Feb 2022 07:00  --------------------------------------------------------  IN: 0 mL / OUT: 700 mL / NET: -700 mL      CAPILLARY BLOOD GLUCOSE      EKG/RADIOLOGY  < from: Xray Chest 1 View-PORTABLE IMMEDIATE (Xray Chest 1 View-PORTABLE IMMEDIATE .) (02.19.22 @ 06:33) >  Findings/  Impression: Status post CABG. status post pacemaker. No lung   consolidations. Trace right pleural effusion.    --- End of Report ---    < end of copied text >  < from: CT Angio Chest PE Protocol w/ IV Cont (02.13.22 @ 23:16) >  IMPRESSION:    No pulmonary embolism.    Mosaic attenuation pattern to the bilateral upper lobes which may be   related to edema or air trapping or small airways disease. Patchy   opacities in the left lower lobe and to a lesser extent in the right   lower lobe which may represent infection. Correlate clinically. Follow   these findings to resolution to exclude other processes.    < from: Xray Chest 1 View- PORTABLE-Urgent (Xray Chest 1 View- PORTABLE-Urgent .) (02.13.22 @ 22:49) >  IMPRESSION:   No radiographic evidence of active chest disease.    < from: Xray Chest 1 View- PORTABLE-Routine (Xray Chest 1 View- PORTABLE-Routine .) (02.24.22 @ 10:01) >  INTERPRETATION:  Chest one view    HISTORY: Dyspnea    COMPARISON STUDY: 2/22/2022    Frontal expiratory view of the chest shows the heart to be normal in   size. Sternal wires and left cardiac pacemaker are again noted.    The lungs show partial clearing of the lungs with similar small right   effusion and there is no evidence of pneumothorax nor left pleural   effusion.    IMPRESSION:  Resolving infiltrates.    < end of copied text >         HPI: 85 y/o male with a PMHx of polycythemia vera, prostate CA, rheumatic fever, CAD s/p CABG,  Afib s/p PPM , HTN, ? Prostate CA  admitted with septic shock 2/2 HAP s/p pressors in CCU now in floors.    Subjective: Pt reports no events overnight-- no episodes of fevers or chills. Today pt endorses residual weakness however breathing is ok and he is less fatigued. No ha, dizziness, SOB, chest pain, n/v. No other symptoms or complaints reported. ROS as stated above.    Vital Signs Last 24 Hrs  T(C): 36.7 (24 Feb 2022 14:40), Max: 37.2 (23 Feb 2022 22:45)  T(F): 98.1 (24 Feb 2022 14:40), Max: 98.9 (23 Feb 2022 22:45)  HR: 73 (24 Feb 2022 21:25) (69 - 73)  BP: 100/50 (24 Feb 2022 21:25) (100/50 - 103/58)  BP(mean): --  RR: 18 (24 Feb 2022 21:25) (16 - 18)  SpO2: 96% (24 Feb 2022 21:25) (95% - 99%)    Physical Exam  GENERAL: NAD, Lying in the bed comfortably  HEAD:  Atraumatic, Normocephalic  EYES: EOMI, PERRLA, conjunctiva and sclera clear  ENT: Moist mucous membranes  NECK: Supple, No JVD  CHEST/LUNG: Improved. lung sounds clear b/l with unlabored respirations.   HEART: RRR.  No murmurs, rubs, or gallops  ABDOMEN: Bowel sounds present; Soft, Nontender, Nondistended. No hepatomegaly  EXTREMITIES:  2+ Peripheral Pulses  NERVOUS SYSTEM:  Alert & Oriented X3, speech clear. No deficits   MSK: FROM all 4 extremities, full and equal strength  SKIN: Ecchymosis noted on the upper extremities bilaterally      MEDICATIONS:  MEDICATIONS  (STANDING):  apixaban 5 milliGRAM(s) Oral every 12 hours  atorvastatin 40 milliGRAM(s) Oral at bedtime  budesonide 160 MICROgram(s)/formoterol 4.5 MICROgram(s) Inhaler 2 Puff(s) Inhalation two times a day  digoxin     Tablet 250 MICROGram(s) Oral daily  doxycycline hyclate Capsule 100 milliGRAM(s) Oral every 12 hours  furosemide    Tablet 40 milliGRAM(s) Oral daily  metoprolol tartrate 25 milliGRAM(s) Oral two times a day  pantoprazole    Tablet 40 milliGRAM(s) Oral two times a day  potassium chloride    Tablet ER 10 milliEquivalent(s) Oral daily  tiotropium 18 MICROgram(s) Capsule 1 Capsule(s) Inhalation daily    MEDICATIONS  (PRN):  acetaminophen     Tablet .. 650 milliGRAM(s) Oral every 6 hours PRN Temp greater or equal to 38.5C (101.3F), Moderate Pain (4 - 6)      LABS: All Labs Reviewed:                        8.5    5.49  )-----------( 367      ( 24 Feb 2022 08:47 )             25.9     02-24    138  |  103  |  10  ----------------------------<  82  3.7   |  29  |  0.64    Ca    8.1<L>      24 Feb 2022 08:47  Phos  2.5     02-24  Mg     2.2     02-24            Blood Culture: 02-23 @ 08:50  Organism --  Gram Stain Blood -- Gram Stain --  Specimen Source Clean Catch Clean Catch (Midstream)  Culture-Blood --    02-22 @ 11:27  Organism --  Gram Stain Blood -- Gram Stain --  Specimen Source .Blood None  Culture-Blood --    02-21 @ 18:43  Organism --  Gram Stain Blood -- Gram Stain --  Specimen Source .Blood None  Culture-Blood --      I&O's Summary    23 Feb 2022 07:01  -  24 Feb 2022 07:00  --------------------------------------------------------  IN: 0 mL / OUT: 625 mL / NET: -625 mL      CAPILLARY BLOOD GLUCOSE      EKG/RADIOLOGY  < from: Xray Chest 1 View-PORTABLE IMMEDIATE (Xray Chest 1 View-PORTABLE IMMEDIATE .) (02.19.22 @ 06:33) >  Findings/  Impression: Status post CABG. status post pacemaker. No lung   consolidations. Trace right pleural effusion.    --- End of Report ---    < end of copied text >  < from: CT Angio Chest PE Protocol w/ IV Cont (02.13.22 @ 23:16) >  IMPRESSION:    No pulmonary embolism.    Mosaic attenuation pattern to the bilateral upper lobes which may be   related to edema or air trapping or small airways disease. Patchy   opacities in the left lower lobe and to a lesser extent in the right   lower lobe which may represent infection. Correlate clinically. Follow   these findings to resolution to exclude other processes.    < from: Xray Chest 1 View- PORTABLE-Urgent (Xray Chest 1 View- PORTABLE-Urgent .) (02.13.22 @ 22:49) >  IMPRESSION:   No radiographic evidence of active chest disease.    < from: Xray Chest 1 View- PORTABLE-Routine (Xray Chest 1 View- PORTABLE-Routine .) (02.24.22 @ 10:01) >  INTERPRETATION:  Chest one view    HISTORY: Dyspnea    COMPARISON STUDY: 2/22/2022    Frontal expiratory view of the chest shows the heart to be normal in   size. Sternal wires and left cardiac pacemaker are again noted.    The lungs show partial clearing of the lungs with similar small right   effusion and there is no evidence of pneumothorax nor left pleural   effusion.    IMPRESSION:  Resolving infiltrates.    < end of copied text >

## 2022-02-24 NOTE — PROGRESS NOTE ADULT - ASSESSMENT
This is an 86 year old male with history of NSCLC ADENOCARINOMA on Pemetrexed MONOTHERAPY now admitted for PNA with recurrent fevers after course of antibiotics    NSCLC   - now on active treatment with Dr. Frausto   - patient on PEMETREXED   - PATIENT HAS NOT RECEIVED IMMUNOTHERAPY SINCE beginning of JANUARY, Doubt I/O as source of recurrent fevers  - patient likely cytopenic from recent chemotherapy administration - w/ pemetrexed    Sepsis secondary to PNA   - CEFEPIME has been discontinued- pt still spiking fevers yesterday am- none since 8 am on 2/22  - DOXYCYCLINE NOW   - now afebrile   ANEMIA   - possibly secondary to antineoplastic therapy vs GIB- pt has not received pRBC transfusion during therapy before  - seen by GI no plan for endoscopy FOBT negative- c/w protonix  - Hb now 8.5 --> stable   would continue to follow

## 2022-02-24 NOTE — PROGRESS NOTE ADULT - ASSESSMENT
85 y/o male with a PMHx of polycythemia vera, prostate CA, rheumatic fever, CAD s/p CABG,  Afib s/p PPM , HTN, ? Prostate CA c/o generalized weakness, and  SOB. He is evaluated for:    # Severe sepsis and acute respiratory failure (Improved)  #Febrile Episodes (Improved)  - Improved; non-dyspneic, non-labored breathing. CXR 2/24 shows improved infiltrates   - PT was spiking fevers however is currently afebrile possibly 2/2 residual infection vs malignancy vs antineoplastic therapy  - WBC WNL, BCX Urine Cx negative, Lactate now WNL  - S/p 7 day course of cefepime  - ID recs:  C/w doxycycline 100 mg po q 12 hours for 5 days, now day  - C/w lasiks 40 mg qD   - Heme onc following  - Pulmonology following  - Cardiology following    # Chronic CHF  -stable, no sxs of overt CHF  -BNP 5332->4288  -TTE: EF 55-60%, mod LA dilation  -C/w lasiks 40 mg qD  - Continue to monitor  - Cardiology following     #Elevated troponin  - Type 2 MI  -Probably 2/2 to demand ischemia     #Acute Blood Loss Macrocytic anemia   -B12 mildly elevated, folate wnl   -Possibly 2/2 antineoplastics vs GIB  -s/p 1 U PRBC 2/17 after report of bloody stool, Hgb drop to 6.8  -GI recs: PPI BID x 8 weeks, no EGD now; possible EGD later as outpatient   -Hgb 10>8.3>8.8>>8.5 (12/24) No further GIB episodes since eliquis was restarted.  -Stop Eliquis if GI bleeding occurs again. If HgB less than 7, will transfuse      # Hyponatremia  -mild, received IVF, will monitor     #Cad, s/p CABG   - recent cath negative  - C/w beta blocker  - Cardiology following    #Chronic Afib   -AV pacing on monitor   -cont Digoxin, level 1.42 on 2/19. Continue to monitor  -cont  lopressor 25 mg BID   -Pt is not taking amiodarone due to toxicity  -[id number]: 5  - Cardiology following    #DVT prophylaxis   -Eliquis     #GOC  -DNR/DNI  -MOLST form signed and in chart     Case discussed with Dr. Bran 85 y/o male with a PMHx of polycythemia vera, prostate CA, rheumatic fever, CAD s/p CABG,  Afib s/p PPM , HTN, ? Prostate CA c/o generalized weakness, and  SOB. He is evaluated for:    # Severe sepsis and acute respiratory failure (Improved)  #Febrile Episodes (Improved)  - Improved; non-dyspneic, non-labored breathing. CXR 2/24 shows improved infiltrates   - PT was spiking fevers however is currently afebrile possibly 2/2 residual infection vs malignancy vs antineoplastic therapy  - WBC WNL, BCX Urine Cx negative, Lactate now WNL  - S/p 7 day course of cefepime  - ID recs:  C/w doxycycline 100 mg po q 12 hours for 5 days, now day 2  - C/w lasiks 40 mg qD   - Heme onc following  - Pulmonology following  - Cardiology following    # Chronic CHF  -stable, no sxs of overt CHF  -BNP 5332->4288  -TTE: EF 55-60%, mod LA dilation  -C/w lasiks 40 mg qD  - Continue to monitor  - Cardiology following     #Elevated troponin  - Type 2 MI  -Probably 2/2 to demand ischemia     #Acute Blood Loss Macrocytic anemia   -B12 mildly elevated, folate wnl   -Possibly 2/2 antineoplastics vs GIB  -s/p 1 U PRBC 2/17 after report of bloody stool, Hgb drop to 6.8  -GI recs: PPI BID x 8 weeks, no EGD now; possible EGD later as outpatient   -Hgb 10>8.3>8.8>>8.5 (12/24) No further GIB episodes since eliquis was restarted.  -Stop Eliquis if GI bleeding occurs again. If HgB less than 7, will transfuse      # Hyponatremia  -mild, received IVF, will monitor     #Cad, s/p CABG   - recent cath negative  - C/w beta blocker  - Cardiology following    #Chronic Afib   -AV pacing on monitor   -cont Digoxin, level 1.42 on 2/19. Continue to monitor  -cont  lopressor 25 mg BID   -Pt is not taking amiodarone due to toxicity  -[id number]: 5  - Cardiology following    #DVT prophylaxis   -Eliquis     #GOC  -DNR/DNI  -MOLST form signed and in chart     Case discussed with Dr. Bran

## 2022-02-24 NOTE — PROGRESS NOTE ADULT - ATTENDING COMMENTS
85 y/o male with a PMHx of polycythemia vera, prostate CA, rheumatic fever, CAD s/p CABG,  Afib s/p PPM , HTN, ? Prostate CA c/o generalized weakness, and  SOB. He is evaluated for:    # Severe sepsis and acute respiratory failure (Improved)  #Febrile Episodes (Improved)  - Improved; non-dyspneic, non-labored breathing. CXR 2/24 shows improved infiltrates   - PT was spiking fevers however is currently afebrile possibly 2/2 residual infection vs malignancy vs antineoplastic therapy  - WBC WNL, BCX Urine Cx negative, Lactate now WNL  - S/p 7 day course of cefepime  - ID recs:  C/w doxycycline 100 mg po q 12 hours for 5 days, now day  - C/w lasiks 40 mg qD   - Heme onc following  - Pulmonology following  - Cardiology following 85 y/o male with a PMHx of polycythemia vera, prostate CA, rheumatic fever, CAD s/p CABG,  Afib s/p PPM , HTN, ? Prostate CA c/o generalized weakness, and  SOB. He is evaluated for:    # Severe sepsis and acute respiratory failure (Improved)  #Febrile Episodes (Improved)  - Improved; non-dyspneic, non-labored breathing. CXR 2/24 shows improved infiltrates   - PT was spiking fevers however is currently afebrile possibly 2/2 residual infection vs malignancy vs antineoplastic therapy  - WBC WNL, BCX Urine Cx negative, Lactate now WNL  - S/p 7 day course of cefepime  - ID recs:  C/w doxycycline 100 mg po q 12 hours for 5 days, now day 2  - C/w lasiks 40 mg qD   - Heme onc following  - Pulmonology following  - Cardiology following

## 2022-02-24 NOTE — PROGRESS NOTE ADULT - SUBJECTIVE AND OBJECTIVE BOX
INTERVAL HPI/OVERNIGHT EVENTS:  Patient S&E at bedside. No o/n events, patient resting comfortably.     VITAL SIGNS:  Vital Signs Last 24 Hrs  T(C): 36.3 (2022 07:29), Max: 37.2 (2022 22:45)  T(F): 97.3 (2022 07:29), Max: 98.9 (2022 22:45)  HR: 72 (2022 07:29) (69 - 72)  BP: 102/53 (2022 07:29) (93/52 - 104/52)  BP(mean): --  RR: 18 (2022 07:29) (18 - 18)  SpO2: 95% (:29) (95% - 99%)-    PHYSICAL EXAM:    Constitutional: NAD  Eyes: EOMI, sclera non-icteric  Neck: supple, no masses, no JVD  Respiratory: CTA b/l, good air entry b/l  Cardiovascular: RRR, no M/R/G  Gastrointestinal: soft, NTND, no masses palpable, + BS, no hepatosplenomegaly  Extremities: no c/c/e  skin tear LUE   Neurological: AAOx3      MEDICATIONS  (STANDING):  apixaban 5 milliGRAM(s) Oral every 12 hours  atorvastatin 40 milliGRAM(s) Oral at bedtime  budesonide 160 MICROgram(s)/formoterol 4.5 MICROgram(s) Inhaler 2 Puff(s) Inhalation two times a day  digoxin     Tablet 250 MICROGram(s) Oral daily  doxycycline hyclate Capsule 100 milliGRAM(s) Oral every 12 hours  furosemide    Tablet 40 milliGRAM(s) Oral daily  metoprolol tartrate 25 milliGRAM(s) Oral two times a day  pantoprazole    Tablet 40 milliGRAM(s) Oral two times a day  potassium chloride    Tablet ER 10 milliEquivalent(s) Oral daily  tiotropium 18 MICROgram(s) Capsule 1 Capsule(s) Inhalation daily    MEDICATIONS  (PRN):  acetaminophen     Tablet .. 650 milliGRAM(s) Oral every 6 hours PRN Temp greater or equal to 38.5C (101.3F), Moderate Pain (4 - 6)      Allergies    No Known Allergies    Intolerances        LABS:                        8.5    5.49  )-----------( 367      ( 2022 08:47 )             25.9     -    136  |  101  |  10  ----------------------------<  93  3.9   |  28  |  0.81    Ca    8.2<L>      2022 07:08  Phos  2.3       Mg     2.1             Urinalysis Basic - ( 2022 08:50 )    Color: Yellow / Appearance: Clear / S.010 / pH: x  Gluc: x / Ketone: Negative  / Bili: Negative / Urobili: 4   Blood: x / Protein: 30 mg/dL / Nitrite: Negative   Leuk Esterase: Trace / RBC: Negative /HPF / WBC 0-2   Sq Epi: x / Non Sq Epi: Negative / Bacteria: Negative        RADIOLOGY & ADDITIONAL TESTS:  Studies reviewed.    ASSESSMENT & PLAN:

## 2022-02-24 NOTE — PROGRESS NOTE ADULT - ASSESSMENT
1) Dyspnea  2) Adenocarcinoma (unclear full staging)  3) Abnormal CT Chest  4) COPD    85 y/o male with a PMHx of polycythemia vera, prostate CA, rheumatic fever, CAD s/p CABG,  Afib s/p PPM , HTN, ? Prostate CA  presents to the ED c/o generalized weakness, and  SOB. The pt has been feeling extreme weakness and SOB associated with fever for the past four days; hence, his wife advised him to visit [facility] for further evaluation.  Received Keytruda in the past for lung cancer  Per chart review from [facility], patient was seen by Dr Chow 12/2021  History of lung cancer, s/p right upper wedge resection 8/2016 with Dr Solorio.  Noted to have Adenocarcinoma that was 1.2cm  Treated at [facility] with chemotherapy and immunotherapy.  Developed rash after chemotherapy and it was stopped.  History of PAF as well, sees Dr eJtt, treated with amiodarone as well.  PFT revealed mild obstructive ventilatory defect. no air trapping hyperinflation, DLCO moderately reduced.  CT Chest at [facility] 1/2022 revealed that in the left lung there is a groundglass density with a centrally dilated bronchus in the LLL  measuring 1.8cm in greatest dimension and in the medial aspect of the left lower lobe.   In the medial aspect of the RUL there is a spiculated nodular density with the central areas of aeration that measures 2.6cm by 1.3cm (neoplastic finding cannot be excluded)  Thereafter underwent a new CT Chest this admission that revealed Patent central airways.  Right upper lobe postsurgical changes. Mosaic attenuation pattern to the bilateral upper lobes which may be related to edema or air trapping or small airways disease. Patchy opacities in the left lower lobe and to a lesser extent in the right lower lobe which may represent infection.   CT Chest revealed Patient evaluated this morning, still noted to have febrile episodes overnight   CT Chest performed revealed Increased lung findings since 2/13/2022, favored to represent pulmonary edema.  Increased small pleural effusions. Stable subcentimeter nodules in the right lower lobe.   In terms of etiology of the mosaic attenuation/air trapping/vascular disease on CT Chest, the thought process is that it is likely small airway disease   Appreciate ID recommendations  Patient states that the Symbicort 2 puffs BID and Spiriva have been helping  May need CT Abdomen if he continues to have febrile episodes. It is also possible for fevers to occur two months after the last dose of immunotherapy so if the fevers do in fact persist, will consider starting Steroids   Will discuss further with hospitalist service. Discussed with ID  2/24  Covering for Dr Montemayor  pt asks appropriate questions, all answered  feels weak but not dyspneic  no significant cough or sputum  breathing is not labored by speaking full sentences  will need rehab  also needs Home o2 eval, discussed  cxr repeat requested

## 2022-02-24 NOTE — PROGRESS NOTE ADULT - SUBJECTIVE AND OBJECTIVE BOX
Date of service: 02-24-22 @ 14:40      Patient lying in bed; afebrile greater than 24 hours, however doxycycline was started late yesterday      ROS unable to obtain secondary to patient medical condition     MEDICATIONS  (STANDING):  apixaban 5 milliGRAM(s) Oral every 12 hours  atorvastatin 40 milliGRAM(s) Oral at bedtime  budesonide 160 MICROgram(s)/formoterol 4.5 MICROgram(s) Inhaler 2 Puff(s) Inhalation two times a day  digoxin     Tablet 250 MICROGram(s) Oral daily  doxycycline hyclate Capsule 100 milliGRAM(s) Oral every 12 hours  furosemide    Tablet 40 milliGRAM(s) Oral daily  metoprolol tartrate 25 milliGRAM(s) Oral two times a day  pantoprazole    Tablet 40 milliGRAM(s) Oral two times a day  potassium chloride    Tablet ER 10 milliEquivalent(s) Oral daily  tiotropium 18 MICROgram(s) Capsule 1 Capsule(s) Inhalation daily    MEDICATIONS  (PRN):  acetaminophen     Tablet .. 650 milliGRAM(s) Oral every 6 hours PRN Temp greater or equal to 38.5C (101.3F), Moderate Pain (4 - 6)      Vital Signs Last 24 Hrs  T(C): 36.3 (24 Feb 2022 07:29), Max: 37.2 (23 Feb 2022 22:45)  T(F): 97.3 (24 Feb 2022 07:29), Max: 98.9 (23 Feb 2022 22:45)  HR: 72 (24 Feb 2022 07:29) (69 - 72)  BP: 102/53 (24 Feb 2022 07:29) (93/52 - 104/52)  BP(mean): --  RR: 18 (24 Feb 2022 07:29) (18 - 18)  SpO2: 95% (24 Feb 2022 07:29) (95% - 99%)        Physical Exam:          Constitutional: frail looking  HEENT: NC/AT, EOMI, PERRLA, conjunctivae clear; ears and nose atraumatic; pharynx clear  Neck: supple; thyroid not palpable  Back: no tenderness  Respiratory: respiratory effort normal; clear to auscultation bilaterally  Cardiovascular: S1S2 regular, no murmurs  Abdomen: soft, not tender, not distended, positive BS; no liver or spleen organomegaly  Genitourinary: no suprapubic tenderness  Musculoskeletal: no muscle tenderness, no joint swelling or tenderness  Neurological/ Psychiatric:  moving all extremities  Skin: no rashes; no palpable lesions; multiple ecchymosis    Labs: all available labs reviewed                         Labs:           Labs:                     Labs:                        Labs:                        8.5    5.49  )-----------( 367      ( 24 Feb 2022 08:47 )             25.9     02-24    138  |  103  |  10  ----------------------------<  82  3.7   |  29  |  0.64    Ca    8.1<L>      24 Feb 2022 08:47  Phos  2.5     02-24  Mg     2.2     02-24             Cultures:       Culture - Urine (collected 02-23-22 @ 08:50)  Source: Clean Catch Clean Catch (Midstream)  Final Report (02-24-22 @ 08:40):    No growth    Culture - Blood (collected 02-22-22 @ 11:27)  Source: .Blood None  Preliminary Report (02-23-22 @ 16:01):    No growth to date.    Culture - Blood (collected 02-21-22 @ 18:43)  Source: .Blood None  Preliminary Report (02-22-22 @ 23:01):    No growth to date.    Culture - Blood (collected 02-21-22 @ 18:43)  Source: .Blood None  Preliminary Report (02-22-22 @ 23:01):    No growth to date.    Culture - Urine (collected 02-19-22 @ 08:14)  Source: Clean Catch Clean Catch (Midstream)  Final Report (02-20-22 @ 14:19):    No growth    Culture - Blood (collected 02-19-22 @ 06:41)  Source: .Blood None  Final Report (02-24-22 @ 09:00):    No Growth Final    Culture - Blood (collected 02-19-22 @ 06:41)  Source: .Blood None  Final Report (02-24-22 @ 09:00):    No Growth Final      Ferritin, Serum: 1202 ng/mL (02-21-22 @ 11:34)        < from: Xray Chest 1 View-PORTABLE IMMEDIATE (Xray Chest 1 View-PORTABLE IMMEDIATE .) (02.19.22 @ 06:33) >  ACC: [id number] EXAM:  XR CHEST PORTABLE IMMED 1V                          PROCEDURE DATE:  02/19/2022          INTERPRETATION:  Clinical Information: Dyspnea    Technique: AP chest x-ray(s).    Comparison: None    Findings/  Impression: Status post CABG. status post pacemaker. No lung   consolidations. Trace right pleural effusion.    < end of copied text >                < from: CT Angio Chest PE Protocol w/ IV Cont (02.13.22 @ 23:16) >    ACC: [id number] EXAM:  CT ANGIO CHEST PULM ART [facility]                          PROCEDURE DATE:  02/13/2022          INTERPRETATION:  CLINICAL INFORMATION: Lung cancer, hypoxia, evaluate for   pulmonary embolism    COMPARISON: Chest x-ray 2/13/2022. CTabdomen pelvis 9/12/2014    CONTRAST/COMPLICATIONS:  IV Contrast: Omnipaque 350  90 cc administered   10 cc discarded  Oral Contrast: NONE  Complications: None reported at time of study completion    PROCEDURE:  CT Angiography of the Chest.  [place] coronal reformats were performed as well as 3D (MIP)   reconstructions.    FINDINGS:    LUNGS AND AIRWAYS: Patent central airways.  Right upper lobe postsurgical   changes. Mosaic attenuation pattern to the bilateral upper lobes which   may be related to edema or air trapping or small airways disease. Patchy   opacities in the left lower lobe and to a lesser extent in the right   lower lobe which may represent infection. Correlate clinically. Follow   these findings to resolution to exclude other processes.  PLEURA: Trace bilateral pleural fluid.  MEDIASTINUM AND KATELYN: No lymphadenopathy.  VESSELS: No pulmonary embolism.  HEART: Cardiomegaly. Coronary artery calcifications. No pericardial   effusion.  CHEST WALL AND LOWER NECK: Within normal limits.  VISUALIZED UPPER ABDOMEN: Within normal limits.  BONES: Sternotomy. Degenerative changes.    IMPRESSION:    No pulmonary embolism.    Mosaic attenuation pattern to the bilateral upper lobes which may be   related to edema or air trapping or small airways disease. Patchy   opacities in the left lower lobe and to a lesser extent in the right   lower lobe which may represent infection. Correlate clinically. Follow   these findings to resolution to exclude other processes.    < end of copied text >        Radiology: all available radiological tests reviewed    Advanced directives addressed: full resuscitation

## 2022-02-24 NOTE — PROGRESS NOTE ADULT - ASSESSMENT
87 y/o male with a PMHx of polycythemia vera, prostate CA, rheumatic fever, CAD s/p CABG,  Afib s/p PPM , HTN, admitted on 2/14  c/o generalized weakness, and  SOB. The pt has been feeling extreme weakness and SOB associated with fever for the past four days; associated with inability to stand, walk. The patient was recently on chemotherapy for lung cancer and about 6 months ago had a GI bleed. Patient is poor historian and history per medical record. Is requiring small doses of pressors since admission.     1. Patient admitted with pneumonia, patient at risk for gram negative rods and other resistant bacteria and is on pressor support  - follow up cultures   - serial cbc and monitor temperature   - iv hydration and supportive care   - oxygen and nebs as needed   - reviewed prior medical records to evaluate for resistant or atypical pathogens   - completed 7 days cefepime, in lieu of this the patient has intermittent fever  - will start doxycycline 100 mg po q 12 hours for 5 days; this antibiotic has antiinflammatory properties; no obvious striking infection apparent, day #2  - tolerating antibiotics without rashes or side effects   - spoke with family practice resident to investigate his lack of appetite and mild mental status changes, possibly he is depressed?  - fever may be secondary to immunotherapy versus gastritis and/or GI bleed  2. other issues: per medicine

## 2022-02-24 NOTE — PROGRESS NOTE ADULT - SUBJECTIVE AND OBJECTIVE BOX
Patient is a 86y old  Male who presents with a chief complaint of acute hypoxic respiratory failure  PNA (19 Feb 2022 13:52)      HPI:  87 y/o male with a PMHx of polycythemia vera, prostate CA, rheumatic fever, CAD s/p CABG,  Afib s/p PPM , HTN, ? Prostate CA  presents to the ED c/o generalized weakness, and  SOB. The pt has been feeling extreme weakness and SOB associated with fever for the past four days; hence, his wife advised him to visit [facility] for further evaluation.  Received Keytruda in the past for lung cancer  Per chart review from [facility], patient was seen by Dr Chow 12/2021  History of lung cancer, s/p right upper wedge resection 8/2016 with Dr Solorio.  Noted to have Adenocarcinoma that was 1.2cm  Treated at [facility] with chemotherapy and immunotherapy.  Developed rash after chemotherapy and it was stopped.  History of PAF as well, sees Dr Jett, treated with amiodarone as well.  PFT revealed mild obstructive ventilatory defect. no air trapping hyperinflation, DLCO moderately reduced.  CT Chest at [facility] [place] 1/2022 revealed that in the left lung there is a groundglass density with a centrally dilated bronchus in the LLL  measuring 1.8cm in greatest dimension and in the medial aspect of the left lower lobe.   In the medial aspect of the RUL there is a spiculated nodular density with the central areas of aeration that measures 2.6cm by 1.3cm (neoplastic finding cannot be excluded)  Thereafter underwent a new CT Chest this admission that revealed Patent central airways.  Right upper lobe postsurgical   changes. Mosaic attenuation pattern to the bilateral upper lobes which may be related to edema or air trapping or small airways disease. Patchy   opacities in the left lower lobe and to a lesser extent in the right lower lobe which may represent infection.   Noted to be febrile overnight    2/23  Patient evaluated this morning, still noted to have febrile episodes overnight but Tm is reducing  CT Chest performed revealed Increased lung findings since 2/13/2022, favored to represent pulmonary edema.  Increased small pleural effusions. Stable subcentimeter nodules in the right lower lobe.   2/24  asks appropriate questions  feels weak but not dyspneic  no significant cough or sputum  breathing is not labored by speaking full sentences    PAST MEDICAL & SURGICAL HISTORY:  Atrial fibrillation    Hypertension    CAD (coronary artery disease)  stent in 2007    Pacemaker    Rheumatic fever  child traylor    Prostate ca  radiation treatment 5yrs ago    Symptomatic bradycardia    Polycythemia vera    Artificial cardiac pacemaker  2006    History of PTCA  with stent to RCA 2007      MEDICATIONS  (STANDING):  apixaban 5 milliGRAM(s) Oral every 12 hours  atorvastatin 40 milliGRAM(s) Oral at bedtime  budesonide 160 MICROgram(s)/formoterol 4.5 MICROgram(s) Inhaler 2 Puff(s) Inhalation two times a day  digoxin     Tablet 250 MICROGram(s) Oral daily  doxycycline hyclate Capsule 100 milliGRAM(s) Oral every 12 hours  furosemide    Tablet 40 milliGRAM(s) Oral daily  metoprolol tartrate 25 milliGRAM(s) Oral two times a day  pantoprazole    Tablet 40 milliGRAM(s) Oral two times a day  potassium chloride    Tablet ER 10 milliEquivalent(s) Oral daily  tiotropium 18 MICROgram(s) Capsule 1 Capsule(s) Inhalation daily    MEDICATIONS  (PRN):  acetaminophen     Tablet .. 650 milliGRAM(s) Oral every 6 hours PRN Temp greater or equal to 38.5C (101.3F), Moderate Pain (4 - 6)        FAMILY HISTORY:  Family history of bronchiectasis (Mother)        Vital Signs Last 24 Hrs  T(C): 37.2 (23 Feb 2022 22:45), Max: 37.2 (23 Feb 2022 22:45)  T(F): 98.9 (23 Feb 2022 22:45), Max: 98.9 (23 Feb 2022 22:45)  HR: 70 (23 Feb 2022 22:45) (69 - 71)  BP: 100/58 (23 Feb 2022 22:45) (93/52 - 106/51)  BP(mean): --  RR: 18 (23 Feb 2022 22:45) (18 - 18)  SpO2: 99% (23 Feb 2022 22:45) (95% - 99%)    I&O's Detail    23 Feb 2022 07:01  -  24 Feb 2022 07:00  --------------------------------------------------------  IN:  Total IN: 0 mL    OUT:    Voided (mL): 625 mL  Total OUT: 625 mL    Total NET: -625 mL          PHYSICAL EXAM  General Appearance: cooperative, no acute distress,   HEENT: PERRL, conjunctiva clear, EOM's intact, non injected pharynx, no exudate, TM   normal  Neck: Supple, , no adenopathy, thyroid: not enlarged, no carotid bruit or JVD  Back: Symmetric, no  tenderness,no soft tissue tenderness  Lungs: coarse bilaterally   Heart: Regular rate and rhythm, S1, S2 normal, no murmur, rub or gallop  Abdomen: Soft, non-tender, bowel sounds active , no hepatosplenomegaly  Extremities: no cyanosis or edema, no joint swelling  Skin: Skin color, texture normal, no rashes   Neurologic: Alert and oriented X3 , cranial nerves intact, sensory and motor normal,    ECG:    LABS:                          8.3    4.14  )-----------( 256      ( 20 Feb 2022 07:19 )             25.6     02-19    134<L>  |  102  |  15  ----------------------------<  96  4.8   |  25  |  0.90    Ca    8.6      19 Feb 2022 06:41  Phos  2.6     02-19  Mg     2.3     02-19    TPro  6.3  /  Alb  2.3<L>  /  TBili  1.4<H>  /  DBili  x   /  AST  50<H>  /  ALT  61  /  AlkPhos  74  02-19          Pro BNP  [id number] 02-15 @ 07:08  D Dimer  -- 02-15 @ 07:08  Pro BNP  [id number] 02-13 @ 21:55  D Dimer  -- 02-13 @ 21:55              RADIOLOGY & ADDITIONAL STUDIES:  < from: CT Chest No Cont (02.22.22 @ 09:49) >  IMPRESSION:    Increased lung findings since 2/13/2022, favored to represent pulmonary   edema.    Increased small pleural effusions.    Stable subcentimeter nodules in the right lower lobe. 3 month follow-up   is recommended.    < end of copied text >

## 2022-02-24 NOTE — PROGRESS NOTE ADULT - ASSESSMENT
2/15/22: Pt with above history and lung CA on chemoRx with SOB but likely not of cardiac origin.  Being treated for PNA in the face of underlying lung CA but clinically appears better.  His AF is rate controlled with an underlying PPM for bradycardia protection.  I would continue his current meds including the Digoxin as it does not appear to be near toxic range.  Continue as outlined by medicine and ID etal.  Will follow as work-up and treatment progresses.    2/16/22:  Less SOB.  No anginal chest pains or other cardiac changes.  A-V pacing on the monitor.  Continue as outlined by medicine and ID etal.  Will follow as work-up and treatment progresses.    2/17/22:  Slowly improving.  Remains afebrile.  H&H low but no obvious bleeding.  A-v pacing on the monitor.  No new cardiac symptoms.  Continue as outlined by medicine and ID etal.  Will continue to follow as treatment progresses.    2/18/22:  Feeling good.  A-V pacing on the monitor. No new cardiac symptoms.  Was scheduled for chemoRx at [place] next week but this was cancelled due to his PNA.  Continue as outlined by medicine and ID etal.  I will be away this weekend.  Dr Quintero will be covering me if needed.    2/21/22;  Feeling better with less SOB.  A-V pacing on the monitor with occasional AF with controlled VR.  No new cardiac symptoms.  H/H 8.3 twice yesterday.  Continue as outlined by medicine and ID etal.  Will continue to follow intermittently prn.    2/22/22:  Continue to feel good.  Hgb up to 8.8 yesterday.  Maintaining A-V pacing on the monitor.  No new cardiac issues so far.  Eager to go home.  Continue as outlined by medicine, oncology and ID etal.  Will continue to follow intermittently prn.  Follow up at [place] an needed.    2/23/22:  Still with low grade temps to 100.8 & 100.1 but now 99.6.  Maintaining A-V pacing on the monitor.  Hgb=9.1 yesterday and continues to improve. CT of Chest as below.  No new cardiac issues so far.  Continue as outlined by medicine, pulmonary, oncology and ID etal.  Will continue to follow intermittently prn.    2/24/22:  Feels better with less SOB.  CT of chest suggest possible mild pulmonary edema.  May benefit from Lasix prn.  Tmax yesterday 99.9 in the AM and trending down since. VVS otherwise.  For possible discharge soon.  No new cardiac changes otherwise.  Continue as outlined by medicine, pulmonary, oncology and ID etal.  Will continue to follow intermittently prn and as an outpt as needed.  He also sees Dr Jett at [facility] for cardiology.

## 2022-02-24 NOTE — PROGRESS NOTE ADULT - SUBJECTIVE AND OBJECTIVE BOX
CHIEF COMPLAINT:  Patient is a 86y old  Male who presents with a chief complaint of acute hypoxic respiratory failure  PNA (15 Feb 2022 16:25)      HPI: 2/15/22:  87 y/o male known to me with a PMHx of polycythemia vera, prostate CA, rheumatic fever, CAD s/p CABG,  chronic Afib s/p PPM , HTN, ? Prostate CA  presents to the ED c/o generalized weakness, and  SOB. The pt has been feeling extreme weakness and SOB associated with fever for the past four days; hence, his wife advised him to visit [facility] for further evaluation. He claims that his weakness was so severe that he could not get up from his chair or walk properly. In addition, his PO intake decreased drastically . Pt denied chest pain, nausea, vomiting, loc, history of fall, diarrhea, and [place] bleeding.   Pt received chemotherapy 3 To 4 weeks back for his lung cancer, and subsequent scheduled chemotherapy is in one week. Then, last October, he was admitted to [facility] for GI bleeding.   Currently he denies anginal chest pains, palpitations, dizziness or syncope.  He had an echo today showing normal LV systolic function and LVEF approx. 55-60% with moderately dilated LA and mild MR, TR, AI and PI.  He has no new cardiac symptoms otherwise.    22:  Less SOB.  No anginal chest pains or other cardiac changes.  A-V pacing on the monitor.    22:  Slowly improving.  Remains afebrile.  H&H low but no obvious bleeding.  A-v pacing on the monitor.  No new cardiac symptoms.    22:  Feeling good.  A-V pacing on the monitor. No new cardiac symptoms.  Was scheduled for chemoRx at [place] next week but this was cancelled due to his PNA.    22;  Feeling better with less SOB.  A-V pacing on the monitor with occasional AF with controlled VR.  No new cardiac symptoms.  H/H 8.3 twice yesterday.    22:  Continue to feel good.  Hgb up to 8.8 yesterday.  Maintaining A-V pacing on the monitor.  No new cardiac issues so far.  Eager to go home.    22:  Still with low grade temps to 100.8 & 100.1 but now 99.6.  Maintaining A-V pacing on the monitor.  Hgb=9.1 yesterday and continues to improve. CT of Chest as below.  No new cardiac issues so far.    22:  Feels better with less SOB.  CT of chest suggest possible mild pulmonary edema.  May benefit from Lasix prn.  Tmax yesterday 99.9 in the AM and trending down since. VVS otherwise.  For possible discharge soon.  No new cardiac changes otherwise.        PMHx:  PAST MEDICAL & SURGICAL HISTORY:  Atrial fibrillation  Hypertension  CAD (coronary artery disease)-stent in   CABG  Pacemaker  Rheumatic fever-child traylor  Prostate ca-radiation treatment 5yrs ago  Symptomatic bradycardia  Polycythemia vera  Artificial cardiac pacemaker-  History of PTCA-with stent to RCA       FAMILY HISTORY:   FAMILY HISTORY:-  Family history of bronchiectasis (Mother)      ALLERGIES:  Allergies  No Known Allergies      REVIEW OF SYSTEMS:  10 point ROS was obtained  Pertinent positives and negatives are as above  All other review of systems is negative unless indicated above      Vital Signs Last 24 Hrs  T(C): 37.2 (2022 22:45), Max: 37.2 (2022 22:45)  T(F): 98.9 (2022 22:45), Max: 98.9 (2022 22:45)  HR: 70 (2022 22:45) (69 - 71)  BP: 100/58 (2022 22:45) (93/52 - 106/51)  BP(mean): --  RR: 18 (2022 22:45) (18 - 18)  SpO2: 99% (2022 22:45) (95% - 99%)      I&O's Summary    2022 07:01  -  15 Feb 2022 07:00  --------------------------------------------------------  IN: 829 mL / OUT: 1975 mL / NET: -1146 mL    15 Feb 2022 07:01  -  15 Feb 2022 20:06  --------------------------------------------------------  IN: 60.7 mL / OUT: 0 mL / NET: 60.7 mL        PHYSICAL EXAM:   Constitutional: NAD, awake and alert, well-developed  HEENT: PERR, EOMI, Normal Hearing, MMM  Neck: Soft and supple, No LAD, No JVD  Respiratory: Breath sounds are clear bilaterally, No wheezing, rales or rhonchi, PPM in left chest wall  Cardiovascular: S1 and S2, regular rate and regular rhythm, soft ANA M at LLSB and base as before, no gallops or rubs  Gastrointestinal: Bowel Sounds present, soft, nontender, nondistended, no guarding, no rebound  Extremities: No peripheral edema  Vascular: 2+ peripheral pulses  Neurological: A/O x 3, no focal deficits  Musculoskeletal: 5/5 strength b/l upper and lower extremities      MEDICATIONS  (STANDING):  apixaban 5 milliGRAM(s) Oral every 12 hours  atorvastatin 40 milliGRAM(s) Oral at bedtime  budesonide 160 MICROgram(s)/formoterol 4.5 MICROgram(s) Inhaler 2 Puff(s) Inhalation two times a day  digoxin     Tablet 250 MICROGram(s) Oral daily  doxycycline hyclate Capsule 100 milliGRAM(s) Oral every 12 hours  furosemide    Tablet 40 milliGRAM(s) Oral daily  metoprolol tartrate 25 milliGRAM(s) Oral two times a day  pantoprazole    Tablet 40 milliGRAM(s) Oral two times a day  potassium chloride    Tablet ER 10 milliEquivalent(s) Oral daily  tiotropium 18 MICROgram(s) Capsule 1 Capsule(s) Inhalation daily    MEDICATIONS  (PRN):  acetaminophen     Tablet .. 650 milliGRAM(s) Oral every 6 hours PRN Temp greater or equal to 38.5C (101.3F), Moderate Pain (4 - 6)      LABS: All Labs Reviewed:                        9.0    5.75  )-----------( 373      ( 2022 07:08 )             28.0                           9.1    5.13  )-----------( 344      ( 2022 07:59 )             28.8                           8.8    4.36  )-----------( 300      ( 2022 11:34 )             26.3                           8.3    x     )-----------( x        ( 2022 18:01 )             25.0                           6.8    6.15  )-----------( 148      ( 2022 06:23 )             20.8                           7.9    4.49  )-----------( 96       ( 15 Feb 2022 06:59 )             23.9       02-    136  |  101  |  10  ----------------------------<  93  3.9   |  28  |  0.81    Ca    8.2<L>      2022 07:08  Phos  2.3     02-23  Mg     2.1     -23      -22    135  |  103  |  10  ----------------------------<  95  4.1   |  27  |  0.72    Ca    8.0<L>      2022 07:59  Phos  2.0     02-22  Mg     2.1     -22        -21    136  |  102  |  14  ----------------------------<  132<H>  3.7   |  29  |  0.84    Ca    8.0<L>      2022 11:34      02-20    136  |  103  |  15  ----------------------------<  91  3.2<L>   |  27  |  0.72    Ca    7.9      2022 07:19        02-17    139  |  109<H>  |  24<H>  ----------------------------<  143<H>  4.3   |  25  |  1.02    Ca    8.5      2022 06:23      02-16    135  |  104  |  23  ----------------------------<  167<H>  3.8   |  23  |  0.88    Ca    8.2<L>      2022 06:51    TPro  5.5<L>  /  Alb  1.8<L>  /  TBili  1.0  /  DBili  0.4<H>  /  AST  35  /  ALT  33  /  AlkPhos  57  02-15      02-15    135  |  105  |  17  ----------------------------<  161<H>  3.6   |  22  |  0.73    Ca    8.0<L>      15 Feb 2022 06:59    TPro  5.5<L>  /  Alb  1.8<L>  /  TBili  1.0  /  DBili  0.4<H>  /  AST  35  /  ALT  33  /  AlkPhos  57  02-15    PT/INR - ( 15 Feb 2022 06:59 )   PT: 18.3 sec;   INR: 1.61 ratio       PTT - ( 2022 21:55 )  PTT:33.8 sec    Troponin I, High Sensitivity (22 @ 21:55): 91.23    Serum Pro-Brain Natriuretic Peptide: 4288 pg/mL (02-15 @ 07:08)  Serum Pro-Brain Natriuretic Peptide: 5332 pg/mL ( @ 21:55)    Digoxin Level, Serum (22 @ 06:41): 1.42 ng/mL   Digoxin Level, Serum (02.15.22 @ 06:59): .86 ng/mL     CULTURES:   22 & 22:  Organism --  Gram Stain Blood -- Gram Stain --  Specimen Source .Blood None  Culture-Blood --No growth to date.     22:  Organism --  Gram Stain Urine -- Gram Stain --  Specimen Source Clean Catch None  Culture-Urine --No growth       LIPID PROFILE     RADIOLOGY:   CXR: 22:  Frontal expiratory view of the chest shows the heart to be similar in size. Sternal wires and left cardiac pacemaker are again noted.  The lungs show progression of right upper lobe infiltrate and there is no evidence of pneumothorax nor pleural effusion.  IMPRESSION:  Right upper lobe infiltrate.    CT of Chest: 22:  FINDINGS:  LUNGS/AIRWAYS/PLEURA: Increased largely central ground glass and interlobular septal thickening in all lobes of the right lung and left upper lobe. Stable few subcentimeter nodules in the right lower lobe, for example, 4-198. Right upper lobe wedge resection. Patent trachea and bronchi. Increased small pleural effusions.  LYMPH NODES/MEDIASTINUM: No enlarged lymph nodes.  HEART/VASCULATURE: Normal heart size. Unremarkable pericardium. CABG. Mildly calcified aortic valve and aorta. Normal caliber aorta and main pulmonary artery. Appropriate course of dual-chamber pacemaker.  UPPER ABDOMEN: Cholelithiasis. Gastric fundal diverticulum.  BONES/SOFT TISSUES: Sternotomy. Degenerative changes of the spine.  IMPRESSION:  Increased lung findings since 2022, favored to represent pulmonary edema.  Increased small pleural effusions.  Stable subcentimeter nodules in the right lower lobe. 3 month follow-up is recommended.     CXR: 22:  Findings/  Impression: Status post CABG. status post pacemaker. No lung   consolidations. Trace right pleural effusion.    CXR: 22:  FINDINGS:  CATHETERS AND TUBES: None  PULMONARY: The visualized lungs are clear of airspace consolidations or effusions. No pneumothorax.  HEART/VASCULAR: The  heart is mildly enlarged in transverse diameter. Status postcoronary artery bypass graft procedure. Cardiac device wire leads are within right atrium and right ventricle. .  BONES: Visualized osseous structures are intact.  IMPRESSION:   No radiographic evidence of active chest disease.      CTA of Chest: 22:  FINDINGS:  LUNGS AND AIRWAYS: Patent central airways.  Right upper lobe postsurgical changes. Mosaic attenuation pattern to the bilateral upper lobes which may be related to edema or air trapping or small airways disease. Patchy opacities in the left lower lobe and to a lesser extent in the right lower lobe which may represent infection. Correlate clinically. Follow these findings to resolution to exclude other processes.  PLEURA: Trace bilateral pleural fluid.  MEDIASTINUM AND KATELYN: No lymphadenopathy.  VESSELS: No pulmonary embolism.  HEART: Cardiomegaly. Coronary artery calcifications. No pericardial effusion.  CHEST WALL AND LOWER NECK: Within normal limits.  VISUALIZED UPPER ABDOMEN: Within normal limits.  BONES: Sternotomy. Degenerative changes.  IMPRESSION:  No pulmonary embolism.  Mosaic attenuation pattern to the bilateral upper lobes which may be related to edema or air trapping or small airways disease. Patchy opacities in the left lower lobe and to a lesser extent in the right lower lobe which may represent infection. Correlate clinically. Follow these findings to resolution to exclude other processes.      EK22:  Wide QRS tachycardia  Right bundle branch block      TELEMETRY:  A-V pacing with underlying AF    ECHO:  2/15/22:  M-Mode Measurements (cm):   LVEDd: 5.66 cm            LVESd: 4.04 cm   IVSEd: 0.85 cm   LVPWd: 0.97 cm            AO Root Dimension: 3.8 cm                       ACS: 2.1 cm                             LA: 5.3 cm                             LVOT: 2.2 cm  Doppler Measurements:   AV Velocity:195 cm/s                MV Peak E-Wave: 68.6 cm/s   AV Peak Gradient: 15.21 mmHg        MV Peak A-Wave: 52.3 cm/s                                       MV E/A Ratio: 1.31 %   TR Velocity:305 cm/s                MV Peak Gradient: 1.88 mmHg   TR Gradient:37.21 mmHg   Estimated RAP:5 mmHg   RVSP:42 mmHg    Findings:  Mitral Valve:   Fibrocalcific changes noted to the mitral valve leaflets with preserved leaflet excursion.   Mild mitral regurgitation is present.    Aortic Valve:   Fibrocalcific changes noted to the Aortic valve leaflets with mildly reduced mobility but does not meet criteria for aortic stenosis.   Trace to mild aortic regurgitation is present.    Tricuspid Valve:   Normal appearing tricuspid valve structure.   Mild (1+) tricuspid valve regurgitation is present.   Mild pulmonary hypertension.    Pulmonic Valve:   Normal appearing pulmonic valve structure and function.    Left Atrium:   The left atrium is moderately dilated.    Left Ventricle:   The left ventricle is normal in size, wall thickness, wall motion and contractility.   Estimated left ventricular ejection fraction is 55-60 %.    Right Atrium:   Normal appearing right atrium.    Right Ventricle:   Normal appearing right ventricle structure and function.   A device wire is seen in the RV and RA.    Pericardial Effusion:   No evidence of pericardial effusion.    Miscellaneous:   IVC was not visualized.   No subcostal window seen.    Summary:   The left ventricle is normal in size, wall thickness, wall motion and contractility.   Estimated left ventricular ejection fraction is 55-60 %.   The left atrium is moderately dilated.   Normal appearing right atrium.   Normal appearing right ventricle structure and function.   A device wire is seen in the RV and RA.   Fibrocalcific changes noted to the Aortic valve leaflets with mildly reduced mobility but does not meet criteria for aortic stenosis.   Trace to mild aortic regurgitation is present.   Normal appearing tricuspid valve structure.   Mild (1+) tricuspid valve regurgitation is present.   Mild pulmonary hypertension.   No evidence of pericardial effusion.    Signature:   ----------------------------------------------------------------   Electronically signed by Kory Ibarra MD([facility] physician)   on 02/15/2022 06:19 PM   ----------------------------------------------------------------

## 2022-02-25 ENCOUNTER — TRANSCRIPTION ENCOUNTER (OUTPATIENT)
Age: 87
End: 2022-02-25

## 2022-02-25 VITALS
OXYGEN SATURATION: 98 % | RESPIRATION RATE: 16 BRPM | DIASTOLIC BLOOD PRESSURE: 55 MMHG | TEMPERATURE: 98 F | SYSTOLIC BLOOD PRESSURE: 104 MMHG | HEART RATE: 70 BPM

## 2022-02-25 LAB
ANION GAP SERPL CALC-SCNC: 7 MMOL/L — SIGNIFICANT CHANGE UP (ref 5–17)
BUN SERPL-MCNC: 11 MG/DL — SIGNIFICANT CHANGE UP (ref 7–23)
CALCIUM SERPL-MCNC: 8.2 MG/DL — LOW (ref 8.5–10.1)
CHLORIDE SERPL-SCNC: 103 MMOL/L — SIGNIFICANT CHANGE UP (ref 96–108)
CO2 SERPL-SCNC: 28 MMOL/L — SIGNIFICANT CHANGE UP (ref 22–31)
CREAT SERPL-MCNC: 0.74 MG/DL — SIGNIFICANT CHANGE UP (ref 0.5–1.3)
GLUCOSE SERPL-MCNC: 89 MG/DL — SIGNIFICANT CHANGE UP (ref 70–99)
HCT VFR BLD CALC: 27.4 % — LOW (ref 39–50)
HGB BLD-MCNC: 8.8 G/DL — LOW (ref 13–17)
MAGNESIUM SERPL-MCNC: 2.1 MG/DL — SIGNIFICANT CHANGE UP (ref 1.6–2.6)
MCHC RBC-ENTMCNC: 32 PG — SIGNIFICANT CHANGE UP (ref 27–34)
MCHC RBC-ENTMCNC: 32.1 GM/DL — SIGNIFICANT CHANGE UP (ref 32–36)
MCV RBC AUTO: 99.6 FL — SIGNIFICANT CHANGE UP (ref 80–100)
PHOSPHATE SERPL-MCNC: 2.7 MG/DL — SIGNIFICANT CHANGE UP (ref 2.5–4.5)
PLATELET # BLD AUTO: 384 K/UL — SIGNIFICANT CHANGE UP (ref 150–400)
POTASSIUM SERPL-MCNC: 3.6 MMOL/L — SIGNIFICANT CHANGE UP (ref 3.5–5.3)
POTASSIUM SERPL-SCNC: 3.6 MMOL/L — SIGNIFICANT CHANGE UP (ref 3.5–5.3)
RBC # BLD: 2.75 M/UL — LOW (ref 4.2–5.8)
RBC # FLD: 19.8 % — HIGH (ref 10.3–14.5)
SODIUM SERPL-SCNC: 138 MMOL/L — SIGNIFICANT CHANGE UP (ref 135–145)
WBC # BLD: 5.6 K/UL — SIGNIFICANT CHANGE UP (ref 3.8–10.5)
WBC # FLD AUTO: 5.6 K/UL — SIGNIFICANT CHANGE UP (ref 3.8–10.5)

## 2022-02-25 PROCEDURE — 99239 HOSP IP/OBS DSCHRG MGMT >30: CPT

## 2022-02-25 RX ORDER — DIGOXIN 250 MCG
1 TABLET ORAL
Qty: 0 | Refills: 0 | DISCHARGE
Start: 2022-02-25

## 2022-02-25 RX ADMIN — BUDESONIDE AND FORMOTEROL FUMARATE DIHYDRATE 2 PUFF(S): 160; 4.5 AEROSOL RESPIRATORY (INHALATION) at 08:16

## 2022-02-25 RX ADMIN — Medication 250 MICROGRAM(S): at 09:36

## 2022-02-25 RX ADMIN — Medication 100 MILLIGRAM(S): at 09:36

## 2022-02-25 RX ADMIN — Medication 25 MILLIGRAM(S): at 09:36

## 2022-02-25 RX ADMIN — TIOTROPIUM BROMIDE 1 CAPSULE(S): 18 CAPSULE ORAL; RESPIRATORY (INHALATION) at 08:16

## 2022-02-25 RX ADMIN — Medication 40 MILLIGRAM(S): at 09:36

## 2022-02-25 RX ADMIN — APIXABAN 5 MILLIGRAM(S): 2.5 TABLET, FILM COATED ORAL at 09:34

## 2022-02-25 RX ADMIN — Medication 10 MILLIEQUIVALENT(S): at 09:36

## 2022-02-25 RX ADMIN — PANTOPRAZOLE SODIUM 40 MILLIGRAM(S): 20 TABLET, DELAYED RELEASE ORAL at 09:36

## 2022-02-25 NOTE — PROGRESS NOTE ADULT - SUBJECTIVE AND OBJECTIVE BOX
Patient is a 86y old  Male who presents with a chief complaint of acute hypoxic respiratory failure  PNA (19 Feb 2022 13:52)      HPI:  85 y/o male with a PMHx of polycythemia vera, prostate CA, rheumatic fever, CAD s/p CABG,  Afib s/p PPM , HTN, ? Prostate CA  presents to the ED c/o generalized weakness, and  SOB. The pt has been feeling extreme weakness and SOB associated with fever for the past four days; hence, his wife advised him to visit [facility] for further evaluation.  Received Keytruda in the past for lung cancer  Per chart review from [facility], patient was seen by Dr Chow 12/2021  History of lung cancer, s/p right upper wedge resection 8/2016 with Dr Solorio.  Noted to have Adenocarcinoma that was 1.2cm  Treated at [facility] with chemotherapy and immunotherapy.  Developed rash after chemotherapy and it was stopped.  History of PAF as well, sees Dr Jett, treated with amiodarone as well.  PFT revealed mild obstructive ventilatory defect. no air trapping hyperinflation, DLCO moderately reduced.  CT Chest at [facility] 1/2022 revealed that in the left lung there is a groundglass density with a centrally dilated bronchus in the LLL  measuring 1.8cm in greatest dimension and in the medial aspect of the left lower lobe.   In the medial aspect of the RUL there is a spiculated nodular density with the central areas of aeration that measures 2.6cm by 1.3cm (neoplastic finding cannot be excluded)  Thereafter underwent a new CT Chest this admission that revealed Patent central airways.  Right upper lobe postsurgical   changes. Mosaic attenuation pattern to the bilateral upper lobes which may be related to edema or air trapping or small airways disease. Patchy   opacities in the left lower lobe and to a lesser extent in the right lower lobe which may represent infection.   Noted to be febrile overnight    2/23  Patient evaluated this morning, still noted to have febrile episodes overnight but Tm is reducing  CT Chest performed revealed Increased lung findings since 2/13/2022, favored to represent pulmonary edema.  Increased small pleural effusions. Stable subcentimeter nodules in the right lower lobe.   2/24  asks appropriate questions  feels weak but not dyspneic  no significant cough or sputum  breathing is not labored by speaking full sentences  2/25  covering for Dr Montemayor  cxr improved and pt is made aware  doing better clinically as well  no cp or difficulty breathing    PAST MEDICAL & SURGICAL HISTORY:  Atrial fibrillation    Hypertension    CAD (coronary artery disease)  stent in 2007    Pacemaker    Rheumatic fever  child traylor    Prostate ca  radiation treatment 5yrs ago    Symptomatic bradycardia    Polycythemia vera    Artificial cardiac pacemaker  2006    History of PTCA  with stent to RCA 2007      MEDICATIONS  (STANDING):  apixaban 5 milliGRAM(s) Oral every 12 hours  atorvastatin 40 milliGRAM(s) Oral at bedtime  budesonide 160 MICROgram(s)/formoterol 4.5 MICROgram(s) Inhaler 2 Puff(s) Inhalation two times a day  digoxin     Tablet 250 MICROGram(s) Oral daily  doxycycline hyclate Capsule 100 milliGRAM(s) Oral every 12 hours  furosemide    Tablet 40 milliGRAM(s) Oral daily  metoprolol tartrate 25 milliGRAM(s) Oral two times a day  pantoprazole    Tablet 40 milliGRAM(s) Oral two times a day  potassium chloride    Tablet ER 10 milliEquivalent(s) Oral daily  tiotropium 18 MICROgram(s) Capsule 1 Capsule(s) Inhalation daily      MEDICATIONS  (PRN):  acetaminophen     Tablet .. 650 milliGRAM(s) Oral every 6 hours PRN Temp greater or equal to 38.5C (101.3F), Moderate Pain (4 - 6)        FAMILY HISTORY:  Family history of bronchiectasis (Mother)        Vital Signs Last 24 Hrs  T(C): 37.6 (24 Feb 2022 22:50), Max: 37.6 (24 Feb 2022 22:50)  T(F): 99.6 (24 Feb 2022 22:50), Max: 99.6 (24 Feb 2022 22:50)  HR: 70 (24 Feb 2022 22:50) (70 - 73)  BP: 99/57 (24 Feb 2022 22:50) (99/57 - 101/55)  BP(mean): --  RR: 18 (24 Feb 2022 22:50) (16 - 18)  SpO2: 97% (24 Feb 2022 22:50) (95% - 98%)        PHYSICAL EXAM  General Appearance: cooperative, no acute distress,   HEENT: PERRL, conjunctiva clear, EOM's intact, non injected pharynx, no exudate, TM   normal  Neck: Supple, , no adenopathy, thyroid: not enlarged, no carotid bruit or JVD  Back: Symmetric, no  tenderness,no soft tissue tenderness  Lungs: coarse bilaterally   Heart: Regular rate and rhythm, S1, S2 normal, no murmur, rub or gallop  Abdomen: Soft, non-tender, bowel sounds active , no hepatosplenomegaly  Extremities: no cyanosis or edema, no joint swelling  Skin: Skin color, texture normal, no rashes   Neurologic: Alert and oriented X3 , cranial nerves intact, sensory and motor normal,    ECG:    LABS:                          8.3    4.14  )-----------( 256      ( 20 Feb 2022 07:19 )             25.6     02-19    134<L>  |  102  |  15  ----------------------------<  96  4.8   |  25  |  0.90    Ca    8.6      19 Feb 2022 06:41  Phos  2.6     02-19  Mg     2.3     02-19    TPro  6.3  /  Alb  2.3<L>  /  TBili  1.4<H>  /  DBili  x   /  AST  50<H>  /  ALT  61  /  AlkPhos  74  02-19          Pro BNP  4288 02-15 @ 07:08  D Dimer  -- 02-15 @ 07:08  Pro BNP  5332 02-13 @ 21:55  D Dimer  -- 02-13 @ 21:55              RADIOLOGY & ADDITIONAL STUDIES:  < from: CT Chest No Cont (02.22.22 @ 09:49) >  IMPRESSION:    Increased lung findings since 2/13/2022, favored to represent pulmonary   edema.    Increased small pleural effusions.    Stable subcentimeter nodules in the right lower lobe. 3 month follow-up   is recommended.    < end of copied text >    < from: Xray Chest 1 View- PORTABLE-Routine (Xray Chest 1 View- PORTABLE-Routine .) (02.24.22 @ 10:01) >  COMPARISON STUDY: 2/22/2022    Frontal expiratory view of the chest shows the heart to be normal in   size. Sternal wires and left cardiac pacemaker are again noted.    The lungs show partial clearing of the lungs with similar small right   effusion and there is no evidence of pneumothorax nor left pleural   effusion.    IMPRESSION:  Resolving infiltrates.    < end of copied text >

## 2022-02-25 NOTE — PROGRESS NOTE ADULT - PROBLEM SELECTOR PROBLEM 7
Pulmonic valve regurgitation

## 2022-02-25 NOTE — PROGRESS NOTE ADULT - REASON FOR ADMISSION
acute hypoxic respiratory failure  PNA

## 2022-02-25 NOTE — PROGRESS NOTE ADULT - ASSESSMENT
2/15/22: Pt with above history and lung CA on chemoRx with SOB but likely not of cardiac origin.  Being treated for PNA in the face of underlying lung CA but clinically appears better.  His AF is rate controlled with an underlying PPM for bradycardia protection.  I would continue his current meds including the Digoxin as it does not appear to be near toxic range.  Continue as outlined by medicine and ID etal.  Will follow as work-up and treatment progresses.    2/16/22:  Less SOB.  No anginal chest pains or other cardiac changes.  A-V pacing on the monitor.  Continue as outlined by medicine and ID etal.  Will follow as work-up and treatment progresses.    2/17/22:  Slowly improving.  Remains afebrile.  H&H low but no obvious bleeding.  A-v pacing on the monitor.  No new cardiac symptoms.  Continue as outlined by medicine and ID etal.  Will continue to follow as treatment progresses.    2/18/22:  Feeling good.  A-V pacing on the monitor. No new cardiac symptoms.  Was scheduled for chemoRx at [place] next week but this was cancelled due to his PNA.  Continue as outlined by medicine and ID etal.  I will be away this weekend.  Dr Quintero will be covering me if needed.    2/21/22;  Feeling better with less SOB.  A-V pacing on the monitor with occasional AF with controlled VR.  No new cardiac symptoms.  H/H 8.3 twice yesterday.  Continue as outlined by medicine and ID etal.  Will continue to follow intermittently prn.    2/22/22:  Continue to feel good.  Hgb up to 8.8 yesterday.  Maintaining A-V pacing on the monitor.  No new cardiac issues so far.  Eager to go home.  Continue as outlined by medicine, oncology and ID etal.  Will continue to follow intermittently prn.  Follow up at [place] an needed.    2/23/22:  Still with low grade temps to 100.8 & 100.1 but now 99.6.  Maintaining A-V pacing on the monitor.  Hgb=9.1 yesterday and continues to improve. CT of Chest as below.  No new cardiac issues so far.  Continue as outlined by medicine, pulmonary, oncology and ID etal.  Will continue to follow intermittently prn.    2/24/22:  Feels better with less SOB.  CT of chest suggest possible mild pulmonary edema.  May benefit from Lasix prn.  Tmax yesterday 99.9 in the AM and trending down since. VVS otherwise.  For possible discharge soon.  No new cardiac changes otherwise.  Continue as outlined by medicine, pulmonary, oncology and ID etal.  Will continue to follow intermittently prn and as an outpt as needed.  He also sees Dr Jett at [facility] for cardiology.    2/25/22:  Continue to improve  CXR better and no increased SOB or anginal chest pains.  VSS and Tmax=99.6 last night.  No new cardiac issues so far.  Continue as outlined by medicine, pulmonary, oncology and ID etal.  Will continue to follow intermittently prn and as an outpt as needed.  He also sees Dr Jett at [facility] for cardiology.

## 2022-02-25 NOTE — PROGRESS NOTE ADULT - PROVIDER SPECIALTY LIST ADULT
Family Medicine
Heme/Onc
Hospitalist
Cardiology
Critical Care
Family Medicine
Heme/Onc
Infectious Disease
Pulmonology
Critical Care
Family Medicine
Family Medicine
Heme/Onc
Hospitalist
Pulmonology
Family Medicine
Heme/Onc
Infectious Disease
Critical Care
Cardiology

## 2022-02-25 NOTE — PROGRESS NOTE ADULT - ASSESSMENT
This is an 86 year old male with history of NSCLC ADENOCARINOMA on Pemetrexed MONOTHERAPY now admitted for PNA with recurrent fevers after course of antibiotics    NSCLC   - now on active treatment with Dr. Frausto   - patient on PEMETREXED   - PATIENT HAS NOT RECEIVED IMMUNOTHERAPY SINCE beginning of JANUARY, Doubt I/O as source of recurrent fevers  - patient likely cytopenic from recent chemotherapy administration - w/ pemetrexed  - pt will go to rehab center and then f/u with [facility] therafter    Sepsis secondary to PNA   - CEFEPIME has been discontinued- pt still spiking fevers yesterday am- none since 8 am on 2/22  - DOXYCYCLINE NOW - afebrile   - send stool studies on next bm     ANEMIA   - possibly secondary to antineoplastic therapy vs GIB- pt has not received pRBC transfusion during therapy before  - seen by GI no plan for endoscopy FOBT negative- c/w protonix  - Hb now 8.5 --> 8.8 stable     dc to rehab    Dr. Chad Cristina  cell: [phone]  Weekends and nights please call [phone] for MD on call  [facility]  Hematology/Oncology

## 2022-02-25 NOTE — DISCHARGE NOTE NURSING/CASE MANAGEMENT/SOCIAL WORK - NSDCPEFALRISK_GEN_ALL_CORE
For information on Fall & Injury Prevention, visit: [email or web address] OR  [email or web address] OR  https://www.cdc.gov/steadi/patient.html

## 2022-02-25 NOTE — PROGRESS NOTE ADULT - SUBJECTIVE AND OBJECTIVE BOX
CHIEF COMPLAINT:  Patient is a 86y old  Male who presents with a chief complaint of acute hypoxic respiratory failure  PNA (15 Feb 2022 16:25)      HPI: 2/15/22:  87 y/o male known to me with a PMHx of polycythemia vera, prostate CA, rheumatic fever, CAD s/p CABG,  chronic Afib s/p PPM , HTN, ? Prostate CA  presents to the ED c/o generalized weakness, and  SOB. The pt has been feeling extreme weakness and SOB associated with fever for the past four days; hence, his wife advised him to visit [facility] for further evaluation. He claims that his weakness was so severe that he could not get up from his chair or walk properly. In addition, his PO intake decreased drastically . Pt denied chest pain, nausea, vomiting, loc, history of fall, diarrhea, and SD bleeding.   Pt received chemotherapy 3 To 4 weeks back for his lung cancer, and subsequent scheduled chemotherapy is in one week. Then, last October, he was admitted to [facility] for GI bleeding.   Currently he denies anginal chest pains, palpitations, dizziness or syncope.  He had an echo today showing normal LV systolic function and LVEF approx. 55-60% with moderately dilated LA and mild MR, TR, AI and PI.  He has no new cardiac symptoms otherwise.    22:  Less SOB.  No anginal chest pains or other cardiac changes.  A-V pacing on the monitor.    22:  Slowly improving.  Remains afebrile.  H&H low but no obvious bleeding.  A-v pacing on the monitor.  No new cardiac symptoms.    22:  Feeling good.  A-V pacing on the monitor. No new cardiac symptoms.  Was scheduled for chemoRx at [facility] next week but this was cancelled due to his PNA.    22;  Feeling better with less SOB.  A-V pacing on the monitor with occasional AF with controlled VR.  No new cardiac symptoms.  H/H 8.3 twice yesterday.    22:  Continue to feel good.  Hgb up to 8.8 yesterday.  Maintaining A-V pacing on the monitor.  No new cardiac issues so far.  Eager to go home.    22:  Still with low grade temps to 100.8 & 100.1 but now 99.6.  Maintaining A-V pacing on the monitor.  Hgb=9.1 yesterday and continues to improve. CT of Chest as below.  No new cardiac issues so far.    22:  Feels better with less SOB.  CT of chest suggest possible mild pulmonary edema.  May benefit from Lasix prn.  Tmax yesterday 99.9 in the AM and trending down since. VVS otherwise.  For possible discharge soon.  No new cardiac changes otherwise.    22:  Continue to improve  CXR better and no increased SOB or anginal chest pains.  VSS and Tmax=99.6 last night.  No new cardiac issues so far.        PMHx:  PAST MEDICAL & SURGICAL HISTORY:  Atrial fibrillation  Hypertension  CAD (coronary artery disease)-stent in   CABG  Pacemaker  Rheumatic fever-child traylor  Prostate ca-radiation treatment 5yrs ago  Symptomatic bradycardia  Polycythemia vera  Artificial cardiac pacemaker-  History of PTCA-with stent to RCA       FAMILY HISTORY:   FAMILY HISTORY:-  Family history of bronchiectasis (Mother)      ALLERGIES:  Allergies  No Known Allergies      REVIEW OF SYSTEMS:  10 point ROS was obtained  Pertinent positives and negatives are as above  All other review of systems is negative unless indicated above      Vital Signs Last 24 Hrs  T(C): 37.6 (2022 22:50), Max: 37.6 (2022 22:50)  T(F): 99.6 (2022 22:50), Max: 99.6 (2022 22:50)  HR: 70 (2022 22:50) (70 - 73)  BP: 99/57 (2022 22:50) (99/57 - 101/55)  RR: 18 (2022 22:50) (16 - 18)  SpO2: 97% (2022 22:50) (95% - 98%)      I&O's Summary    2022 07:01  -  15 Feb 2022 07:00  --------------------------------------------------------  IN: 829 mL / OUT: 1975 mL / NET: -1146 mL    15 Feb 2022 07:01  -  15 Feb 2022 20:06  --------------------------------------------------------  IN: 60.7 mL / OUT: 0 mL / NET: 60.7 mL        PHYSICAL EXAM:   Constitutional: NAD, awake and alert, well-developed  HEENT: PERR, EOMI, Normal Hearing, MMM  Neck: Soft and supple, No LAD, No JVD  Respiratory: Breath sounds are clear bilaterally, No wheezing, rales or rhonchi, PPM in left chest wall  Cardiovascular: S1 and S2, regular rate and regular rhythm, soft ANA M at LLSB and base as before, no gallops or rubs  Gastrointestinal: Bowel Sounds present, soft, nontender, nondistended, no guarding, no rebound  Extremities: No peripheral edema  Vascular: 2+ peripheral pulses  Neurological: A/O x 3, no focal deficits  Musculoskeletal: 5/5 strength b/l upper and lower extremities      MEDICATIONS  (STANDING):  apixaban 5 milliGRAM(s) Oral every 12 hours  atorvastatin 40 milliGRAM(s) Oral at bedtime  budesonide 160 MICROgram(s)/formoterol 4.5 MICROgram(s) Inhaler 2 Puff(s) Inhalation two times a day  digoxin     Tablet 250 MICROGram(s) Oral daily  doxycycline hyclate Capsule 100 milliGRAM(s) Oral every 12 hours  furosemide    Tablet 40 milliGRAM(s) Oral daily  metoprolol tartrate 25 milliGRAM(s) Oral two times a day  pantoprazole    Tablet 40 milliGRAM(s) Oral two times a day  potassium chloride    Tablet ER 10 milliEquivalent(s) Oral daily  tiotropium 18 MICROgram(s) Capsule 1 Capsule(s) Inhalation daily    MEDICATIONS  (PRN):  acetaminophen     Tablet .. 650 milliGRAM(s) Oral every 6 hours PRN Temp greater or equal to 38.5C (101.3F), Moderate Pain (4 - 6)      LABS: All Labs Reviewed:                        8.5    5.49  )-----------( 367      ( 2022 08:47 )             25.9                           9.0    5.75  )-----------( 373      ( 2022 07:08 )             28.0                           9.1    5.13  )-----------( 344      ( 2022 07:59 )             28.8                           8.8    4.36  )-----------( 300      ( 2022 11:34 )             26.3                           8.3    x     )-----------( x        ( 2022 18:01 )             25.0                           6.8    6.15  )-----------( 148      ( 2022 06:23 )             20.8                           7.9    4.49  )-----------( 96       ( 15 Feb 2022 06:59 )             23.9       02-24    138  |  103  |  10  ----------------------------<  82  3.7   |  29  |  0.64    Ca    8.1<L>      2022 08:47  Phos  2.5     02-24  Mg     2.2     -    136  |  101  |  10  ----------------------------<  93  3.9   |  28  |  0.81    Ca    8.2<L>      2022 07:08  Phos  2.3     02-23  Mg     2.1     --    135  |  103  |  10  ----------------------------<  95  4.1   |  27  |  0.72    Ca    8.0<L>      2022 07:59  Phos  2.0     02-22  Mg     2.1     02-22        02-    136  |  102  |  14  ----------------------------<  132<H>  3.7   |  29  |  0.84    Ca    8.0<L>      2022 11:34      02-20    136  |  103  |  15  ----------------------------<  91  3.2<L>   |  27  |  0.72    Ca    7.9      2022 07:19            139  |  109<H>  |  24<H>  ----------------------------<  143<H>  4.3   |  25  |  1.02    Ca    8.5      2022 06:23      02-    135  |  104  |  23  ----------------------------<  167<H>  3.8   |  23  |  0.88    Ca    8.2<L>      2022 06:51    TPro  5.5<L>  /  Alb  1.8<L>  /  TBili  1.0  /  DBili  0.4<H>  /  AST  35  /  ALT  33  /  AlkPhos  57  02-15      02-15    135  |  105  |  17  ----------------------------<  161<H>  3.6   |  22  |  0.73    Ca    8.0<L>      15 Feb 2022 06:59    TPro  5.5<L>  /  Alb  1.8<L>  /  TBili  1.0  /  DBili  0.4<H>  /  AST  35  /  ALT  33  /  AlkPhos  57  02-15    PT/INR - ( 15 Feb 2022 06:59 )   PT: 18.3 sec;   INR: 1.61 ratio       PTT - ( 2022 21:55 )  PTT:33.8 sec    Troponin I, High Sensitivity (22 @ 21:55): 91.23    Serum Pro-Brain Natriuretic Peptide: 4288 pg/mL (02-15 @ 07:08)  Serum Pro-Brain Natriuretic Peptide: 5332 pg/mL ( @ 21:55)    Digoxin Level, Serum (22 @ 06:41): 1.42 ng/mL   Digoxin Level, Serum (02.15.22 @ 06:59): .86 ng/mL     CULTURES:   22 & 22:  Organism --  Gram Stain Blood -- Gram Stain --  Specimen Source .Blood None  Culture-Blood --No growth to date.     2/13/22:  Organism --  Gram Stain Urine -- Gram Stain --  Specimen Source Clean Catch None  Culture-Urine --No growth       LIPID PROFILE     RADIOLOGY:   CXR: 22:  COMPARISON STUDY: 2022  Frontal expiratory view of the chest shows the heart to be normal in size. Sternal wires and left cardiac pacemaker are again noted.  The lungs show partial clearing of the lungs with similar small right effusion and there is no evidence of pneumothorax nor left pleural effusion.  IMPRESSION:  Resolving infiltrates.    CXR: 22:  Frontal expiratory view of the chest shows the heart to be similar in size. Sternal wires and left cardiac pacemaker are again noted.  The lungs show progression of right upper lobe infiltrate and there is no evidence of pneumothorax nor pleural effusion.  IMPRESSION:  Right upper lobe infiltrate.    CT of Chest: 22:  FINDINGS:  LUNGS/AIRWAYS/PLEURA: Increased largely central ground glass and interlobular septal thickening in all lobes of the right lung and left upper lobe. Stable few subcentimeter nodules in the right lower lobe, for example, 4-198. Right upper lobe wedge resection. Patent trachea and bronchi. Increased small pleural effusions.  LYMPH NODES/MEDIASTINUM: No enlarged lymph nodes.  HEART/VASCULATURE: Normal heart size. Unremarkable pericardium. CABG. Mildly calcified aortic valve and aorta. Normal caliber aorta and main pulmonary artery. Appropriate course of dual-chamber pacemaker.  UPPER ABDOMEN: Cholelithiasis. Gastric fundal diverticulum.  BONES/SOFT TISSUES: Sternotomy. Degenerative changes of the spine.  IMPRESSION:  Increased lung findings since 2022, favored to represent pulmonary edema.  Increased small pleural effusions.  Stable subcentimeter nodules in the right lower lobe. 3 month follow-up is recommended.     CXR: 22:  Findings/  Impression: Status post CABG. status post pacemaker. No lung   consolidations. Trace right pleural effusion.    CXR: 22:  FINDINGS:  CATHETERS AND TUBES: None  PULMONARY: The visualized lungs are clear of airspace consolidations or effusions. No pneumothorax.  HEART/VASCULAR: The  heart is mildly enlarged in transverse diameter. Status postcoronary artery bypass graft procedure. Cardiac device wire leads are within right atrium and right ventricle. .  BONES: Visualized osseous structures are intact.  IMPRESSION:   No radiographic evidence of active chest disease.      CTA of Chest: 22:  FINDINGS:  LUNGS AND AIRWAYS: Patent central airways.  Right upper lobe postsurgical changes. Mosaic attenuation pattern to the bilateral upper lobes which may be related to edema or air trapping or small airways disease. Patchy opacities in the left lower lobe and to a lesser extent in the right lower lobe which may represent infection. Correlate clinically. Follow these findings to resolution to exclude other processes.  PLEURA: Trace bilateral pleural fluid.  MEDIASTINUM AND KATELYN: No lymphadenopathy.  VESSELS: No pulmonary embolism.  HEART: Cardiomegaly. Coronary artery calcifications. No pericardial effusion.  CHEST WALL AND LOWER NECK: Within normal limits.  VISUALIZED UPPER ABDOMEN: Within normal limits.  BONES: Sternotomy. Degenerative changes.  IMPRESSION:  No pulmonary embolism.  Mosaic attenuation pattern to the bilateral upper lobes which may be related to edema or air trapping or small airways disease. Patchy opacities in the left lower lobe and to a lesser extent in the right lower lobe which may represent infection. Correlate clinically. Follow these findings to resolution to exclude other processes.      EK22:  Wide QRS tachycardia  Right bundle branch block      TELEMETRY:  A-V pacing with underlying AF    ECHO:  2/15/22:  M-Mode Measurements (cm):   LVEDd: 5.66 cm            LVESd: 4.04 cm   IVSEd: 0.85 cm   LVPWd: 0.97 cm            AO Root Dimension: 3.8 cm                       ACS: 2.1 cm                             LA: 5.3 cm                             LVOT: 2.2 cm  Doppler Measurements:   AV Velocity:195 cm/s                MV Peak E-Wave: 68.6 cm/s   AV Peak Gradient: 15.21 mmHg        MV Peak A-Wave: 52.3 cm/s                                       MV E/A Ratio: 1.31 %   TR Velocity:305 cm/s                MV Peak Gradient: 1.88 mmHg   TR Gradient:37.21 mmHg   Estimated RAP:5 mmHg   RVSP:42 mmHg    Findings:  Mitral Valve:   Fibrocalcific changes noted to the mitral valve leaflets with preserved leaflet excursion.   Mild mitral regurgitation is present.    Aortic Valve:   Fibrocalcific changes noted to the Aortic valve leaflets with mildly reduced mobility but does not meet criteria for aortic stenosis.   Trace to mild aortic regurgitation is present.    Tricuspid Valve:   Normal appearing tricuspid valve structure.   Mild (1+) tricuspid valve regurgitation is present.   Mild pulmonary hypertension.    Pulmonic Valve:   Normal appearing pulmonic valve structure and function.    Left Atrium:   The left atrium is moderately dilated.    Left Ventricle:   The left ventricle is normal in size, wall thickness, wall motion and contractility.   Estimated left ventricular ejection fraction is 55-60 %.    Right Atrium:   Normal appearing right atrium.    Right Ventricle:   Normal appearing right ventricle structure and function.   A device wire is seen in the RV and RA.    Pericardial Effusion:   No evidence of pericardial effusion.    Miscellaneous:   IVC was not visualized.   No subcostal window seen.    Summary:   The left ventricle is normal in size, wall thickness, wall motion and contractility.   Estimated left ventricular ejection fraction is 55-60 %.   The left atrium is moderately dilated.   Normal appearing right atrium.   Normal appearing right ventricle structure and function.   A device wire is seen in the RV and RA.   Fibrocalcific changes noted to the Aortic valve leaflets with mildly reduced mobility but does not meet criteria for aortic stenosis.   Trace to mild aortic regurgitation is present.   Normal appearing tricuspid valve structure.   Mild (1+) tricuspid valve regurgitation is present.   Mild pulmonary hypertension.   No evidence of pericardial effusion.    Signature:   ----------------------------------------------------------------   Electronically signed by Kory Ibarra MD(Interpreting physician)   on 02/15/2022 06:19 PM   ----------------------------------------------------------------

## 2022-02-25 NOTE — PROGRESS NOTE ADULT - PROBLEM SELECTOR PROBLEM 6
Mild aortic regurgitation

## 2022-02-25 NOTE — PROGRESS NOTE ADULT - PROBLEM SELECTOR PROBLEM 4
Mild mitral regurgitation

## 2022-02-25 NOTE — PROGRESS NOTE ADULT - SUBJECTIVE AND OBJECTIVE BOX
INTERVAL HPI/OVERNIGHT EVENTS:  Patient S&E at bedside. Overnight, pt with 1 BM, loose and states that he was unable to get to bathroom. Reports feeling weak today. Plan for dc to rehab. Remains on doxy q 12.     PAST MEDICAL & SURGICAL HISTORY:  Atrial fibrillation    Hypertension    CAD (coronary artery disease)  stent in 2007    Pacemaker    Rheumatic fever  child traylor    Prostate ca  radiation treatment 5yrs ago    Symptomatic bradycardia    Polycythemia vera    Artificial cardiac pacemaker  2006    History of PTCA  with stent to RCA 2007        FAMILY HISTORY:  Family history of bronchiectasis (Mother)        VITAL SIGNS:  T(F): 97.6 (02-25-22 @ 09:30)  HR: 71 (02-25-22 @ 09:30)  BP: 125/66 (02-25-22 @ 09:30)  RR: 18 (02-25-22 @ 08:14)  SpO2: 99% (02-25-22 @ 08:14)  Wt(kg): --    PHYSICAL EXAM:    Constitutional: NAD  Eyes: EOMI, sclera non-icteric  Neck: supple, no masses, no JVD  Respiratory: CTA b/l, good air entry b/l  Cardiovascular: RRR, no M/R/G  Gastrointestinal: soft, NTND, no masses palpable, + BS, no hepatosplenomegaly  Extremities: no c/c/e  skin tear LUE   Neurological: AAOx3      MEDICATIONS  (STANDING):  apixaban 5 milliGRAM(s) Oral every 12 hours  atorvastatin 40 milliGRAM(s) Oral at bedtime  budesonide 160 MICROgram(s)/formoterol 4.5 MICROgram(s) Inhaler 2 Puff(s) Inhalation two times a day  digoxin     Tablet 250 MICROGram(s) Oral daily  doxycycline hyclate Capsule 100 milliGRAM(s) Oral every 12 hours  furosemide    Tablet 40 milliGRAM(s) Oral daily  metoprolol tartrate 25 milliGRAM(s) Oral two times a day  pantoprazole    Tablet 40 milliGRAM(s) Oral two times a day  potassium chloride    Tablet ER 10 milliEquivalent(s) Oral daily  tiotropium 18 MICROgram(s) Capsule 1 Capsule(s) Inhalation daily    MEDICATIONS  (PRN):  acetaminophen     Tablet .. 650 milliGRAM(s) Oral every 6 hours PRN Temp greater or equal to 38.5C (101.3F), Moderate Pain (4 - 6)      Allergies    No Known Allergies    Intolerances        LABS:                        8.8    5.60  )-----------( 384      ( 25 Feb 2022 07:40 )             27.4     02-25    138  |  103  |  11  ----------------------------<  89  3.6   |  28  |  0.74    Ca    8.2<L>      25 Feb 2022 07:40  Phos  2.7     02-25  Mg     2.1     02-25            RADIOLOGY & ADDITIONAL TESTS:  Studies reviewed.

## 2022-02-25 NOTE — PROGRESS NOTE ADULT - SUBJECTIVE AND OBJECTIVE BOX
HPI: 87 y/o male with a PMHx of polycythemia vera, prostate CA, rheumatic fever, CAD s/p CABG,  Afib s/p PPM , HTN, ? Prostate CA  admitted with septic shock 2/2 HAP s/p pressors in CCU now in floors.    Subjective: Pt reports no acute events overnight. He endorses continual improvement on his weakness, but does desire further rehab to return to baseline as closely as possible. Pt reports that his breathing is fine with no dyspnea. Pt denies ha, dizziness, cp, n/v, changes in urination or bm. No other complaints or symptoms noted at this time. Pt's family is at bedside. Discussed plan in detail, all questions and issues answered. Pt and family is amenable.  ROS as stated above.     Vital Signs Last 24 Hrs  T(C): 36.8 (25 Feb 2022 14:42), Max: 37.6 (24 Feb 2022 22:50)  T(F): 98.3 (25 Feb 2022 14:42), Max: 99.6 (24 Feb 2022 22:50)  HR: 70 (25 Feb 2022 14:42) (69 - 73)  BP: 104/55 (25 Feb 2022 14:42) (99/57 - 125/66)  BP(mean): --  RR: 16 (25 Feb 2022 14:42) (16 - 18)  SpO2: 98% (25 Feb 2022 14:42) (96% - 99%)    Physical Exam  GENERAL: NAD, Lying in the bed comfortably  HEAD:  Atraumatic, Normocephalic  EYES: EOMI, PERRLA, conjunctiva and sclera clear  ENT: Moist mucous membranes  NECK: Supple, No JVD  CHEST/LUNG: Improved. lung sounds clear b/l with unlabored respirations.   HEART: RRR.  No murmurs, rubs, or gallops  ABDOMEN: Bowel sounds present; Soft, Nontender, Nondistended. No hepatomegaly  EXTREMITIES:  2+ Peripheral Pulses  NERVOUS SYSTEM:  Alert & Oriented X3, speech clear. No deficits   MSK: FROM all 4 extremities, full and equal strength  SKIN: Ecchymosis noted on the upper extremities bilaterally      LABS: All Labs Reviewed:                        8.8    5.60  )-----------( 384      ( 25 Feb 2022 07:40 )             27.4     02-25    138  |  103  |  11  ----------------------------<  89  3.6   |  28  |  0.74    Ca    8.2<L>      25 Feb 2022 07:40  Phos  2.7     02-25  Mg     2.1     02-25            Blood Culture: 02-23 @ 08:50  Organism --  Gram Stain Blood -- Gram Stain --  Specimen Source Clean Catch Clean Catch (Midstream)  Culture-Blood --    02-22 @ 11:27  Organism --  Gram Stain Blood -- Gram Stain --  Specimen Source .Blood None  Culture-Blood --    02-21 @ 18:43  Organism --  Gram Stain Blood -- Gram Stain --  Specimen Source .Blood None  Culture-Blood --      I&O's Summary    25 Feb 2022 07:01  -  25 Feb 2022 18:45  --------------------------------------------------------  IN: 240 mL / OUT: 200 mL / NET: 40 mL      CAPILLARY BLOOD GLUCOSE    EKG/RADIOLOGY  < from: Xray Chest 1 View-PORTABLE IMMEDIATE (Xray Chest 1 View-PORTABLE IMMEDIATE .) (02.19.22 @ 06:33) >  Findings/  Impression: Status post CABG. status post pacemaker. No lung   consolidations. Trace right pleural effusion.    --- End of Report ---    < end of copied text >  < from: CT Angio Chest PE Protocol w/ IV Cont (02.13.22 @ 23:16) >  IMPRESSION:    No pulmonary embolism.    Mosaic attenuation pattern to the bilateral upper lobes which may be   related to edema or air trapping or small airways disease. Patchy   opacities in the left lower lobe and to a lesser extent in the right   lower lobe which may represent infection. Correlate clinically. Follow   these findings to resolution to exclude other processes.    < from: Xray Chest 1 View- PORTABLE-Urgent (Xray Chest 1 View- PORTABLE-Urgent .) (02.13.22 @ 22:49) >  IMPRESSION:   No radiographic evidence of active chest disease.    < from: Xray Chest 1 View- PORTABLE-Routine (Xray Chest 1 View- PORTABLE-Routine .) (02.24.22 @ 10:01) >  INTERPRETATION:  Chest one view    HISTORY: Dyspnea    COMPARISON STUDY: 2/22/2022    Frontal expiratory view of the chest shows the heart to be normal in   size. Sternal wires and left cardiac pacemaker are again noted.    The lungs show partial clearing of the lungs with similar small right   effusion and there is no evidence of pneumothorax nor left pleural   effusion.    IMPRESSION:  Resolving infiltrates.    < end of copied text >

## 2022-02-25 NOTE — DISCHARGE NOTE NURSING/CASE MANAGEMENT/SOCIAL WORK - PATIENT PORTAL LINK FT
You can access the FollowMyHealth Patient Portal offered by [facility] by registering at the following website: [email or web address]. By joining [facility]’s FollowMyHealth portal, you will also be able to view your health information using other applications (apps) compatible with our system.

## 2022-02-25 NOTE — PROGRESS NOTE ADULT - PROBLEM SELECTOR PROBLEM 8
Status post coronary artery bypass grafting

## 2022-02-25 NOTE — PROGRESS NOTE ADULT - ASSESSMENT
1) Dyspnea  2) Adenocarcinoma (unclear full staging)  3) Abnormal CT Chest  4) COPD    87 y/o male with a PMHx of polycythemia vera, prostate CA, rheumatic fever, CAD s/p CABG,  Afib s/p PPM , HTN, ? Prostate CA  presents to the ED c/o generalized weakness, and  SOB. The pt has been feeling extreme weakness and SOB associated with fever for the past four days; hence, his wife advised him to visit [facility] for further evaluation.  Received Keytruda in the past for lung cancer  Per chart review from [facility], patient was seen by Dr Chow 12/2021  History of lung cancer, s/p right upper wedge resection 8/2016 with Dr Solorio.  Noted to have Adenocarcinoma that was 1.2cm  [place] [facility] [place].  Developed rash after chemotherapy and it was stopped.  History of PAF as well, sees Dr Jett, treated with amiodarone as well.  PFT revealed mild obstructive ventilatory defect. no air trapping hyperinflation, DLCO moderately reduced.  CT Chest at [facility] 1/2022 revealed that in the left lung there is a groundglass density with a centrally dilated bronchus in the LLL  measuring 1.8cm in greatest dimension and in the medial aspect of the left lower lobe.   In the medial aspect of the RUL there is a spiculated nodular density with the central areas of aeration that measures 2.6cm by 1.3cm (neoplastic finding cannot be excluded)  Thereafter underwent a new CT Chest this admission that revealed Patent central airways.  Right upper lobe postsurgical changes. Mosaic attenuation pattern to the bilateral upper lobes which may be related to edema or air trapping or small airways disease. Patchy opacities in the left lower lobe and to a lesser extent in the right lower lobe which may represent infection.   CT Chest revealed Patient evaluated this morning, still noted to have febrile episodes overnight   CT Chest performed revealed Increased lung findings since 2/13/2022, favored to represent pulmonary edema.  Increased small pleural effusions. Stable subcentimeter nodules in the right lower lobe.   In terms of etiology of the mosaic attenuation/air trapping/vascular disease on CT Chest, the thought process is that it is likely small airway disease   Appreciate ID recommendations  Patient states that the Symbicort 2 puffs BID and Spiriva have been helping  May need CT Abdomen if he continues to have febrile episodes. It is also possible for fevers to occur two months after the last dose of immunotherapy so if the fevers do in fact persist, will consider starting Steroids   Will discuss further with hospitalist service. Discussed with ID  2/24-2/25  Covering for Dr Montemayor  pt asks appropriate questions, all answered  feels weak but not dyspneic  no significant cough or sputum  breathing is not labored by speaking full sentences  will need rehab  also needs Home o2 eval, discussed  cxr repeat discussed with resolving infiltrates  fu with Dr Montemayor as outpt  Thank you

## 2022-02-25 NOTE — PROGRESS NOTE ADULT - PROBLEM SELECTOR PROBLEM 9
Malignant neoplasm of lung

## 2022-02-25 NOTE — PROGRESS NOTE ADULT - PROBLEM SELECTOR PROBLEM 5
Nonrheumatic tricuspid valve regurgitation

## 2022-02-25 NOTE — PROGRESS NOTE ADULT - ATTENDING COMMENTS
87 y/o male with a PMHx of polycythemia vera, prostate CA, rheumatic fever, CAD s/p CABG,  Afib s/p PPM , HTN, ? Prostate CA c/o generalized weakness, and  SOB. He is evaluated for:    # Severe sepsis and acute respiratory failure (Improved)  #Febrile Episodes (Improved)  - Improved; non-dyspneic, non-labored breathing. CXR 2/24 shows improved infiltrates   - PT was spiking fevers however is currently afebrile possibly 2/2 residual infection vs malignancy vs antineoplastic therapy  - WBC WNL, BCX Urine Cx negative, Lactate now WNL  - S/p 7 day course of cefepime  - ID recs:  C/w doxycycline 100 mg po q 12 hours for 5 days, now day 3  - C/w lasiks 40 mg qD   - Heme onc following  - Pulmonology following  - Cardiology following  - Discharge today

## 2022-02-25 NOTE — PROGRESS NOTE ADULT - PROBLEM SELECTOR PROBLEM 3
Presence of cardiac pacemaker

## 2022-02-25 NOTE — PROGRESS NOTE ADULT - ASSESSMENT
85 y/o male with a PMHx of polycythemia vera, prostate CA, rheumatic fever, CAD s/p CABG,  Afib s/p PPM , HTN, ? Prostate CA c/o generalized weakness, and  SOB. He is evaluated for:    # Severe sepsis and acute respiratory failure (Improved)  #Febrile Episodes (Improved)  - Improved; non-dyspneic, non-labored breathing. CXR 2/24 shows improved infiltrates   - PT was spiking fevers however is currently afebrile possibly 2/2 residual infection vs malignancy vs antineoplastic therapy  - WBC WNL, BCX Urine Cx negative, Lactate now WNL  - S/p 7 day course of cefepime  - ID recs:  C/w doxycycline 100 mg po q 12 hours for 5 days, now day 3  - C/w lasiks 40 mg qD   - Heme onc following  - Pulmonology following  - Cardiology following  - Discharge today    # Chronic CHF  -stable, no sxs of overt CHF  -BNP 5332->4288  -TTE: EF 55-60%, mod LA dilation  -C/w lasiks 40 mg qD  - Continue to monitor  - Cardiology following     #Elevated troponin  - Type 2 MI  -Probably 2/2 to demand ischemia     #Acute Blood Loss Macrocytic anemia   -B12 mildly elevated, folate wnl   -Possibly 2/2 antineoplastics vs GIB  -s/p 1 U PRBC 2/17 after report of bloody stool, Hgb drop to 6.8  -GI recs: PPI BID x 8 weeks, no EGD now; possible EGD later as outpatient   -Hgb 10>8.3>8.8>>8.8 (12/25) No further GIB episodes since eliquis was restarted.  -Stop Eliquis if GI bleeding occurs again. If HgB less than 7, will transfuse      # Hyponatremia  -mild, received IVF, will monitor     #Cad, s/p CABG   - recent cath negative  - C/w beta blocker  - Cardiology following    #Chronic Afib   -AV pacing on monitor   -cont Digoxin, level 1.42 on 2/19. Continue to monitor  -cont  lopressor 25 mg BID   -Pt is not taking amiodarone due to toxicity  -[id number]: 5  - Cardiology following    #DVT prophylaxis   -Eliquis     #GOC  -DNR/DNI  -MOLST form signed and in chart     Case discussed with Dr. Bran

## 2022-02-26 LAB
CULTURE RESULTS: SIGNIFICANT CHANGE UP
CULTURE RESULTS: SIGNIFICANT CHANGE UP
SPECIMEN SOURCE: SIGNIFICANT CHANGE UP
SPECIMEN SOURCE: SIGNIFICANT CHANGE UP

## 2022-02-27 LAB
CULTURE RESULTS: SIGNIFICANT CHANGE UP
SPECIMEN SOURCE: SIGNIFICANT CHANGE UP

## 2022-03-02 DIAGNOSIS — I11.0 HYPERTENSIVE HEART DISEASE WITH HEART FAILURE: ICD-10-CM

## 2022-03-02 DIAGNOSIS — D64.81 ANEMIA DUE TO ANTINEOPLASTIC CHEMOTHERAPY: ICD-10-CM

## 2022-03-02 DIAGNOSIS — D84.9 IMMUNODEFICIENCY, UNSPECIFIED: ICD-10-CM

## 2022-03-02 DIAGNOSIS — C34.11 MALIGNANT NEOPLASM OF UPPER LOBE, RIGHT BRONCHUS OR LUNG: ICD-10-CM

## 2022-03-02 DIAGNOSIS — I21.A1 MYOCARDIAL INFARCTION TYPE 2: ICD-10-CM

## 2022-03-02 DIAGNOSIS — K92.1 MELENA: ICD-10-CM

## 2022-03-02 DIAGNOSIS — I48.20 CHRONIC ATRIAL FIBRILLATION, UNSPECIFIED: ICD-10-CM

## 2022-03-02 DIAGNOSIS — Z95.0 PRESENCE OF CARDIAC PACEMAKER: ICD-10-CM

## 2022-03-02 DIAGNOSIS — J96.01 ACUTE RESPIRATORY FAILURE WITH HYPOXIA: ICD-10-CM

## 2022-03-02 DIAGNOSIS — E87.1 HYPO-OSMOLALITY AND HYPONATREMIA: ICD-10-CM

## 2022-03-02 DIAGNOSIS — D69.6 THROMBOCYTOPENIA, UNSPECIFIED: ICD-10-CM

## 2022-03-02 DIAGNOSIS — D62 ACUTE POSTHEMORRHAGIC ANEMIA: ICD-10-CM

## 2022-03-02 DIAGNOSIS — Z79.899 OTHER LONG TERM (CURRENT) DRUG THERAPY: ICD-10-CM

## 2022-03-02 DIAGNOSIS — N17.9 ACUTE KIDNEY FAILURE, UNSPECIFIED: ICD-10-CM

## 2022-03-02 DIAGNOSIS — I25.10 ATHEROSCLEROTIC HEART DISEASE OF NATIVE CORONARY ARTERY WITHOUT ANGINA PECTORIS: ICD-10-CM

## 2022-03-02 DIAGNOSIS — E87.2 ACIDOSIS: ICD-10-CM

## 2022-03-02 DIAGNOSIS — D45 POLYCYTHEMIA VERA: ICD-10-CM

## 2022-03-02 DIAGNOSIS — Z20.822 CONTACT WITH AND (SUSPECTED) EXPOSURE TO COVID-19: ICD-10-CM

## 2022-03-02 DIAGNOSIS — I48.0 PAROXYSMAL ATRIAL FIBRILLATION: ICD-10-CM
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
## 2022-03-02 DIAGNOSIS — Z85.46 PERSONAL HISTORY OF MALIGNANT NEOPLASM OF PROSTATE: ICD-10-CM

## 2022-03-02 DIAGNOSIS — Z95.1 PRESENCE OF AORTOCORONARY BYPASS GRAFT: ICD-10-CM

## 2022-03-02 DIAGNOSIS — Z92.3 PERSONAL HISTORY OF IRRADIATION: ICD-10-CM

## 2022-03-02 DIAGNOSIS — I08.8 OTHER RHEUMATIC MULTIPLE VALVE DISEASES: ICD-10-CM

## 2022-03-02 DIAGNOSIS — I50.9 HEART FAILURE, UNSPECIFIED: ICD-10-CM

## 2022-03-02 DIAGNOSIS — R65.21 SEVERE SEPSIS WITH SEPTIC SHOCK: ICD-10-CM
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
## 2022-03-02 DIAGNOSIS — A41.9 SEPSIS, UNSPECIFIED ORGANISM: ICD-10-CM

## 2022-03-02 DIAGNOSIS — J18.9 PNEUMONIA, UNSPECIFIED ORGANISM: ICD-10-CM

## 2022-03-02 DIAGNOSIS — D51.9 VITAMIN B12 DEFICIENCY ANEMIA, UNSPECIFIED: ICD-10-CM

## 2022-03-02 DIAGNOSIS — D53.9 NUTRITIONAL ANEMIA, UNSPECIFIED: ICD-10-CM

## 2022-03-02 DIAGNOSIS — T45.1X5A ADVERSE EFFECT OF ANTINEOPLASTIC AND IMMUNOSUPPRESSIVE DRUGS, INITIAL ENCOUNTER: ICD-10-CM

## 2022-03-02 DIAGNOSIS — Z87.891 PERSONAL HISTORY OF NICOTINE DEPENDENCE: ICD-10-CM

## 2022-04-19 ENCOUNTER — APPOINTMENT (OUTPATIENT)
Dept: RHEUMATOLOGY | Facility: CLINIC | Age: 87
End: 2022-04-19

## 2022-06-02 NOTE — ED ADULT NURSE NOTE - CAS DISCH BELONGINGS RETURNED
Problem: PAIN - ADULT  Goal: Verbalizes/displays adequate comfort level or baseline comfort level  Description: Interventions:  - Encourage patient to monitor pain and request assistance  - Assess pain using appropriate pain scale  - Administer analgesics based on type and severity of pain and evaluate response  - Implement non-pharmacological measures as appropriate and evaluate response  - Consider cultural and social influences on pain and pain management  - Notify physician/advanced practitioner if interventions unsuccessful or patient reports new pain  Outcome: Progressing     Problem: GENITOURINARY - ADULT  Goal: Maintains or returns to baseline urinary function  Description: INTERVENTIONS:  - Assess urinary function  - Encourage oral fluids to ensure adequate hydration if ordered  - Administer IV fluids as ordered to ensure adequate hydration  - Administer ordered medications as needed  - Offer frequent toileting  - Follow urinary retention protocol if ordered  Outcome: Progressing  Goal: Absence of urinary retention  Description: INTERVENTIONS:  - Assess patients ability to void and empty bladder  - Monitor I/O  - Bladder scan as needed  - Discuss with physician/AP medications to alleviate retention as needed  - Discuss catheterization for long term situations as appropriate  Outcome: Progressing Not applicable

## 2023-05-09 ENCOUNTER — APPOINTMENT (OUTPATIENT)
Dept: UROLOGY | Facility: CLINIC | Age: 88
End: 2023-05-09

## 2023-05-17 ENCOUNTER — OUTPATIENT (OUTPATIENT)
Dept: OUTPATIENT SERVICES | Facility: HOSPITAL | Age: 88
LOS: 1 days | End: 2023-05-17
Payer: MEDICARE

## 2023-05-17 VITALS
HEART RATE: 71 BPM | DIASTOLIC BLOOD PRESSURE: 86 MMHG | OXYGEN SATURATION: 99 % | SYSTOLIC BLOOD PRESSURE: 125 MMHG | RESPIRATION RATE: 18 BRPM | WEIGHT: 194.01 LBS | HEIGHT: 73 IN | TEMPERATURE: 98 F

## 2023-05-17 DIAGNOSIS — N32.89 OTHER SPECIFIED DISORDERS OF BLADDER: ICD-10-CM

## 2023-05-17 DIAGNOSIS — Z95.0 PRESENCE OF CARDIAC PACEMAKER: Chronic | ICD-10-CM

## 2023-05-17 DIAGNOSIS — Z95.1 PRESENCE OF AORTOCORONARY BYPASS GRAFT: Chronic | ICD-10-CM

## 2023-05-17 DIAGNOSIS — Z90.2 ACQUIRED ABSENCE OF LUNG [PART OF]: Chronic | ICD-10-CM

## 2023-05-17 DIAGNOSIS — Z01.818 ENCOUNTER FOR OTHER PREPROCEDURAL EXAMINATION: ICD-10-CM

## 2023-05-17 DIAGNOSIS — Z98.61 CORONARY ANGIOPLASTY STATUS: Chronic | ICD-10-CM

## 2023-05-17 DIAGNOSIS — D49.4 NEOPLASM OF UNSPECIFIED BEHAVIOR OF BLADDER: ICD-10-CM

## 2023-05-17 DIAGNOSIS — I48.91 UNSPECIFIED ATRIAL FIBRILLATION: ICD-10-CM

## 2023-05-17 LAB
ANION GAP SERPL CALC-SCNC: 11 MMOL/L — SIGNIFICANT CHANGE UP (ref 5–17)
BUN SERPL-MCNC: 9 MG/DL — SIGNIFICANT CHANGE UP (ref 7–23)
CALCIUM SERPL-MCNC: 9 MG/DL — SIGNIFICANT CHANGE UP (ref 8.4–10.5)
CHLORIDE SERPL-SCNC: 100 MMOL/L — SIGNIFICANT CHANGE UP (ref 96–108)
CO2 SERPL-SCNC: 26 MMOL/L — SIGNIFICANT CHANGE UP (ref 22–31)
CREAT SERPL-MCNC: 0.63 MG/DL — SIGNIFICANT CHANGE UP (ref 0.5–1.3)
EGFR: 93 ML/MIN/1.73M2 — SIGNIFICANT CHANGE UP
GLUCOSE SERPL-MCNC: 143 MG/DL — HIGH (ref 70–99)
HCT VFR BLD CALC: 44.9 % — SIGNIFICANT CHANGE UP (ref 39–50)
HGB BLD-MCNC: 13.7 G/DL — SIGNIFICANT CHANGE UP (ref 13–17)
MCHC RBC-ENTMCNC: 30 PG — SIGNIFICANT CHANGE UP (ref 27–34)
MCHC RBC-ENTMCNC: 30.5 GM/DL — LOW (ref 32–36)
MCV RBC AUTO: 98.2 FL — SIGNIFICANT CHANGE UP (ref 80–100)
NRBC # BLD: 0 /100 WBCS — SIGNIFICANT CHANGE UP (ref 0–0)
PLATELET # BLD AUTO: 292 K/UL — SIGNIFICANT CHANGE UP (ref 150–400)
POTASSIUM SERPL-MCNC: 4 MMOL/L — SIGNIFICANT CHANGE UP (ref 3.5–5.3)
POTASSIUM SERPL-SCNC: 4 MMOL/L — SIGNIFICANT CHANGE UP (ref 3.5–5.3)
RBC # BLD: 4.57 M/UL — SIGNIFICANT CHANGE UP (ref 4.2–5.8)
RBC # FLD: 20 % — HIGH (ref 10.3–14.5)
SODIUM SERPL-SCNC: 137 MMOL/L — SIGNIFICANT CHANGE UP (ref 135–145)
WBC # BLD: 5.63 K/UL — SIGNIFICANT CHANGE UP (ref 3.8–10.5)
WBC # FLD AUTO: 5.63 K/UL — SIGNIFICANT CHANGE UP (ref 3.8–10.5)

## 2023-05-17 PROCEDURE — 87186 SC STD MICRODIL/AGAR DIL: CPT

## 2023-05-17 PROCEDURE — 80048 BASIC METABOLIC PNL TOTAL CA: CPT

## 2023-05-17 PROCEDURE — G0463: CPT

## 2023-05-17 PROCEDURE — 87086 URINE CULTURE/COLONY COUNT: CPT

## 2023-05-17 PROCEDURE — 87077 CULTURE AEROBIC IDENTIFY: CPT

## 2023-05-17 PROCEDURE — 85027 COMPLETE CBC AUTOMATED: CPT

## 2023-05-17 PROCEDURE — 36415 COLL VENOUS BLD VENIPUNCTURE: CPT

## 2023-05-17 NOTE — H&P PST ADULT - PROBLEM SELECTOR PLAN 2
Has been off eliquis since March due to hematuria Has been off eliquis since March due to hematuria as per cardiologist

## 2023-05-17 NOTE — H&P PST ADULT - NSICDXPASTSURGICALHX_GEN_ALL_CORE_FT
PAST SURGICAL HISTORY:  Artificial cardiac pacemaker 2006    History of PTCA with stent to RCA 2007    S/P CABG x 2      PAST SURGICAL HISTORY:  Artificial cardiac pacemaker 2006    History of PTCA with stent to RCA 2007    S/P CABG x 2     S/P partial lobectomy of lung

## 2023-05-17 NOTE — H&P PST ADULT - HISTORY OF PRESENT ILLNESS
This is an 89 y/o male PMH small cell lung CA diagnosed 2016, on lumakras, S/P left lobectomy, former 20 pack/yr smoker, atrial fibrillation, S/P PPM, CAD, S/P stent to the RCA 2007, S/P CABG X 2 2015, hypertension, c/o gross hematuria that started in December.  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    No H/O COVID infection This is an 87 y/o male PMH non small cell lung CA diagnosed 2016, S/P chemotherapy, now on lumakras, S/P right upper wedge resection 8/2016, former 20 pack/yr smoker, PAF, S/P PPM, CAD, S/P stent to the RCA 2007, S/P CABG X 2 2015, hypertension, c/o gross hematuria that started in December, was admitted for pneumonia, septic shock and pleural effusions 2/2022.  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    No H/O COVID infection

## 2023-05-31 RX ORDER — POTASSIUM CHLORIDE 20 MEQ
1 PACKET (EA) ORAL
Qty: 0 | Refills: 0 | DISCHARGE

## 2023-05-31 RX ORDER — APIXABAN 2.5 MG/1
1 TABLET, FILM COATED ORAL
Qty: 0 | Refills: 0 | DISCHARGE

## 2023-05-31 RX ORDER — METOPROLOL TARTRATE 50 MG
1 TABLET ORAL
Qty: 0 | Refills: 0 | DISCHARGE

## 2023-05-31 RX ORDER — FUROSEMIDE 40 MG
1 TABLET ORAL
Refills: 0 | DISCHARGE

## 2023-05-31 RX ORDER — METOPROLOL TARTRATE 50 MG
1 TABLET ORAL
Refills: 0 | DISCHARGE

## 2023-05-31 RX ORDER — ATORVASTATIN CALCIUM 80 MG/1
1 TABLET, FILM COATED ORAL
Qty: 0 | Refills: 0 | DISCHARGE

## 2023-05-31 RX ORDER — ATORVASTATIN CALCIUM 80 MG/1
1 TABLET, FILM COATED ORAL
Refills: 0 | DISCHARGE

## 2023-05-31 RX ORDER — POTASSIUM CHLORIDE 20 MEQ
0 PACKET (EA) ORAL
Refills: 0 | DISCHARGE

## 2023-05-31 RX ORDER — SOTORASIB 120 MG/1
8 TABLET, COATED ORAL
Refills: 0 | DISCHARGE

## 2023-05-31 RX ORDER — FERROUS SULFATE 325(65) MG
1 TABLET ORAL
Qty: 0 | Refills: 0 | DISCHARGE

## 2023-05-31 RX ORDER — FLUTICASONE FUROATE AND VILANTEROL TRIFENATATE 100; 25 UG/1; UG/1
1 POWDER RESPIRATORY (INHALATION)
Qty: 0 | Refills: 0 | DISCHARGE

## 2023-05-31 RX ORDER — FUROSEMIDE 40 MG
1 TABLET ORAL
Qty: 0 | Refills: 0 | DISCHARGE

## 2023-05-31 RX ORDER — FOLIC ACID 0.8 MG
1 TABLET ORAL
Qty: 0 | Refills: 0 | DISCHARGE

## 2023-05-31 RX ORDER — FOLIC ACID 0.8 MG
0 TABLET ORAL
Refills: 0 | DISCHARGE

## 2023-07-03 NOTE — DISCHARGE NOTE NURSING/CASE MANAGEMENT/SOCIAL WORK - NSDCPEELIQUIS_GEN_ALL_CORE
[FreeTextEntry1] : Patient is never seen a psychiatrist or been on any psychiatric medication.  She has been in therapy in the past. Apixaban/Eliquis - Compliance/Apixaban/Eliquis - Dietary Advice/Apixaban/Eliquis - Follow up monitoring/Apixaban/Eliquis - Potential for adverse drug reactions and interactions

## 2023-12-22 NOTE — ED PROVIDER NOTE - NSTIMEPROVIDERCAREINITIATE_GEN_ER
13-Feb-2022 21:16 Class I (easy) - visualization of the soft palate, fauces, uvula, and both anterior and posterior pillars

## 2024-07-18 NOTE — PROGRESS NOTE ADULT - PROBLEM SELECTOR PROBLEM 1
The patient's goals for the shift include      The clinical goals for the shift include remain comfortable    Over the shift, the patient did not make progress toward the following goals. Barriers to progression include   Problem: Skin  Goal: Decreased wound size/increased tissue granulation at next dressing change  Outcome: Progressing     Problem: Safety - Adult  Goal: Free from fall injury  Outcome: Progressing   . Recommendations to address these barriers include .    
Arteriosclerotic heart disease (ASHD)

## 2025-04-10 NOTE — PROGRESS NOTE ADULT - ASSESSMENT
The catheter was removed from the  proximal suprarenal abdominal aorta. 85 y/o male with a PMHx of polycythemia vera, prostate CA, rheumatic fever, CAD s/p CABG,  Afib s/p PPM , HTN, ? Prostate CA  presents to the ED c/o generalized weakness, and  SOB.     # Severe sepsis and acute respiratory failure 2/2 pneumonia in Lung Ca pt   -sepsis now resolved, hemodynamically stable off pressors  -ID recs appreciated, cont Cefepime (3)   -lactate- 3.6- rep lactate 2.5--> 1.4  -Bcx and Ucx negative     # Chronic CHF  -stable, no sxs of overt CHF  -BNP 5332  -TTE: EF 55-60%, mod LA dilation       #Elevated troponin  - Type 2 MI  -Probably 2/2 to demand ischemia     #Macrocytic anemia   -B12 mildly elevated, folate wnl     # Hyponatremia  -resolved     #Cad, s/p CABG   -cont bb, Lasix   - recent cath negative    #Chronic Afib   -RVR last night   -cont Digoxin   -restarted home BB dose of lopressor 25 mg BID   -Pt is not taking amiodarone due to toxicity  -on eliquis 5 mg q12    #DVT prophylaxis   -on eliquis     #Code status  Full code, MOLST signed    d/w Dr Naqvi